# Patient Record
Sex: MALE | Race: WHITE | Employment: OTHER | URBAN - METROPOLITAN AREA
[De-identification: names, ages, dates, MRNs, and addresses within clinical notes are randomized per-mention and may not be internally consistent; named-entity substitution may affect disease eponyms.]

---

## 2017-01-24 RX ORDER — SIMVASTATIN 20 MG/1
TABLET, FILM COATED ORAL
Qty: 90 TAB | Refills: 2 | Status: SHIPPED | OUTPATIENT
Start: 2017-01-24 | End: 2018-05-18 | Stop reason: SDUPTHER

## 2017-06-07 DIAGNOSIS — E78.5 DYSLIPIDEMIA: ICD-10-CM

## 2017-06-07 DIAGNOSIS — Z00.00 ROUTINE GENERAL MEDICAL EXAMINATION AT A HEALTH CARE FACILITY: ICD-10-CM

## 2017-06-07 DIAGNOSIS — Z00.00 NORMAL ROUTINE PHYSICAL EXAMINATION: ICD-10-CM

## 2017-06-07 RX ORDER — TADALAFIL 2.5 MG/1
2.5 TABLET ORAL AS NEEDED
Qty: 12 TAB | Refills: 3 | Status: SHIPPED | OUTPATIENT
Start: 2017-06-07 | End: 2018-04-23 | Stop reason: SDUPTHER

## 2017-06-07 NOTE — TELEPHONE ENCOUNTER
Requested Prescriptions     Pending Prescriptions Disp Refills    tadalafil (CIALIS) 2.5 mg tablet 12 Tab 3     Sig: Take 1 Tab by mouth as needed for Other.

## 2017-10-04 ENCOUNTER — TELEPHONE (OUTPATIENT)
Dept: INTERNAL MEDICINE CLINIC | Age: 72
End: 2017-10-04

## 2017-10-04 NOTE — TELEPHONE ENCOUNTER
#[phone] pt is going to [facility] and needs to know about inoculation. Please give pt a call to discuss.

## 2017-10-05 NOTE — TELEPHONE ENCOUNTER
Unable to reach patient LVM to return call.  Advised that Dr. Bailey Morgan had retired and he needed to establish care with a different PCP

## 2017-10-05 NOTE — TELEPHONE ENCOUNTER
Patient wanted to know if he'd received his Hep A shot because [place]. (i dont see anything on snapshot) If he has not received the shot how can he go about getting it. He stated Dr. Jhony Welch referred him to Dr. Charlotte Marvin.     Call back# [phone], ok to [facility]

## 2017-10-16 ENCOUNTER — TELEPHONE (OUTPATIENT)
Dept: INTERNAL MEDICINE CLINIC | Age: 72
End: 2017-10-16

## 2018-04-23 ENCOUNTER — OFFICE VISIT (OUTPATIENT)
Dept: INTERNAL MEDICINE CLINIC | Age: 73
End: 2018-04-23

## 2018-04-23 ENCOUNTER — HOSPITAL ENCOUNTER (OUTPATIENT)
Dept: LAB | Age: 73
Discharge: HOME | End: 2018-04-23
Payer: MEDICARE

## 2018-04-23 VITALS
HEART RATE: 70 BPM | RESPIRATION RATE: 16 BRPM | WEIGHT: 169 LBS | HEIGHT: 68 IN | SYSTOLIC BLOOD PRESSURE: 128 MMHG | OXYGEN SATURATION: 99 % | TEMPERATURE: 97.5 F | BODY MASS INDEX: 25.61 KG/M2 | DIASTOLIC BLOOD PRESSURE: 72 MMHG

## 2018-04-23 DIAGNOSIS — Z23 ENCOUNTER FOR IMMUNIZATION: ICD-10-CM

## 2018-04-23 DIAGNOSIS — E78.5 DYSLIPIDEMIA: ICD-10-CM

## 2018-04-23 DIAGNOSIS — H40.9 GLAUCOMA, UNSPECIFIED GLAUCOMA TYPE, UNSPECIFIED LATERALITY: ICD-10-CM

## 2018-04-23 DIAGNOSIS — R79.89 LOW VITAMIN D LEVEL: Chronic | ICD-10-CM

## 2018-04-23 DIAGNOSIS — M17.11 PRIMARY OSTEOARTHRITIS OF RIGHT KNEE: ICD-10-CM

## 2018-04-23 DIAGNOSIS — Z00.00 INITIAL MEDICARE ANNUAL WELLNESS VISIT: Primary | ICD-10-CM

## 2018-04-23 DIAGNOSIS — Z00.00 ROUTINE GENERAL MEDICAL EXAMINATION AT A HEALTH CARE FACILITY: ICD-10-CM

## 2018-04-23 DIAGNOSIS — N52.8 OTHER MALE ERECTILE DYSFUNCTION: Chronic | ICD-10-CM

## 2018-04-23 PROCEDURE — 82306 VITAMIN D 25 HYDROXY: CPT

## 2018-04-23 PROCEDURE — 84153 ASSAY OF PSA TOTAL: CPT

## 2018-04-23 PROCEDURE — 80053 COMPREHEN METABOLIC PANEL: CPT

## 2018-04-23 PROCEDURE — 80061 LIPID PANEL: CPT

## 2018-04-23 PROCEDURE — 86803 HEPATITIS C AB TEST: CPT

## 2018-04-23 PROCEDURE — 84443 ASSAY THYROID STIM HORMONE: CPT

## 2018-04-23 PROCEDURE — 85025 COMPLETE CBC W/AUTO DIFF WBC: CPT

## 2018-04-23 PROCEDURE — 36415 COLL VENOUS BLD VENIPUNCTURE: CPT

## 2018-04-23 RX ORDER — TADALAFIL 2.5 MG/1
2.5 TABLET ORAL AS NEEDED
Qty: 12 TAB | Refills: 3 | Status: SHIPPED | OUTPATIENT
Start: 2018-04-23 | End: 2019-09-06 | Stop reason: SDUPTHER

## 2018-04-23 NOTE — MR AVS SNAPSHOT
[address] [facility] 
[phone] Patient: Kael Partida MRN:  [id number] Visit Information Date & Time Provider Department Dept. Phone Encounter #  
 4/23/2018  9:30 AM Jared Fagan, [address] [id number] Follow-up Instructions Return in about 1 year (around 4/23/2019). Upcoming Health Maintenance Date Due Hepatitis C Screening 1945 GLAUCOMA SCREENING Q2Y 5/1/2015 Pneumococcal 65+ Low/Medium Risk (2 of 2 - PPSV23) 12/23/2015 Influenza Age 5 to Adult 8/1/2017 MEDICARE YEARLY EXAM 4/24/2019 DTaP/Tdap/Td series (2 - Td) 12/22/2021 COLONOSCOPY 8/19/2023 Allergies as of 4/23/2018  Review Complete On: 4/23/2018 By: Magalie Melgar III, DO Severity Noted Reaction Type Reactions Pravachol [Pravastatin]  11/23/2009    Rash  
 Sulfa (Sulfonamide Antibiotics)  11/23/2009    Other (comments) \"fever; bleeding\" Current Immunizations  Reviewed on 4/23/2018 Name Date Influenza Vaccine 10/7/2017, 10/4/2016, 9/15/2015, 11/24/2014 Influenza Vaccine Whole 9/1/2012 Pneumococcal Conjugate (PCV-13) 12/23/2014 TD Vaccine 12/7/2009 TDAP Vaccine 12/22/2011 10:15 AM  
 Zoster 12/22/2010 Reviewed by Jared Fagan DO on 4/23/2018 at  9:45 AM  
You Were Diagnosed With   
  
 Codes Comments Initial Medicare annual wellness visit    -  Primary ICD-10-CM: Z00.00 ICD-9-CM: V70.0 Routine general medical examination at a health care facility     ICD-10-CM: Z00.00 ICD-9-CM: V70.0 Dyslipidemia     ICD-10-CM: E78.5 ICD-9-CM: 272.4 Other male erectile dysfunction     ICD-10-CM: N52.8 ICD-9-CM: 607.84 Glaucoma, unspecified glaucoma type, unspecified laterality     ICD-10-CM: H40.9 ICD-9-CM: 365.9  Primary osteoarthritis of right knee     ICD-10-CM: M17.11 
 ICD-9-CM: 715.16 Low vitamin D level     ICD-10-CM: E55.9 ICD-9-CM: 268.9 Vitals BP Pulse Temp Resp Height(growth percentile) Weight(growth percentile) 147/70 (BP 1 Location: Right arm, BP Patient Position: Sitting) 70 97.5 °F (36.4 °C) (Oral) 16 5' 8\" (1.727 m) 169 lb (76.7 kg) SpO2 BMI Smoking Status 99% 25.7 kg/m2 Never Smoker Vitals History BMI and BSA Data Body Mass Index Body Surface Area 25.7 kg/m 2 1.92 m 2 Preferred Pharmacy Pharmacy Name Phone [facility][id number]- [place] [address] [phone] Your Updated Medication List  
  
   
This list is accurate as of 18 10:13 AM.  Always use your most recent med list.  
  
  
  
  
 ALEVE 220 mg tablet Generic drug:  naproxen sodium Take 220 mg by mouth two (2) times daily (with meals). aspirin delayed-release 81 mg tablet Take 81 mg by mouth daily. pneumococcal 23-valent 25 mcg/0.5 mL injection Commonly known as:  PNEUMOVAX 23  
0.5 mL by IntraMUSCular route once for 1 dose. simvastatin 20 mg tablet Commonly known as:  ZOCOR  
TAKE 1 TABLET BY MOUTH NIGHTLY  
  
 tadalafil 2.5 mg tablet Commonly known as:  CIALIS Take 1 Tab by mouth as needed for Other. varicella-zoster recombinant (PF) 50 mcg/0.5 mL Susr injection Commonly known as:  SHINGRIX (PF)  
0.5 mL by IntraMUSCular route once for 1 dose. Repeat 2nd dose in 2-6 months. VITAMIN D3 1,000 unit tablet Generic drug:  cholecalciferol Take  by mouth daily. XALATAN 0.005 % ophthalmic solution Generic drug:  latanoprost  
Administer 1 Drop to both eyes nightly. Dr. Maximilian Finnegan Prescriptions Printed Refills  
 varicella-zoster recombinant, PF, (SHINGRIX, PF,) 50 mcg/0.5 mL susr injection 1 Si.5 mL by IntraMUSCular route once for 1 dose. Repeat 2nd dose in 2-6 months. Class: Print Route: IntraMUSCular Prescriptions Sent to Pharmacy Refills  
 tadalafil (CIALIS) 2.5 mg tablet 3 Sig: Take 1 Tab by mouth as needed for Other. Class: Normal  
 Pharmacy: [address] [place] [address] Ph #: [phone] Route: Oral  
 pneumococcal 23-valent (PNEUMOVAX 23) 25 mcg/0.5 mL injection 0 Si.5 mL by IntraMUSCular route once for 1 dose. Class: Normal  
 Pharmacy: [address] [place] [address] Ph #: [phone] Route: IntraMUSCular We Performed the Following CBC WITH AUTOMATED DIFF [64083 CPT(R)] HEPATITIS C AB [89234 CPT(R)] LIPID PANEL [53567 CPT(R)] METABOLIC PANEL, COMPREHENSIVE [45681 CPT(R)] PSA, DIAGNOSTIC (PROSTATE SPECIFIC AG) D142074 CPT(R)] TSH 3RD GENERATION [08253 CPT(R)] VITAMIN D, 25 HYDROXY [id number] CPT(R)] Follow-up Instructions Return in about 1 year (around 2019). Patient Instructions Medicare Wellness Visit, Male The best way to live healthy is to have a healthy lifestyle by eating a well-balanced diet, exercising regularly, limiting alcohol and stopping smoking. Regular physical exams and screening tests are another way to keep healthy. Preventive exams provided by your health care provider can find health problems before they become diseases or illnesses. Preventive services including immunizations, screening tests, monitoring and exams can help you take care of your own health. All people over age 72 should have a pneumovax  and and a prevnar shot to prevent pneumonia. These are once in a lifetime unless you and your provider decide differently. All people over 65 should have a yearly flu shot and a tetanus vaccine every 10 years. Screening for diabetes mellitus with a blood sugar test should be done every year.  
 
Glaucoma is a disease of the eye due to increased ocular pressure that can lead to blindness and it should be done every year by an eye professional. 
 
Cardiovascular screening tests that check for elevated lipids (fatty part of blood) which can lead to heart disease and strokes should be done every 5 years. Colorectal screening that evaluates for blood or polyps in your colon should be done yearly as a stool test or every five years as a flexible sigmoidoscope or every 10 years as a colonoscopy up to age 76. Men up to age 76 may need a screening blood test for prostate cancer at certain intervals, depending on their personal and family history. This decision is between the patient and his provider. If you have been a smoker or had family history of abdominal aortic aneurysms, you and your provider may decide to schedule an ultrasound test of your aorta. Hepatitis C screening is also recommended for anyone born between [birth year] through [address]. A shingles vaccine is also recommended once in a lifetime after age 61. Your Medicare Wellness Exam is recommended annually. Here is a list of your current Health Maintenance items with a due date: 
Health Maintenance Due Topic Date Due  
 Hepatitis C Test  1945  Glaucoma Screening   05/01/2015  Pneumococcal Vaccine (2 of 2 - PPSV23) 12/23/2015  Flu Vaccine  08/01/2017 [facility] Annual Well Visit  03/14/2018 Introducing [facility]! [employer] introduces [employer] patient portal. Now you can access parts of your medical record, email your doctor's office, and request medication refills online. 1. In your internet browser, go to [email or web address] 2. Click on the First Time User? Click Here link in the Sign In box. You will see the New Member Sign Up page. 3. Enter your [employer] Access Code exactly as it appears below. You will not need to use this code after youve completed the sign-up process. If you do not sign up before the expiration date, you must request a new code. · "[employer] Access Code: [email or web address] Expires: 7/22/2018 10:13 AM 
 
4. Enter the last four digits of your Social Security Number (xxxx) and Date of Birth (mm/dd/yyyy) as indicated and click Submit. You will be taken to the next sign-up page. 5. Create a "[employer] ID. This will be your "[employer] login ID and cannot be changed, so think of one that is secure and easy to remember. 6. Create a "[employer] password. You can change your password at any time. 7. Enter your Password Reset Question and Answer. This can be used at a later time if you forget your password. 8. Enter your e-mail address. You will receive e-mail notification when new information is available in [address] 9. Click Sign Up. You can now view and download portions of your medical record. 10. Click the Download Summary menu link to download a portable copy of your medical information. If you have questions, please visit the Frequently Asked Questions section of the "[employer] website. Remember, "[employer] is NOT to be used for urgent needs. For medical emergencies, dial 911. Now available from your iPhone and Android! Please provide this summary of care documentation to your next provider. Your primary care clinician is listed as Robert Arellano If you have any questions after today's visit, please call [phone].

## 2018-04-23 NOTE — PATIENT INSTRUCTIONS

## 2018-04-23 NOTE — LETTER
4/24/2018 11:40 AM 
 
Mr. Horace Gorman [address] [id number] Dear Horace Gorman: 
 
Please find your most recent results below. Resulted Orders CBC WITH AUTOMATED DIFF Result Value Ref Range WBC 5.6 3.4 - 10.8 x10E3/uL  
 RBC 4.76 4.14 - 5.80 x10E6/uL HGB 14.0 13.0 - 17.7 g/dL HCT 41.7 37.5 - 51.0 % MCV 88 79 - 97 fL  
 MCH 29.4 26.6 - 33.0 pg  
 MCHC 33.6 31.5 - 35.7 g/dL  
 RDW 14.4 12.3 - 15.4 % PLATELET 917 139 - 171 x10E3/uL NEUTROPHILS 78 Not Estab. % Lymphocytes 16 Not Estab. % MONOCYTES 5 Not Estab. % EOSINOPHILS 1 Not Estab. % BASOPHILS 0 Not Estab. %  
 ABS. NEUTROPHILS 4.5 1.4 - 7.0 x10E3/uL Abs Lymphocytes 0.9 0.7 - 3.1 x10E3/uL  
 ABS. MONOCYTES 0.3 0.1 - 0.9 x10E3/uL  
 ABS. EOSINOPHILS 0.0 0.0 - 0.4 x10E3/uL  
 ABS. BASOPHILS 0.0 0.0 - 0.2 x10E3/uL IMMATURE GRANULOCYTES 0 Not Estab. %  
 ABS. IMM. GRANS. 0.0 0.0 - 0.1 x10E3/uL Narrative Performed at:  [address]  [id number] : Artie Arzate MD, Phone:  [phone] HEPATITIS C AB Result Value Ref Range Hep C Virus Ab <0.1 0.0 - 0.9 s/co ratio Comment:  
                                     Negative:     < 0.8 Indeterminate: 0.8 - 0.9 Positive:     > 0.9 The CDC recommends that a positive HCV antibody result 
 be followed up with a HCV Nucleic Acid Amplification 
 test ([id number]). Narrative Performed at:  [address]  [id number] : Artie Arzate MD, Phone:  [phone] METABOLIC PANEL, COMPREHENSIVE Result Value Ref Range Glucose 83 65 - 99 mg/dL BUN 19 8 - 27 mg/dL Creatinine 1.12 0.76 - 1.27 mg/dL GFR est non-AA 66 >59 mL/min/1.73 GFR est AA 77 >59 mL/min/1.73  
 BUN/Creatinine ratio 17 10 - 24  Sodium 145 (H) 134 - 144 mmol/L  
 Potassium 4.6 3.5 - 5.2 mmol/L Chloride 104 96 - 106 mmol/L  
 CO2 25 18 - 29 mmol/L Calcium 9.7 8.6 - 10.2 mg/dL Protein, total 6.9 6.0 - 8.5 g/dL Albumin 4.7 3.5 - 4.8 g/dL GLOBULIN, TOTAL 2.2 1.5 - 4.5 g/dL A-G Ratio 2.1 1.2 - 2.2 Bilirubin, total 0.5 0.0 - 1.2 mg/dL Alk. phosphatase 69 39 - 117 IU/L  
 AST (SGOT) 24 0 - 40 IU/L  
 ALT (SGPT) 14 0 - 44 IU/L Narrative Performed at:  [address]  [id number] : Milka Mathis MD, Phone:  [phone] LIPID PANEL Result Value Ref Range Cholesterol, total 252 (H) 100 - 199 mg/dL Triglyceride 73 0 - 149 mg/dL HDL Cholesterol 84 >39 mg/dL VLDL, calculated 15 5 - 40 mg/dL LDL, calculated 153 (H) 0 - 99 mg/dL Narrative Performed at:  [address]  [id number] : Milka Mathis MD, Phone:  [phone] PSA, DIAGNOSTIC (PROSTATE SPECIFIC AG) Result Value Ref Range Prostate Specific Ag 1.1 0.0 - 4.0 ng/mL Comment:  
   Roche ECLIA methodology. According to the American Urological Association, Serum PSA should 
decrease and remain at undetectable levels after radical 
prostatectomy. The AUA defines biochemical recurrence as an initial 
PSA value 0.2 ng/mL or greater followed by a subsequent confirmatory PSA value 0.2 ng/mL or greater. Values obtained with different assay methods or kits cannot be used 
interchangeably. Results cannot be interpreted as absolute evidence 
of the presence or absence of malignant disease. Narrative Performed at:  [address]  [id number] : Milka Mathis MD, Phone:  [phone] VITAMIN D, 25 HYDROXY Result Value Ref Range VITAMIN D, 25-HYDROXY 33.7 30.0 - 100.0 ng/mL    Comment:  
   Vitamin D deficiency has been defined by the [place] of 
 Medicine and an Endocrine Society practice guideline as a 
level of serum 25-OH vitamin D less than 20 ng/mL (1,2). The Endocrine Society went on to further define vitamin D 
insufficiency as a level between 21 and 29 ng/mL (2). 1. IOM ([place] of Medicine). 2010. Dietary reference 
   intakes for calcium and D. [address] [facility]: The 
   [employer]. 2. Fabiola MF, [place] NC, Sadiq LEA, et al. 
   Evaluation, treatment, and prevention of vitamin D 
   deficiency: an Endocrine Society clinical practice 
   guideline. JCEM. 2011 Jul; 96(7):1911-30. Narrative Performed at:  [address]  [id number] : Herman Cortez MD, Phone:  [phone] TSH 3RD GENERATION Result Value Ref Range TSH 1.200 0.450 - 4.500 uIU/mL Narrative Performed at:  [address]  [id number] : Herman Cortez MD, Phone:  [phone] RECOMMENDATIONS: 
Labs look good except cholesterol levels have shot up.  LDL now 153, goal is less than 100. Make sure you are taking his zocor every day.  If you have been, then we should double the dose to 40 mg daily.  Let me know if you need a new prescription for increased dose. Please call me if you have any questions: [phone] Sincerely, Teresita Ponce III, DO

## 2018-04-23 NOTE — PROGRESS NOTES
Luis Leonard is a 81 y.o. male who presents for evaluation of transfer of care. Used to see dr Ambrosio Reed as his pcp, last saw him dec 2016. Overall doing very well. Still works part time at law office. Stays active playing raHashdoc ball as well. ROS:  Constitutional: negative for fevers, chills, anorexia and weight loss  Eyes:   negative for visual disturbance and irritation  ENT:   negative for tinnitus,sore throat,nasal congestion,ear pain,hoarseness  Respiratory:  negative for cough, hemoptysis, dyspnea,wheezing  CV:   negative for chest pain, palpitations, lower extremity edema  GI:   negative for nausea, vomiting, diarrhea, abdominal pain,melena  Genitourinary: negative for frequency, dysuria and hematuria  Musculoskel: negative for myalgias, arthralgias, back pain, muscle weakness, joint pain  Neurological:  negative for headaches, dizziness, focal weakness, numbness  Psychiatric:     Negative for depression or anxiety      Past Medical History:   Diagnosis Date    Degenerative arthritis of knee 12/7/2009    Dermatitis,nonspecific 12/7/2009    Dyslipidemia 12/7/2009    Glaucoma 12/7/2009       Past Surgical History:   Procedure Laterality Date    HX SKIN BIOPSY  04/15    on forehead, pre-melanoma       Family History   Problem Relation Age of Onset    Cancer Mother     Heart Disease Father        Social History     Social History    Marital status:      Spouse name: N/A    Number of children: N/A    Years of education: N/A     Occupational History    Not on file.      Social History Main Topics    Smoking status: Never Smoker    Smokeless tobacco: Never Used    Alcohol use Yes      Comment: not to excess    Drug use: No    Sexual activity: Yes     Partners: Female     Other Topics Concern    Not on file     Social History Narrative            Visit Vitals    /70 (BP 1 Location: Right arm, BP Patient Position: Sitting)    Pulse 70    Temp 97.5 °F (36.4 °C) (Oral)  Resp 16    Ht 5' 8\" (1.727 m)    Wt 169 lb (76.7 kg)    SpO2 99%    BMI 25.7 kg/m2       Physical Examination:   General - Well appearing male  HEENT - PERRL, TM no erythema/opacification, normal nasal turbinates, no oropharyngeal erythema or exudate, MMM  Neck - supple, no bruits, no thyroidomegaly, no lymphadenopathy  Pulm - clear to auscultation bilaterally  Cardio - RRR, normal S1 S2, no murmur  Abd - soft, nontender, no masses, no HSM  Extrem - no edema, +2 distal pulses  Neuro-  No focal deficits, CN intact     Assessment/Plan:    1. Routine adult health maintenance---pneumovax 23 given today. rx given for shingrix. Check cbc, cmp, flp, tsh, hcv, psa  2. Low vit d--check levels, on replacement  3. ED--prn cialis  4.  hyperlipids--on zocor, check flp, cmp  5. Glaucoma--follows with dr Jonny Navas, on xalatan  6. DJD--right knee, right shoulder. Prn aleve  7. Possible psoriatic arthritis  8. Right forehead precancerous growth--follows with derm  9. Bilateral excessive ear wax--nurses to flush both today. rtc one year, sooner if labs abn. Ceasar Velasquez III, DO                This is an Initial Medicare Annual Wellness Exam (AWV) (Performed 12 months after IPPE or effective date of Medicare Part B enrollment, Once in a lifetime)    I have reviewed the patient's medical history in detail and updated the computerized patient record. History     Past Medical History:   Diagnosis Date    Degenerative arthritis of knee 12/7/2009    Dermatitis,nonspecific 12/7/2009    Dyslipidemia 12/7/2009    Glaucoma 12/7/2009      Past Surgical History:   Procedure Laterality Date    HX SKIN BIOPSY  04/15    on forehead, pre-melanoma     Current Outpatient Prescriptions   Medication Sig Dispense Refill    tadalafil (CIALIS) 2.5 mg tablet Take 1 Tab by mouth as needed for Other.  12 Tab 3    simvastatin (ZOCOR) 20 mg tablet TAKE 1 TABLET BY MOUTH NIGHTLY 90 Tab 2    cholecalciferol (VITAMIN D3) 1,000 unit tablet Take  by mouth daily.  aspirin delayed-release 81 mg tablet Take 81 mg by mouth daily.  latanoprost (XALATAN) 0.005 % ophthalmic solution Administer 1 Drop to both eyes nightly. Dr. Emigdio Linn       naproxen sodium (ALEVE) 220 mg tablet Take 220 mg by mouth two (2) times daily (with meals). Allergies   Allergen Reactions    Pravachol [Pravastatin] Rash    Sulfa (Sulfonamide Antibiotics) Other (comments)     \"fever; bleeding\"     Family History   Problem Relation Age of Onset    Cancer Mother     Heart Disease Father      Social History   Substance Use Topics    Smoking status: Never Smoker    Smokeless tobacco: Never Used    Alcohol use Yes      Comment: not to excess     Patient Active Problem List   Diagnosis Code    Dyslipidemia E78.5    Degenerative arthritis of knee M17.10    Glaucoma H40.9    Dermatitis,nonspecific L30.9    Conjunctivitis of left eye H10.9       Depression Risk Factor Screening:     PHQ over the last two weeks 4/23/2018   Little interest or pleasure in doing things Not at all   Feeling down, depressed or hopeless Not at all   Total Score PHQ 2 0     Alcohol Risk Factor Screening: You do not drink alcohol or very rarely. Few drinks couple times per week. Functional Ability and Level of Safety:     Hearing Loss  Hearing is good. Activities of Daily Living  The home contains: no safety equipment. Patient does total self care    Fall Risk  Fall Risk Assessment, last 12 mths 4/23/2018   Able to walk? Yes   Fall in past 12 months? No   Fall with injury? -   Number of falls in past 12 months -   Fall Risk Score -       Abuse Screen  Patient is not abused. Lives at home with wife of 39 years.     Cognitive Screening   Evaluation of Cognitive Function:  Has your family/caregiver stated any concerns about your memory: no  Normal    Patient Care Team   Patient Care Team:  Magalie Melgar III, DO as PCP - General (Internal Medicine)    Assessment/Plan   Education and counseling provided:  Are appropriate based on today's review and evaluation  End-of-Life planning (with patient's consent)  Pneumococcal Vaccine  Prostate cancer screening tests (PSA, covered annually)  Screening for glaucoma    Diagnoses and all orders for this visit:    1. Initial Medicare annual wellness visit    2. Routine general medical examination at a health care facility  -     tadalafil (CIALIS) 2.5 mg tablet; Take 1 Tab by mouth as needed for Other. 3. Dyslipidemia  -     tadalafil (CIALIS) 2.5 mg tablet; Take 1 Tab by mouth as needed for Other. 4. Normal routine physical examination  -     tadalafil (CIALIS) 2.5 mg tablet; Take 1 Tab by mouth as needed for Other.          Health Maintenance Due   Topic Date Due    Hepatitis C Screening  1945    GLAUCOMA SCREENING Q2Y  05/01/2015    Pneumococcal 65+ Low/Medium Risk (2 of 2 - PPSV23) 12/23/2015    Influenza Age 9 to Adult  08/01/2017    MEDICARE YEARLY EXAM  03/14/2018

## 2018-04-23 NOTE — PROGRESS NOTES
Reviewed record in preparation for visit and have obtained necessary documentation. Identified pt with two pt identifiers(name and ). Chief Complaint   Patient presents with   [facility] Maintenance Due   Topic Date Due    Hepatitis C Screening  1945    GLAUCOMA SCREENING Q2Y  2015    Pneumococcal 65+ Low/Medium Risk (2 of 2 - PPSV23) 2015    Influenza Age 5 to Adult  2017    MEDICARE YEARLY EXAM  2018       Mr. Johan Campos has a reminder for a \"due or due soon\" health maintenance. I have asked that he discuss health maintenance topic(s) due with His  primary care provider. Coordination of Care Questionnaire:  :     1) Have you been to an emergency room, urgent care clinic since your last visit? no   Hospitalized since your last visit? no             2) Have you seen or consulted any other health care providers outside of [address] [place] [address] since your last visit? no  (Include any pap smears or colon screenings in this section.)    3) Do you have an Advance Directive on file? no    4) Are you interested in receiving information on Advance Directives? NO    Patient is accompanied by self I have received verbal consent from Boubacar Bai to discuss any/all medical information while they are present in the room.

## 2018-04-24 LAB
25(OH)D3+25(OH)D2 SERPL-MCNC: 33.7 NG/ML (ref 30–100)
ALBUMIN SERPL-MCNC: 4.7 G/DL (ref 3.5–4.8)
ALBUMIN/GLOB SERPL: 2.1 {RATIO} (ref 1.2–2.2)
ALP SERPL-CCNC: 69 IU/L (ref 39–117)
ALT SERPL-CCNC: 14 IU/L (ref 0–44)
AST SERPL-CCNC: 24 IU/L (ref 0–40)
BASOPHILS # BLD AUTO: 0 X10E3/UL (ref 0–0.2)
BASOPHILS NFR BLD AUTO: 0 %
BILIRUB SERPL-MCNC: 0.5 MG/DL (ref 0–1.2)
BUN SERPL-MCNC: 19 MG/DL (ref 8–27)
BUN/CREAT SERPL: 17 (ref 10–24)
CALCIUM SERPL-MCNC: 9.7 MG/DL (ref 8.6–10.2)
CHLORIDE SERPL-SCNC: 104 MMOL/L (ref 96–106)
CHOLEST SERPL-MCNC: 252 MG/DL (ref 100–199)
CO2 SERPL-SCNC: 25 MMOL/L (ref 18–29)
CREAT SERPL-MCNC: 1.12 MG/DL (ref 0.76–1.27)
EOSINOPHIL # BLD AUTO: 0 X10E3/UL (ref 0–0.4)
EOSINOPHIL NFR BLD AUTO: 1 %
ERYTHROCYTE [DISTWIDTH] IN BLOOD BY AUTOMATED COUNT: 14.4 % (ref 12.3–15.4)
GFR SERPLBLD CREATININE-BSD FMLA CKD-EPI: 66 ML/MIN/1.73
GFR SERPLBLD CREATININE-BSD FMLA CKD-EPI: 77 ML/MIN/1.73
GLOBULIN SER CALC-MCNC: 2.2 G/DL (ref 1.5–4.5)
GLUCOSE SERPL-MCNC: 83 MG/DL (ref 65–99)
HCT VFR BLD AUTO: 41.7 % (ref 37.5–51)
HCV AB S/CO SERPL IA: <0.1 S/CO RATIO (ref 0–0.9)
HDLC SERPL-MCNC: 84 MG/DL
HGB BLD-MCNC: 14 G/DL (ref 13–17.7)
IMM GRANULOCYTES # BLD: 0 X10E3/UL (ref 0–0.1)
IMM GRANULOCYTES NFR BLD: 0 %
LDLC SERPL CALC-MCNC: 153 MG/DL (ref 0–99)
LYMPHOCYTES # BLD AUTO: 0.9 X10E3/UL (ref 0.7–3.1)
LYMPHOCYTES NFR BLD AUTO: 16 %
MCH RBC QN AUTO: 29.4 PG (ref 26.6–33)
MCHC RBC AUTO-ENTMCNC: 33.6 G/DL (ref 31.5–35.7)
MCV RBC AUTO: 88 FL (ref 79–97)
MONOCYTES # BLD AUTO: 0.3 X10E3/UL (ref 0.1–0.9)
MONOCYTES NFR BLD AUTO: 5 %
NEUTROPHILS # BLD AUTO: 4.5 X10E3/UL (ref 1.4–7)
NEUTROPHILS NFR BLD AUTO: 78 %
PLATELET # BLD AUTO: 225 X10E3/UL (ref 150–379)
POTASSIUM SERPL-SCNC: 4.6 MMOL/L (ref 3.5–5.2)
PROT SERPL-MCNC: 6.9 G/DL (ref 6–8.5)
PSA SERPL-MCNC: 1.1 NG/ML (ref 0–4)
RBC # BLD AUTO: 4.76 X10E6/UL (ref 4.14–5.8)
SODIUM SERPL-SCNC: 145 MMOL/L (ref 134–144)
TRIGL SERPL-MCNC: 73 MG/DL (ref 0–149)
TSH SERPL DL<=0.005 MIU/L-ACNC: 1.2 UIU/ML (ref 0.45–4.5)
VLDLC SERPL CALC-MCNC: 15 MG/DL (ref 5–40)
WBC # BLD AUTO: 5.6 X10E3/UL (ref 3.4–10.8)

## 2018-04-24 NOTE — PROGRESS NOTES
I have attempted to contact this patient by phone with the following results:   Labs look good except cholesterol levels have shot up. LDL now 153, goal is less than 100. Make sure he is taking his zocor every day. If he has been, then we should double the dose to 40 mg daily. Let me know if he needs new rx for increased dose. Detailed message left on voicemail regarding above. Results mailed to patient's home.

## 2018-04-24 NOTE — PROGRESS NOTES
Labs look good except cholesterol levels have shot up. LDL now 153, goal is less than 100. Make sure he is taking his zocor every day. If he has been, then we should double the dose to 40 mg daily. Let me know if he needs new rx for increased dose.

## 2018-07-30 ENCOUNTER — APPOINTMENT (OUTPATIENT)
Dept: CT IMAGING | Age: 73
DRG: 066 | End: 2018-07-30
Attending: EMERGENCY MEDICINE
Payer: MEDICARE

## 2018-07-30 ENCOUNTER — HOSPITAL ENCOUNTER (INPATIENT)
Age: 73
LOS: 2 days | Discharge: HOME | DRG: 066 | End: 2018-08-01
Attending: EMERGENCY MEDICINE | Admitting: FAMILY MEDICINE
Payer: MEDICARE

## 2018-07-30 DIAGNOSIS — R27.0 ATAXIA: ICD-10-CM

## 2018-07-30 DIAGNOSIS — I63.9 CEREBROVASCULAR ACCIDENT (CVA), UNSPECIFIED MECHANISM (HCC): ICD-10-CM

## 2018-07-30 DIAGNOSIS — R47.1 DYSARTHRIA: ICD-10-CM
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
## 2018-07-30 PROBLEM — R41.82 ALTERED MENTAL STATUS: Status: ACTIVE | Noted: 2018-07-30

## 2018-07-30 LAB
ALBUMIN SERPL-MCNC: 3.7 G/DL (ref 3.5–5)
ALBUMIN/GLOB SERPL: 1 {RATIO} (ref 1.1–2.2)
ALP SERPL-CCNC: 63 U/L (ref 45–117)
ALT SERPL-CCNC: 28 U/L (ref 12–78)
ANION GAP SERPL CALC-SCNC: 6 MMOL/L (ref 5–15)
AST SERPL-CCNC: 21 U/L (ref 15–37)
BASOPHILS # BLD: 0 K/UL (ref 0–0.1)
BASOPHILS NFR BLD: 0 % (ref 0–1)
BILIRUB SERPL-MCNC: 0.5 MG/DL (ref 0.2–1)
BUN SERPL-MCNC: 21 MG/DL (ref 6–20)
BUN/CREAT SERPL: 19 (ref 12–20)
CALCIUM SERPL-MCNC: 9.2 MG/DL (ref 8.5–10.1)
CHLORIDE SERPL-SCNC: 106 MMOL/L (ref 97–108)
CO2 SERPL-SCNC: 30 MMOL/L (ref 21–32)
CREAT SERPL-MCNC: 1.09 MG/DL (ref 0.7–1.3)
DIFFERENTIAL METHOD BLD: NORMAL
EOSINOPHIL # BLD: 0.1 K/UL (ref 0–0.4)
EOSINOPHIL NFR BLD: 1 % (ref 0–7)
ERYTHROCYTE [DISTWIDTH] IN BLOOD BY AUTOMATED COUNT: 13.8 % (ref 11.5–14.5)
GLOBULIN SER CALC-MCNC: 3.8 G/DL (ref 2–4)
GLUCOSE SERPL-MCNC: 91 MG/DL (ref 65–100)
HCT VFR BLD AUTO: 43.1 % (ref 36.6–50.3)
HGB BLD-MCNC: 13.9 G/DL (ref 12.1–17)
IMM GRANULOCYTES # BLD: 0 K/UL (ref 0–0.04)
IMM GRANULOCYTES NFR BLD AUTO: 0 % (ref 0–0.5)
LYMPHOCYTES # BLD: 1.3 K/UL (ref 0.8–3.5)
LYMPHOCYTES NFR BLD: 24 % (ref 12–49)
MCH RBC QN AUTO: 30.2 PG (ref 26–34)
MCHC RBC AUTO-ENTMCNC: 32.3 G/DL (ref 30–36.5)
MCV RBC AUTO: 93.7 FL (ref 80–99)
MONOCYTES # BLD: 0.4 K/UL (ref 0–1)
MONOCYTES NFR BLD: 7 % (ref 5–13)
NEUTS SEG # BLD: 3.9 K/UL (ref 1.8–8)
NEUTS SEG NFR BLD: 68 % (ref 32–75)
NRBC # BLD: 0 K/UL (ref 0–0.01)
NRBC BLD-RTO: 0 PER 100 WBC
PLATELET # BLD AUTO: 209 K/UL (ref 150–400)
PMV BLD AUTO: 10.5 FL (ref 8.9–12.9)
POTASSIUM SERPL-SCNC: 3.6 MMOL/L (ref 3.5–5.1)
PROT SERPL-MCNC: 7.5 G/DL (ref 6.4–8.2)
RBC # BLD AUTO: 4.6 M/UL (ref 4.1–5.7)
SODIUM SERPL-SCNC: 142 MMOL/L (ref 136–145)
WBC # BLD AUTO: 5.7 K/UL (ref 4.1–11.1)

## 2018-07-30 PROCEDURE — 70450 CT HEAD/BRAIN W/O DYE: CPT

## 2018-07-30 PROCEDURE — 93005 ELECTROCARDIOGRAM TRACING: CPT

## 2018-07-30 PROCEDURE — 85025 COMPLETE CBC W/AUTO DIFF WBC: CPT | Performed by: EMERGENCY MEDICINE

## 2018-07-30 PROCEDURE — 80053 COMPREHEN METABOLIC PANEL: CPT | Performed by: EMERGENCY MEDICINE

## 2018-07-30 PROCEDURE — 65660000000 HC RM CCU STEPDOWN

## 2018-07-30 PROCEDURE — 99285 EMERGENCY DEPT VISIT HI MDM: CPT

## 2018-07-30 PROCEDURE — 36415 COLL VENOUS BLD VENIPUNCTURE: CPT | Performed by: EMERGENCY MEDICINE

## 2018-07-30 NOTE — IP AVS SNAPSHOT
1111 [facility] [address] [place] [address] 
[phone] Patient: Gino Lazcano MRN: [id number] NWR:46/28/2049 About your hospitalization You were admitted on:  July 30, 2018 You last received care in the:  [facility] 6S NEURO-SCI TELE You were discharged on:  August 1, 2018 Why you were hospitalized Your primary diagnosis was:  Ataxia Your diagnoses also included:  Dysarthria, Fall, Stroke (Hcc) Follow-up Information Follow up With Details Comments Contact Info Juan David Rogers III, DO Schedule an appointment as soon as possible for a visit  [address] [facility] [address] [facility] 83. 
[phone] Jackquelyn Meckel, DO Schedule an appointment as soon as possible for a visit in 4 weeks  200 [facility] 56 [facility] Neurology [facility] at [address] 
[phone] Discharge Orders None A check esperanza indicates which time of day the medication should be taken. My Medications START taking these medications Instructions Each Dose to Equal  
 Morning Noon Evening Bedtime  
 clopidogrel 75 mg Tab Commonly known as:  PLAVIX Start taking on:  8/2/2018 Your last dose was: Your next dose is: Take 1 Tab by mouth daily. 75 mg CONTINUE taking these medications Instructions Each Dose to Equal  
 Morning Noon Evening Bedtime  
 aspirin delayed-release 81 mg tablet Your last dose was: Your next dose is: Take 81 mg by mouth daily. 81 mg  
    
   
   
   
  
 simvastatin 40 mg tablet Commonly known as:  ZOCOR Your last dose was: Your next dose is: TAKE 1 TABLET BY MOUTH NIGHTLY  
     
   
   
   
  
 tadalafil 2.5 mg tablet Commonly known as:  CIALIS Your last dose was: Your next dose is: Take 1 Tab by mouth as needed for Other. 2.5 mg  
    
   
   
   
  
 VITAMIN D3 1,000 unit tablet Generic drug:  cholecalciferol Your last dose was: Your next dose is: Take  by mouth daily. XALATAN 0.005 % ophthalmic solution Generic drug:  latanoprost  
   
Your last dose was: Your next dose is:    
   
   
 Administer 1 Drop to both eyes nightly. Dr. Greyson Gonzalez 1 Drop STOP taking these medications ALEVE 220 mg tablet Generic drug:  naproxen sodium Where to Get Your Medications Information on where to get these meds will be given to you by the nurse or doctor. ! Ask your nurse or doctor about these medications  
  clopidogrel 75 mg Tab Discharge Instructions Discharge Instructions PATIENT ID: Donta Olson MRN: [id number] YOB: 1945 DATE OF ADMISSION: 7/30/2018  9:24 PM   
DATE OF DISCHARGE: 8/1/2018 PRIMARY CARE PROVIDER: Divine Reno DO  
 
ATTENDING PHYSICIAN: Surinder Ayers MD 
DISCHARGING PROVIDER: Surinder Ayers MD   
To contact this individual call [phone] and ask the  to page. If unavailable ask to be transferred the Adult Hospitalist Department. DISCHARGE DIAGNOSES stroke CONSULTATIONS: IP CONSULT TO HOSPITALIST 
IP CONSULT TO TELE-NEUROLOGY 
IP CONSULT TO NEUROLOGY PROCEDURES/SURGERIES: * No surgery found * PENDING TEST RESULTS:  
At the time of discharge the following test results are still pending: None FOLLOW UP APPOINTMENTS:  
Follow-up Information Follow up With Details Comments Contact Info Citlaly Islas III, DO Schedule an appointment as soon as possible for a visit  Vicky Wilcox 150 [facility] [address] [facility] 83. 
[phone] Maria E Snyder DO Schedule an appointment as soon as possible for a visit in 4 weeks  200 [facility] [address] 56 [facility] Neurology Clinic at [facility] [place] 
[phone] ADDITIONAL CARE RECOMMENDATIONS:  
 
You came in with some slurred speech and were found to have a stroke. You have improved and are almost back to your baseline. We have started you on Plavix and you should continue to take this medication. Please follow-up with Neurology in 4 weeks. DIET: Cardiac Diet ACTIVITY: Activity as tolerated DISCHARGE MEDICATIONS: 
 See Medication Reconciliation Form · It is important that you take the medication exactly as they are prescribed. · Keep your medication in the bottles provided by the pharmacist and keep a list of the medication names, dosages, and times to be taken in your wallet. · Do not take other medications without consulting your doctor. NOTIFY YOUR PHYSICIAN FOR ANY OF THE FOLLOWING:  
Fever over 101 degrees for 24 hours. Chest pain, shortness of breath, fever, chills, nausea, vomiting, diarrhea, change in mentation, falling, weakness, bleeding. Severe pain or pain not relieved by medications. Or, any other signs or symptoms that you may have questions about. DISPOSITION: 
  Home With: 
 OT  PT  [place]  RN  
  
 SNF/Inpatient Rehab/LTAC Independent/assisted living Hospice Other:  
 
Signed:  
Odessa Cárdenas MD 
8/1/2018 2:45 PM 
Stroke: Care Instructions Your Care Instructions You have had a stroke. This means that the blood flow to a part of your brain was blocked for some time, which damages the nerve cells in that part of the brain. The part of your body controlled by that part of your brain may not function properly now. The brain is an amazing organ that can heal itself to some degree. The stroke you had damaged part of your brain. But other parts of your brain may take over in some way for the damaged areas. You have already started this process.  
Your doctor will talk with you about what you can do to prevent another stroke. High blood pressure, high cholesterol, and diabetes are all risk factors for stroke. If you have any of these conditions, work with your doctor to make sure they are under control. Other risk factors for stroke include being overweight, smoking, and not getting regular exercise. Going home may be hard for you and your family. The more you can try to do for yourself, the better. Remember to take each day one at a time. Follow-up care is a key part of your treatment and safety. Be sure to make and go to all appointments, and call your doctor if you are having problems. It's also a good idea to know your test results and keep a list of the medicines you take. How can you care for yourself at home? 
  · Enter a stroke rehabilitation (rehab) program, if your doctor recommends it. Physical, speech, and occupational therapies can help you manage bathing, dressing, eating, and other basics of daily living.  
  · Do not drive until your doctor says it is okay.  
  · It is normal to feel sad or depressed after a stroke. If these feelings last, talk to your doctor.  
  · If you are having problems with urine leakage, go to the bathroom at regular times, including when you first wake up and at bedtime. Also, limit fluids after dinner.  
  · If you are constipated, drink plenty of fluids, enough so that your urine is light yellow or clear like water. If you have kidney, heart, or liver disease and have to limit fluids, talk with your doctor before you increase the amount of fluids you drink. Set up a regular time for using the toilet. If you continue to have constipation, your doctor may suggest using a bulking agent, such as Metamucil, or a stool softener, laxative, or enema. Medicines 
  · Take your medicines exactly as prescribed. Call your doctor if you think you are having a problem with your medicine. You may be taking several medicines.  ACE (angiotensin-converting enzyme) inhibitors, angiotensin II receptor blockers (ARBs), beta-blockers, diuretics (water pills), and calcium channel blockers control your blood pressure. Statins help lower cholesterol. Your doctor may also prescribe medicines for depression, pain, sleep problems, anxiety, or agitation.  
  · If your doctor has given you a blood thinner to prevent another stroke, be sure you get instructions about how to take your medicine safely. Blood thinners can cause serious bleeding problems.  
  · Do not take any over-the-counter medicines or herbal products without talking to your doctor first.  
  · If you take birth control pills or hormone therapy, talk to your doctor about whether they are right for you.  
 For family members and caregivers 
  · Make the home safe. Set up a room so that your loved one does not have to climb stairs. Be sure the bathroom is on the same floor. Move throw rugs and furniture that could cause falls. Make sure that the lighting is good. Put grab bars and seats in tubs and showers.  
  · Find out what your loved one can do and what he or she needs help with. Try not to do things for your loved one that your loved one can do on his or her own. Help him or her learn and practice new skills.  
  · Visit and talk with your loved one often. Try doing activities together that you both enjoy, such as playing cards or board games. Keep in touch with your loved one's friends as much as you can. Encourage them to visit.  
  · Take care of yourself. Do not try to do everything yourself. Ask other family members to help. Eat well, get enough rest, and take time to do things that you enjoy. Keep up with your own doctor visits, and make sure to take your medicines regularly. Get out of the house as much as you can. Join a local support group. Find out if you qualify for home health care visits to help with rehab or for adult day care. When should you call for help? Call 911 anytime you think you may need emergency care. For example, call if: 
  · You have signs of another stroke. These may include: 
¨ Sudden numbness, tingling, weakness, or loss of movement in your face, arm, or leg, especially on only one side of your body. ¨ Sudden vision changes. ¨ Sudden trouble speaking. ¨ Sudden confusion or trouble understanding simple statements. ¨ Sudden problems with walking or balance. ¨ A sudden, severe headache that is different from past headaches. Call 911 even if these symptoms go away in a few minutes.  
 Call your doctor now or seek immediate medical care if: 
  · You have new symptoms that may be related to your stroke, such as falls or trouble swallowing.  
 Watch closely for changes in your health, and be sure to contact your doctor if you have any problems. Where can you learn more? Go to http://marie-jose.info/. Enter T433 in the search box to learn more about \"Stroke: Care Instructions. \" Current as of: November 21, 2017 Content Version: 11.7 © 7744-7950 [employer]. Care instructions adapted under license by [employer] (which disclaims liability or warranty for this information). If you have questions about a medical condition or this instruction, always ask your healthcare professional. [place] [address] any warranty or liability for your use of this information. ACO Transitions of Care Introducing [employer] 508 Denita Sierra offers a voluntary care coordination program to provide high quality service and care to [facility] fee-for-service beneficiaries. Jhony Ronald was designed to help you enhance your health and well-being through the following services: ? Transitions of Care  support for individuals who are transitioning from one care setting to another (example: Hospital to home). ? Chronic and Complex Care Coordination  support for individuals and caregivers of those with serious or chronic illnesses or with more than one chronic (ongoing) condition and those who take a number of different medications. If you meet specific medical criteria, a [facility] may call you directly to coordinate your care with your primary care physician and your other care providers. For questions about the [facility] programs, please, contact your physicians office. For general questions or additional information about Accountable Care Organizations: 
Please visit www.medicare.gov/acos. html or call 1-800-MEDICARE (6-629.374.7693) TTY users should call 3-425.218.2308. [employer] Announcement We are excited to announce that we are making your provider's discharge notes available to you in [employer]. You will see these notes when they are completed and signed by the physician that discharged you from your recent hospital stay. If you have any questions or concerns about any information you see in [employer], please call the Health Information Department where you were seen or reach out to your Primary Care Provider for more information about your plan of care. Introducing \[facility]\"" & HEALTH SERVICES! Mary Beth Alexis introduces [employer] patient portal. Now you can access parts of your medical record, email your doctor's office, and request medication refills online. 1. In your internet browser, go to [email or web address] 2. Click on the First Time User? Click Here link in the Sign In box. You will see the New Member Sign Up page. 3. Enter your [employer] Access Code exactly as it appears below. You will not need to use this code after youve completed the sign-up process. If you do not sign up before the expiration date, you must request a new code. · [employer] Access Code: [email or web address] Expires: 10/28/2018  8:27 PM 
 
 4. Enter the last four digits of your Social Security Number (xxxx) and Date of Birth (mm/dd/yyyy) as indicated and click Submit. You will be taken to the next sign-up page. 5. Create a i-Nalysis ID. This will be your i-Nalysis login ID and cannot be changed, so think of one that is secure and easy to remember. 6. Create a i-Nalysis password. You can change your password at any time. 7. Enter your Password Reset Question and Answer. This can be used at a later time if you forget your password. 8. Enter your e-mail address. You will receive e-mail notification when new information is available in [address] 9. Click Sign Up. You can now view and download portions of your medical record. 10. Click the Download Summary menu link to download a portable copy of your medical information. If you have questions, please visit the Frequently Asked Questions section of the i-Nalysis website. Remember, [employer]-Nalysis is NOT to be used for urgent needs. For medical emergencies, dial 911. Now available from your iPhone and Android! Introducing Theron Dempsey As a [facility] patient, I wanted to make you aware of our electronic visit tool called Theron [employer]. We[employer] 24/7 allows you to connect within minutes with a medical provider 24 hours a day, seven days a week via a mobile device or tablet or logging into a secure website from your computer. You can access Theron "[employer] from anywhere in the [place]. A virtual visit might be right for you when you have a simple condition and feel like you just dont want to get out of bed, or cant get away from work for an appointment, when your regular [facility] provider is not available (evenings, weekends or holidays), or when youre out of town and need minor care.   Electronic visits cost only $49 and if the Theron Cabellosusan provider determines a prescription is needed to treat your condition, one can be electronically transmitted to a nearby pharmacy*. Please take a moment to enroll today if you have not already done so. The enrollment process is free and takes just a few minutes. To enroll, please download the [employer] 24/7 eric to your tablet or phone, or visit [email or web address] to enroll on your computer. And, as an [address] [place] [address] patient with a [employer] account, the results of your visits will be scanned into your electronic medical record and your primary care provider will be able to view the scanned results. We urge you to continue to see your regular [employer] provider for your ongoing medical care. And while your primary care provider may not be the one available when you seek a [employer] virtual visit, the peace of mind you get from getting a real diagnosis real time can be priceless. For more information on [employer], view our Frequently Asked Questions (FAQs) at [email or web address]. Sincerely, 
 
Ziyad Uriostegui MD 
Chief Medical Officer [address] [place] [address] *:  certain medications cannot be prescribed via [employer] Providers Seen During Your Hospitalization Provider Specialty Primary office phone Maico Lee MD Emergency Medicine [phone] Kannan Braga MD Family Practice [phone] Shelley Stone MD Hospitalist [phone] Your Primary Care Physician (PCP) Primary Care Physician Office Phone Office Fax Gita GERONIMO [phone] You are allergic to the following Allergen Reactions Pravachol (Pravastatin) Rash  
    
 Sulfa (Sulfonamide Antibiotics) Other (comments) \"fever; bleeding\" Recent Documentation Height Weight BMI Smoking Status 1.753 m 77.1 kg 25.1 kg/m2 Never Smoker Emergency Contacts Name Discharge Info Relation Home Work Mobile [address] [facility] CAREGIVER [3] Spouse [3] 21 [phone] Patient Belongings The following personal items are in your possession at time of discharge: 
  Dental Appliances: None  Visual Aid: Glasses, With patient      Home Medications: None   Jewelry: Watch, With patient  Clothing: Belt, Pants, Socks, Footwear    Other Valuables: Cell Phone  Personal Items Sent to Safe: none Please provide this summary of care documentation to your next provider. Signatures-by signing, you are acknowledging that this After Visit Summary has been reviewed with you and you have received a copy. Patient Signature:  ____________________________________________________________ Date:  ____________________________________________________________  
  
[facility] Provider Signature:  ____________________________________________________________ Date:  ____________________________________________________________

## 2018-07-30 NOTE — IP AVS SNAPSHOT
[place] [address] [facility] 13 
[phone] Patient: Simon Radford MRN: [id number] QGR:16/27/1324 A check esperanza indicates which time of day the medication should be taken. My Medications START taking these medications Instructions Each Dose to Equal  
 Morning Noon Evening Bedtime  
 clopidogrel 75 mg Tab Commonly known as:  PLAVIX Start taking on:  8/2/2018 Your last dose was: Your next dose is: Take 1 Tab by mouth daily. 75 mg CONTINUE taking these medications Instructions Each Dose to Equal  
 Morning Noon Evening Bedtime  
 aspirin delayed-release 81 mg tablet Your last dose was: Your next dose is: Take 81 mg by mouth daily. 81 mg  
    
   
   
   
  
 simvastatin 40 mg tablet Commonly known as:  ZOCOR Your last dose was: Your next dose is: TAKE 1 TABLET BY MOUTH NIGHTLY  
     
   
   
   
  
 tadalafil 2.5 mg tablet Commonly known as:  CIALIS Your last dose was: Your next dose is: Take 1 Tab by mouth as needed for Other. 2.5 mg  
    
   
   
   
  
 VITAMIN D3 1,000 unit tablet Generic drug:  cholecalciferol Your last dose was: Your next dose is: Take  by mouth daily. XALATAN 0.005 % ophthalmic solution Generic drug:  latanoprost  
   
Your last dose was: Your next dose is:    
   
   
 Administer 1 Drop to both eyes nightly. Dr. Mikie Floyd 1 Drop STOP taking these medications ALEVE 220 mg tablet Generic drug:  naproxen sodium Where to Get Your Medications Information on where to get these meds will be given to you by the nurse or doctor. ! Ask your nurse or doctor about these medications  
  clopidogrel 75 mg Tab

## 2018-07-31 ENCOUNTER — APPOINTMENT (OUTPATIENT)
Dept: MRI IMAGING | Age: 73
DRG: 066 | End: 2018-07-31
Attending: PSYCHIATRY & NEUROLOGY
Payer: MEDICARE

## 2018-07-31 ENCOUNTER — APPOINTMENT (OUTPATIENT)
Dept: MRI IMAGING | Age: 73
DRG: 066 | End: 2018-07-31
Attending: FAMILY MEDICINE
Payer: MEDICARE

## 2018-07-31 PROBLEM — R47.1 DYSARTHRIA: Status: ACTIVE | Noted: 2018-07-30

## 2018-07-31 LAB
ATRIAL RATE: 58 BPM
CALCULATED R AXIS, ECG10: -57 DEGREES
CALCULATED T AXIS, ECG11: -5 DEGREES
CHOLEST SERPL-MCNC: 160 MG/DL
DIAGNOSIS, 93000: NORMAL
EST. AVERAGE GLUCOSE BLD GHB EST-MCNC: 114 MG/DL
HBA1C MFR BLD: 5.6 % (ref 4.2–6.3)
HDLC SERPL-MCNC: 74 MG/DL
HDLC SERPL: 2.2 {RATIO} (ref 0–5)
LDLC SERPL CALC-MCNC: 72.2 MG/DL (ref 0–100)
LIPID PROFILE,FLP: NORMAL
P-R INTERVAL, ECG05: 152 MS
Q-T INTERVAL, ECG07: 426 MS
QRS DURATION, ECG06: 112 MS
QTC CALCULATION (BEZET), ECG08: 418 MS
TRIGL SERPL-MCNC: 69 MG/DL (ref ?–150)
VENTRICULAR RATE, ECG03: 58 BPM
VLDLC SERPL CALC-MCNC: 13.8 MG/DL

## 2018-07-31 PROCEDURE — 74011250636 HC RX REV CODE- 250/636: Performed by: FAMILY MEDICINE

## 2018-07-31 PROCEDURE — 74011250636 HC RX REV CODE- 250/636: Performed by: INTERNAL MEDICINE

## 2018-07-31 PROCEDURE — 70544 MR ANGIOGRAPHY HEAD W/O DYE: CPT

## 2018-07-31 PROCEDURE — G8979 MOBILITY GOAL STATUS: HCPCS

## 2018-07-31 PROCEDURE — 97165 OT EVAL LOW COMPLEX 30 MIN: CPT

## 2018-07-31 PROCEDURE — G8998 SWALLOW D/C STATUS: HCPCS

## 2018-07-31 PROCEDURE — 94761 N-INVAS EAR/PLS OXIMETRY MLT: CPT

## 2018-07-31 PROCEDURE — 36415 COLL VENOUS BLD VENIPUNCTURE: CPT | Performed by: FAMILY MEDICINE

## 2018-07-31 PROCEDURE — 74011000250 HC RX REV CODE- 250: Performed by: FAMILY MEDICINE

## 2018-07-31 PROCEDURE — G8997 SWALLOW GOAL STATUS: HCPCS

## 2018-07-31 PROCEDURE — 74011000258 HC RX REV CODE- 258: Performed by: INTERNAL MEDICINE

## 2018-07-31 PROCEDURE — 93880 EXTRACRANIAL BILAT STUDY: CPT

## 2018-07-31 PROCEDURE — 65660000000 HC RM CCU STEPDOWN

## 2018-07-31 PROCEDURE — 92610 EVALUATE SWALLOWING FUNCTION: CPT

## 2018-07-31 PROCEDURE — 80061 LIPID PANEL: CPT | Performed by: FAMILY MEDICINE

## 2018-07-31 PROCEDURE — 97530 THERAPEUTIC ACTIVITIES: CPT

## 2018-07-31 PROCEDURE — 96374 THER/PROPH/DIAG INJ IV PUSH: CPT

## 2018-07-31 PROCEDURE — G8988 SELF CARE GOAL STATUS: HCPCS

## 2018-07-31 PROCEDURE — 97116 GAIT TRAINING THERAPY: CPT

## 2018-07-31 PROCEDURE — G8978 MOBILITY CURRENT STATUS: HCPCS

## 2018-07-31 PROCEDURE — G8996 SWALLOW CURRENT STATUS: HCPCS

## 2018-07-31 PROCEDURE — G8987 SELF CARE CURRENT STATUS: HCPCS

## 2018-07-31 PROCEDURE — A9585 GADOBUTROL INJECTION: HCPCS | Performed by: INTERNAL MEDICINE

## 2018-07-31 PROCEDURE — 94760 N-INVAS EAR/PLS OXIMETRY 1: CPT

## 2018-07-31 PROCEDURE — 74011250637 HC RX REV CODE- 250/637: Performed by: FAMILY MEDICINE

## 2018-07-31 PROCEDURE — 70553 MRI BRAIN STEM W/O & W/DYE: CPT

## 2018-07-31 PROCEDURE — 70548 MR ANGIOGRAPHY NECK W/DYE: CPT

## 2018-07-31 PROCEDURE — 83036 HEMOGLOBIN GLYCOSYLATED A1C: CPT | Performed by: FAMILY MEDICINE

## 2018-07-31 PROCEDURE — 97161 PT EVAL LOW COMPLEX 20 MIN: CPT

## 2018-07-31 RX ORDER — MELATONIN
1000 DAILY
Status: DISCONTINUED | OUTPATIENT
Start: 2018-07-31 | End: 2018-08-01 | Stop reason: HOSPADM

## 2018-07-31 RX ORDER — SODIUM CHLORIDE 0.9 % (FLUSH) 0.9 %
5-10 SYRINGE (ML) INJECTION AS NEEDED
Status: DISCONTINUED | OUTPATIENT
Start: 2018-07-31 | End: 2018-08-01 | Stop reason: HOSPADM

## 2018-07-31 RX ORDER — SODIUM CHLORIDE 0.9 % (FLUSH) 0.9 %
20 SYRINGE (ML) INJECTION
Status: COMPLETED | OUTPATIENT
Start: 2018-07-31 | End: 2018-07-31

## 2018-07-31 RX ORDER — LATANOPROST 50 UG/ML
1 SOLUTION/ DROPS OPHTHALMIC
Status: DISCONTINUED | OUTPATIENT
Start: 2018-07-31 | End: 2018-08-01 | Stop reason: HOSPADM

## 2018-07-31 RX ORDER — SODIUM CHLORIDE 0.9 % (FLUSH) 0.9 %
5-10 SYRINGE (ML) INJECTION EVERY 8 HOURS
Status: DISCONTINUED | OUTPATIENT
Start: 2018-07-31 | End: 2018-08-01 | Stop reason: HOSPADM

## 2018-07-31 RX ORDER — ASPIRIN 81 MG/1
81 TABLET ORAL DAILY
Status: DISCONTINUED | OUTPATIENT
Start: 2018-07-31 | End: 2018-08-01 | Stop reason: HOSPADM

## 2018-07-31 RX ORDER — SODIUM CHLORIDE 9 MG/ML
125 INJECTION, SOLUTION INTRAVENOUS CONTINUOUS
Status: DISPENSED | OUTPATIENT
Start: 2018-07-31 | End: 2018-08-01

## 2018-07-31 RX ADMIN — SODIUM CHLORIDE 125 ML/HR: 900 INJECTION, SOLUTION INTRAVENOUS at 03:14

## 2018-07-31 RX ADMIN — SODIUM CHLORIDE 100 ML: 900 INJECTION, SOLUTION INTRAVENOUS at 15:08

## 2018-07-31 RX ADMIN — ASPIRIN 81 MG: 81 TABLET, DELAYED RELEASE ORAL at 10:03

## 2018-07-31 RX ADMIN — Medication 20 ML: at 11:15

## 2018-07-31 RX ADMIN — GADOBUTROL 8 ML: 604.72 INJECTION INTRAVENOUS at 15:08

## 2018-07-31 RX ADMIN — VITAMIN D, TAB 1000IU (100/BT) 1000 UNITS: 25 TAB at 10:03

## 2018-07-31 RX ADMIN — LATANOPROST 1 DROP: 50 SOLUTION OPHTHALMIC at 03:19

## 2018-07-31 RX ADMIN — SODIUM CHLORIDE 125 ML/HR: 900 INJECTION, SOLUTION INTRAVENOUS at 16:09

## 2018-07-31 NOTE — PROGRESS NOTES
Reason for Admission: The patient presented with altered mental status, rule-out CVA RRAT Score:  12 Plan for utilizing home health:  TBD- CM will await further PT/OT evaluation- PT currently stating neuro outpatient vs. Inpatient rehab depending progress- awaiting OT evaluation Likelihood of Readmission:  Low Transition of Care Plan:  TBD- CM will await final recommendations from PT/OT The CM met with the patient at bedside in order to introduce the role of CM and assess for patient needs. The patient lives at home with his spouse, and endorses being independent prior to hospitalization. The patient denied use of DME in the home. The patient verified address and insurance information. The patient endorsed that family will provide transportation upon discharge. The CM will await further PT/OT evaluation for services needed upon discharge- currently outpatient neuro PT vs. Inpatient rehab pending progress- CM will await further evaluation and determination. The patient denied difficulty affording or accessing medications prior to hospitalization. The CM will continue to follow. MANUEL Gallegos Care Management Interventions PCP Verified by CM: Yes (Patient verified PCP as Dr. Charlotte Marvin) Mode of Transport at Discharge: Other (see comment) (Anticipate family will provide transport upon discharge) Transition of Care Consult (CM Consult): Discharge Planning MyChart Signup: No 
Discharge Durable Medical Equipment: No 
Health Maintenance Reviewed: Yes Physical Therapy Consult: Yes Occupational Therapy Consult: Yes Speech Therapy Consult: Yes Current Support Network: Lives with Spouse, Own Home Confirm Follow Up Transport: Family Plan discussed with Pt/Family/Caregiver: Yes [employer] Information Provided?: No 
Discharge Location Discharge Placement: Unable to determine at this time (CM will await further PT/OT evaluation for services needed upon discharge)

## 2018-07-31 NOTE — PROGRESS NOTES
TRANSFER - IN REPORT: 
 
Verbal report received from [address] RN(name) on Tremayne Rolon  being received from ED(unit) for routine progression of care Report consisted of patients Situation, Background, Assessment and  
Recommendations(SBAR). Information from the following report(s) SBAR, Kardex and ED Summary was reviewed with the receiving nurse. Opportunity for questions and clarification was provided. Assessment completed upon patients arrival to unit and care assumed.

## 2018-07-31 NOTE — PROGRESS NOTES
Problem: Self Care Deficits Care Plan (Adult) Goal: *Acute Goals and Plan of Care (Insert Text) Occupational Therapy Goals Initiated 7/31/2018 1. Patient will perform grooming standing with no LOB with modified independence within 7 day(s). 2.  Patient will perform bathing standing with supervision/set-up within 7 day(s). 3.  Patient will perform upper body dressing and lower body dressing with supervision/set-up within 7 day(s). 4.  Patient will perform toilet transfers with modified independence within 7 day(s). 5.  Patient will perform all aspects of toileting with modified independence within 7 day(s). 6.  Patient will participate in upper extremity therapeutic exercise/activities with independence for 10 minutes within 7 day(s). 7.  Patient will participate in FM/handwriting tasks with IND within 7 days. 8.  Patient will improve their Fugl Silverio score of LUE by 5 points within 7 days. Occupational Therapy EVALUATION Patient: Donta Olson (81 y.o. male) Date: 7/31/2018 Primary Diagnosis: Altered mental status Ataxia Stroke [facility]) Fall Precautions:   Fall ASSESSMENT : 
Based on the objective data described below, the patient presents with impaired oculomotor movements with additional eye shifts with visual tracking/saccades, impaired FM/GM coordination LUE, disdiadochokinesia and dysmetria of LUE, impaired standing balance, limited AROM LUE (h/o L TSR), flat affect, decreased insight into deficits, ataxia, following admission for fall, ataxia, dysarthria and AMS. Pt received OOB in chair, alert, oriented x4, wife at bedside. ADLs overall supervision/set-up to modA, primarily related d/t balance, strength, coordination, ataxia. Pt scored 58/66 on Fugl Silverio of LUE (L hand dominant), indicative of mild impairment. Pt MRI results back (pt not informed by MD upon assessment) and suggestive of acute right internal capsule/thalamus infarct.  Pt is typically active in racketball/golf, drives, and IND with ADLs. Pt is high fall risk at this time and below safe and independent level with ADL/IADL routine; strongly recommend inpatient rehab at discharge as pt (+) acute CVA and below baseline. Next session: dynamic standing ADLs, handwriting/FM tasks (provided handwriting booklet at bedside), fall prevention education/training. Patient will benefit from skilled intervention to address the above impairments. Patients rehabilitation potential is considered to be Good Factors which may influence rehabilitation potential include:  
[]                None noted []                Mental ability/status [x]                Medical condition []                Home/family situation and support systems []                Safety awareness []                Pain tolerance/management 
[]                Other: PLAN : 
Recommendations and Planned Interventions: 
[x]                  Self Care Training                  [x]           Therapeutic Activities [x]                  Functional Mobility Training    [x]           Cognitive Retraining 
[x]                  Therapeutic Exercises           [x]           Endurance Activities [x]                  Balance Training                   [x]           Neuromuscular Re-Education [x]                  Visual/Perceptual Training     [x]      Home Safety Training 
[x]                  Patient Education                 [x]           Family Training/Education []                  Other (comment): Frequency/Duration: Patient will be followed by occupational therapy 5 times a week to address goals. Discharge Recommendations: Inpatient Rehab Further Equipment Recommendations for Discharge: TBD at rehab SUBJECTIVE:  
Patient stated I was dizzy and had slurred speech.  OBJECTIVE DATA SUMMARY:  
HISTORY:  
Past Medical History:  
Diagnosis Date  Degenerative arthritis of knee 12/7/2009  Dermatitis,nonspecific 12/7/2009  Dyslipidemia 12/7/2009  Glaucoma 12/7/2009 Past Surgical History:  
Procedure Laterality Date  HX SKIN BIOPSY  04/15  
 on forehead, pre-melanoma Prior Level of Function/Environment/Context: Pt lives with wife, drives, IND at baseline. Plays racketball/golf. Recent R knee cortisol shot d/t chronic pain/swelling. Baseline glaucoma. Occupations in which the patient is/was successful, what are the barriers preventing that success:  
Performance Patterns (routines, roles, habits, and rituals):  
Personal Interests and/or values:  
Expanded or extensive additional review of patient history:  
 
Home Situation Home Environment: Private residence # Steps to Enter: 3 Rails to Enter: Yes One/Two Story Residence: Two story # of Interior Steps: 12 Height of Each Step (in): 8 inches Interior Rails: Left Living Alone: No 
Support Systems: Spouse/Significant Other/Partner Patient Expects to be Discharged to[de-identified] Private residence Current DME Used/Available at Home: None Tub or Shower Type: Shower Hand dominance: Left EXAMINATION OF PERFORMANCE DEFICITS: 
Cognitive/Behavioral Status: 
Neurologic State: Alert Orientation Level: Oriented X4 Cognition: Follows commands; Appropriate for age attention/concentration Perception: Appears intact Perseveration: No perseveration noted Safety/Judgement: Awareness of environment;Decreased insight into deficits Skin: please see nursing notes Edema: none noted in BUEs Hearing: Auditory Auditory Impairment: None Vision/Perceptual:   
Tracking: Able to track stimulus in all quadrants w/o difficulty; Requires cues, head turns, or add eye shifts to track Saccades: Additional eye shifts occurred during testing Diplopia: No   
Acuity: Able to read clock/calendar on wall without difficulty; Able to read employee name badge without difficulty Corrective Lenses: Glasses Range of Motion: 
 
AROM: Generally decreased, functional 
PROM: Within functional limits Strength: 
 
Strength: Generally decreased, functional 
  
  
  
  
 
Coordination: 
Coordination: Generally decreased, functional 
Fine Motor Skills-Upper: Left Impaired;Right Intact Gross Motor Skills-Upper: Left Impaired;Right Intact Tone & Sensation: 
 
Tone: Normal 
Sensation: Intact Balance: 
Sitting: Intact Standing: Impaired; Without support Standing - Static: Fair Standing - Dynamic : Fair Functional Mobility and Transfers for ADLs: 
Bed Mobility: 
Supine to Sit:  (NT recevied OOB) Transfers: 
Sit to Stand: Contact guard assistance Functional Transfers Toilet Transfer : Contact guard assistance (inferred) ADL Assessment: 
Feeding: Supervision;Setup Oral Facial Hygiene/Grooming: Contact guard assistance (inferred if standing ) Bathing: Moderate assistance (inferred if standing d/t balance) Upper Body Dressing: Minimum assistance (inferred if standing) Lower Body Dressing: Minimum assistance; Moderate assistance (inferred if standing) Toileting: Contact guard assistance;Minimum assistance (inferred) ADL Intervention and task modifications: 
  
 
  
 
  
 
  
 
  
 
  
 
  
 
Cognitive Retraining Safety/Judgement: Awareness of environment;Decreased insight into deficits Functional Measure:  
Fugl-Silverio Assessment of Motor Recovery after Stroke:  
 
Reflex Activity Flexors/Biceps/Fingers: Can be elicited Extensors/Triceps: Can be elicited Reflex Subtotal: 4 Volitional Movement Within Synergies Shoulder Retraction: Full Shoulder Elevation: Full Shoulder Abduction (90 degrees): Full Shoulder External Rotation: Full Elbow Flexion: Full Forearm Supination: Full Shoulder Adduction/Internal Rotation: Full Elbow Extension: Full Forearm Pronation: Full Subtotal: 18 
 
Volitional Movement Mixing Synergies Hand to Lumbar Spine: Full Shoulder Flexion (0-90 degrees): Partial 
Pronation-Supination: Partial 
Subtotal: 4 Volitional Movement With Little or No Synergy Shoulder Abduction (0-90 degrees): Partial 
Shoulder Flexion ( degrees): Partial 
Pronation/Supination: Partial 
Subtotal : 3 Normal Reflex Activity Biceps, Triceps, Finger Flexors: Full Subtotal : 2 Upper Extremity Total  
Upper Extremity Total: 31 Wrist 
Stability at 15 Degree Dorsiflexion: Full Repeated Dorsiflexion/ Volar Flexion: Full Stability at 15 Degree Dorsiflexion: Full Repeated Dorsiflexion/ Volar Flexion: Full Circumduction: Partial 
Wrist Total: 9 Hand Mass Flexion: Full Mass Extension: Full Grasp A: Full Grasp B: Full Grasp C: Full Grasp D: Full Grasp E: Full Hand Total: 14 
 
Coordination/Speed Tremor: None Dysmetria: Slight Time: 2-5s Coordination/Speed Total : 4 Total A-D Total A-D (Motor Function): 58/66 Percentage of impairment CH 
0% CI 
1-19% CJ 
20-39% CK 
40-59% CL 
60-79% CM 
80-99% CN 
100% Fugl-Silverio score: 0-66 66 53-65 39-52 26-38 13-25 1-12 
 0 This is a reliable/valid measure of arm function after a neurological event. It has established value to characterize functional status and for measuring spontaneous and therapy-induced recovery; tests proximal and distal motor functions. Fugl-Silverio Assessment  UE scores recorded between five and 30 days post neurologic event can be used to predict UE recovery at six months post neurologic event. Severe = 0-21 points Moderately Severe = 22-33 points Moderate = 34-47 points Mild = 48-66 points SIMONE Hebert, MEGAN Licea, & OSWALDO Rivas (1992). Measurement of motor recovery after stroke: Outcome assessment and sample size requirements. Stroke, 23, pp. 7980-2662.  
------------------------------------------------------------------------------------------------------------------------------------------------------------------ MCID: 
Stroke: Logan Kurtz al, 2001; n = 171; mean age 79 (6) years; assessed within 17 (12) days of stroke, Acute Stroke) FMA Motor Scores from Admission to Discharge 10 point increase in FMA Upper Extremity = 1.5 change in discharge FIM  
10 point increase in FMA Lower Extremity = 1.9 change in discharge FIM 
MDC:  
Stroke:  
Epimenio Libman et al, 2008, n = 14, mean age = 59.9 (14.6) years, assessed on average 14 (6.5) months post stroke, Chronic Stroke) FMA = 5.2 points for the Upper Extremity portion of the assessment Barthel Index: 
 
Bathin Bladder: 10 Bowels: 10 
Groomin Dressin Feeding: 10 Mobility: 0 Stairs: 0 Toilet Use: 5 Transfer (Bed to Chair and Back): 10 Total: 60 Barthel and G-code impairment scale: 
Percentage of impairment CH 
0% CI 
1-19% CJ 
20-39% CK 
40-59% CL 
60-79% CM 
80-99% CN 
100% Barthel Score 0-100 100 99-80 79-60 59-40 20-39 1-19 
 0 Barthel Score 0-20 20 17-19 13-16 9-12 5-8 1-4 0 The Barthel ADL Index: Guidelines 1. The index should be used as a record of what a patient does, not as a record of what a patient could do. 2. The main aim is to establish degree of independence from any help, physical or verbal, however minor and for whatever reason. 3. The need for supervision renders the patient not independent. 4. A patient's performance should be established using the best available evidence. Asking the patient, friends/relatives and nurses are the usual sources, but direct observation and common sense are also important. However direct testing is not needed. 5. Usually the patient's performance over the preceding 24-48 hours is important, but occasionally longer periods will be relevant. 6. Middle categories imply that the patient supplies over 50 per cent of the effort. 7. Use of aids to be independent is allowed. Sheldon Ventura., Barthel, D.W. ([id number]). Functional evaluation: the Barthel Index. [facility] (14)2. Sandria Cogan [place], J.J.M.F, Danni Kohler, Jelly Rueda, Blanca, [address] [place] [address] ().  Measuring the change indisability after inpatient rehabilitation; comparison of the responsiveness of the Barthel Index and Functional [place] Measure. Journal of Neurology, Neurosurgery, and Psychiatry, 66(4), 786-209. DELFINA Kinney, DAVE Gamez, & Stevenson Maravilla M.A. (2004.) Assessment of post-stroke quality of life in cost-effectiveness studies: The usefulness of the Barthel Index and the EuroQoL-5D. [facility], 99, 115-49 G codes: In compliance with CMSs Claims Based Outcome Reporting, the following G-code set was chosen for this patient based on their primary functional limitation being treated: The outcome measure chosen to determine the severity of the functional limitation was the [facility] with a score of 58/66 which was correlated with the impairment scale. ? Self Care:  
  - CURRENT STATUS: CJ - 20%-39% impaired, limited or restricted  - GOAL STATUS: CI - 1%-19% impaired, limited or restricted  - D/C STATUS:  ---------------To be determined--------------- Occupational Therapy Evaluation Charge Determination History Examination Decision-Making LOW Complexity : Brief history review  MEDIUM Complexity : 3-5 performance deficits relating to physical, cognitive , or psychosocial skils that result in activity limitations and / or participation restrictions MEDIUM Complexity : Patient may present with comorbidities that affect occupational performnce. Miniml to moderate modification of tasks or assistance (eg, physical or verbal ) with assesment(s) is necessary to enable patient to complete evaluation Based on the above components, the patient evaluation is determined to be of the following complexity level: LOW Activity Tolerance: VSS Please refer to the flowsheet for vital signs taken during this treatment.  
After treatment:  
[x]  Patient left in no apparent distress sitting up in chair 
[]  Patient left in no apparent distress in bed 
[x]  Call bell left within reach [x]  Nursing notified 
[x]  Caregiver present 
[]  Bed alarm activated COMMUNICATION/EDUCATION:  
The patients plan of care was discussed with: Physical Therapist and Registered Nurse. Patient and/or family was verbally educated on the BE FAST acronym for signs/symptoms of CVA and TIA. BE FAST was written on patient's communication board  for visual education and reinforcement. All questions answered with patient indicating good understanding. [x]      Home safety education was provided and the patient/caregiver indicated understanding. [x]      Patient/family have participated as able and agree with findings and recommendations. []      Patient is unable to participate in plan of care at this time. This patients plan of care is appropriate for delegation to [facility]. Thank you for this referral. 
Tin Cruz OT Time Calculation: 23 mins

## 2018-07-31 NOTE — ED PROVIDER NOTES
Patient is a 81 y.o. male presenting with context. Dysarthria There was left facial focality noted. Associated symptoms include confusion. Pertinent negatives include no shortness of breath, no chest pain, no vomiting, no headaches and no nausea. 81year old male with high cholesterol, arthritis, presents with feeling off balance \"like I'm intoxicated\" and slurred speech. Unclear onset. States he fell Tuesday morning playing  Racketball. Nothing unusual about the fall, didn't pass out. Injured right knee. Saw his doctor on Friday and had a steroid injection. On Saturday his wife said he started not acting right. Face was flushed, slept all day. Did go out with friends that evening and did ok. Came right back and went to bed. Sunday seemed a little better. Still not himself. Drove himself to work today. Did ok. Feels like his thinking is off. Mina and son in law talked to him today and stated his speech is slurred. He denies head injury/headache. Denies change in vision. Denies focal weakness or numbness. No chest pain palpitaitons or SOB. Never had before. Eating/dirnhking ok, no increased thirst or urination. Not a diabetic. No fevers, redness or unusual swelling ot knee. Past Medical History:  
Diagnosis Date  Degenerative arthritis of knee 12/7/2009  Dermatitis,nonspecific 12/7/2009  Dyslipidemia 12/7/2009  Glaucoma 12/7/2009 Past Surgical History:  
Procedure Laterality Date  HX SKIN BIOPSY  04/15  
 on forehead, pre-melanoma Family History:  
Problem Relation Age of Onset  Cancer Mother  Heart Disease Father Social History Social History  Marital status:  Spouse name: N/A  
 Number of children: N/A  
 Years of education: N/A Occupational History  Not on file. Social History Main Topics  Smoking status: Never Smoker  Smokeless tobacco: Never Used  Alcohol use Yes Comment: not to excess  Drug use:  No  Sexual activity: Yes  
  Partners: Female Other Topics Concern  Not on file Social History Narrative ALLERGIES: Pravachol [pravastatin] and Sulfa (sulfonamide antibiotics) Review of Systems Constitutional: Negative for fever. HENT: Negative for congestion. Eyes: Negative for visual disturbance. Respiratory: Negative for cough and shortness of breath. Cardiovascular: Negative for chest pain. Gastrointestinal: Negative for abdominal pain, nausea and vomiting. Endocrine: Negative for polyuria. Genitourinary: Negative for dysuria. Musculoskeletal: Positive for gait problem. Skin: Negative for rash. Neurological: Positive for facial asymmetry. Negative for dizziness and headaches. Psychiatric/Behavioral: Positive for confusion. Vitals:  
 07/30/18 2025 07/30/18 2200 07/30/18 2215 BP: 154/76 132/75 147/76 Pulse: 68 (!) 56 60 Resp: 16 14 12 Temp: 97.8 °F (36.6 °C) SpO2: 97% 95% 98% Weight: 78.3 kg (172 lb 9 oz) Height: 5' 9\" (1.753 m) Physical Exam  
Constitutional: He is oriented to person, place, and time. He appears well-developed and well-nourished. No distress. HENT:  
Head: Normocephalic and atraumatic. Mouth/Throat: Oropharynx is clear and moist. No oropharyngeal exudate. Eyes: Conjunctivae and EOM are normal. Pupils are equal, round, and reactive to light. Right eye exhibits no discharge. Left eye exhibits no discharge. No scleral icterus. Neck: Normal range of motion. Neck supple. No JVD present. Cardiovascular: Normal rate, regular rhythm, normal heart sounds and intact distal pulses. Exam reveals no gallop and no friction rub. No murmur heard. Pulmonary/Chest: Effort normal and breath sounds normal. No stridor. No respiratory distress. He has no wheezes. He has no rales. He exhibits no tenderness. Abdominal: Soft. Bowel sounds are normal. He exhibits no distension and no mass. There is no tenderness.  There is no rebound and no guarding. Musculoskeletal: Normal range of motion. He exhibits no edema or tenderness. Neurological: He is alert and oriented to person, place, and time. He has normal reflexes. No cranial nerve deficit. He exhibits normal muscle tone. Coordination normal.  
Slight facial droop on left Ataxic gait Skin: Skin is warm and dry. No rash noted. No erythema. Face slightly flusehd Psychiatric: He has a normal mood and affect. His behavior is normal. Judgment and thought content normal.  
  
 
MDM 
 
 
ED Course Procedures Wife states she feels the facial droop may be more pronounced today- can not be sure. Spokee with Teleneurology- states likely a subacute thalamic stroke. Does not recommend any further work up in the ED, admit for stroke eval. Dr. Alexander Grade agreeable to admit. ED EKG interpretation: 
Rhythm: sinus bradycardia; and regular . Rate (approx.): 58; Axis: left axis deviation; P wave: normal; QRS interval: prolonged; ST/T wave: non-specific changes;. This EKG was interpreted by Wanda Wilkinson MD,ED Provider.

## 2018-07-31 NOTE — CONSULTS
NEUROLOGY CONSULT NOTE    Name Racheal Bright Age 81 y.o. MRN [id number]  1945     Consulting Physician: Charisma Lozoya MD      Chief Complaint:  Dysarthria, imbalance     Assessment:     Principal Problem:    Ataxia (2018)    Active Problems:    Dysarthria (2018)      Fall (2018)      Stroke ([address] (2018)      81year old LHM h/o HPL, glaucoma presenting with dizziness, dysarthria, gait instability since 18. Evidence of gait instability and LUE ataxia on examination today concerning for posterior circulation infarct. Evidence of R IC hypodensity on head CT from admission. Recommendations:   MRI Brain/MRA head and neck- if evidence of acute/subacute ischemia, recommend transitioning to Plavix monotherapy for stroke prevention. Cont. Statin therapy, goal LDL<70  Goal SBP<140  Telemetry/TTE  Stroke education  PT/OT/ST assessments  Will F/U studies once completed-please call if further questions    Thank you very much for this referral. I appreciate the opportunity to participate in this patient's care. History of Present Illness: This is a 81 y.o.  left handed  male, we were asked to see for dysarthria, imbalance. PMH notable for HPL, glaucoma. Pt and his wife report onset of symptoms including dysarthria, dizziness/lightheadedenss since  18 (approximately 3 days ago). Patient was noted with excessive fatigue this past Saturday and slept most of the day. He denied focal weakness, numbness, aphasia but did recall some imbalance/gait instability as well as headache with nausea which is unusual for him the evening prior to symptom onset. He feels his constellation of symptoms have improved overall. CT head this admission revealed a right internal capsule hypodensity suggestive of possible chronic infarct but no acute process.   He was taking ASA 81mg and Zocor daily prior to arrival.      Allergies   Allergen Reactions    Pravachol [Pravastatin] Rash    Sulfa (Sulfonamide Antibiotics) Other (comments)     \"fever; bleeding\"        Prior to Admission medications    Medication Sig Start Date End Date Taking? Authorizing Provider   tadalafil (CIALIS) 2.5 mg tablet Take 1 Tab by mouth as needed for Other. 4/23/18  Yes Rd Crow III, DO   simvastatin (ZOCOR) 40 mg tablet TAKE 1 TABLET BY MOUTH NIGHTLY 5/18/18   Rd Crow III, DO   naproxen sodium (ALEVE) 220 mg tablet Take 220 mg by mouth two (2) times daily (with meals). Historical Provider   cholecalciferol (VITAMIN D3) 1,000 unit tablet Take  by mouth daily. Historical Provider   aspirin delayed-release 81 mg tablet Take 81 mg by mouth daily. Historical Provider   latanoprost (XALATAN) 0.005 % ophthalmic solution Administer 1 Drop to both eyes nightly. Dr. Marceil Aase     Historical Provider       Past Medical History:   Diagnosis Date    Degenerative arthritis of knee 12/7/2009    Dermatitis,nonspecific 12/7/2009    Dyslipidemia 12/7/2009    Glaucoma 12/7/2009        Past Surgical History:   Procedure Laterality Date    HX SKIN BIOPSY  04/15    on forehead, pre-melanoma        Social History   Substance Use Topics    Smoking status: Never Smoker    Smokeless tobacco: Never Used    Alcohol use Yes      Comment: not to excess        Family History   Problem Relation Age of Onset    Cancer Mother     Heart Disease Father         Review of Systems:   Comprehensive review of systems performed and negative except for as listed above. Exam:     Visit Vitals    /63 (BP 1 Location: Right arm, BP Patient Position: At rest;Sitting)    Pulse 65    Temp 97.8 °F (36.6 °C)    Resp 13    Ht 5' 9\" (1.753 m)    Wt 76.6 kg (168 lb 14 oz)    SpO2 98%    BMI 24.94 kg/m2        General: Well developed, well nourished.  Patient in no apparent distress   Head: Normocephalic, atraumatic, anicteric sclera   Lungs:  Clear to auscultation bilaterally, No wheezes or rubs   Cardiac: Regular rate and rhythm with no murmurs. Abd: Bowel sounds were audible. No tenderness on palpation   Ext: No pedal edema   Skin: No overt signs of rash     Neurological Exam:  Mental Status: Alert and oriented to person place and time   Speech: Fluent no aphasia, mild dysarthria   Cranial Nerves:   Intact visual fields. Facial sensation is normal. Facial movement is symmetric. Palate is midline. Normal sternocleidomastoid strength. Tongue is midline. Hearing is intact bilaterally. Eyes: PERRL, EOM's full, no nystagmus, no ptosis. Motor:  Full and symmetric strength of upper and lower proximal and distal muscles. Normal bulk and tone. Reflexes:   Deep tendon reflexes 2+/4 and symmetrical.  Plantar response is downgoing b/l. Sensory:   Symmetrically intact  with no perceived deficits modalities involving small or large fibers. Gait:  Mildly ataxic   Tremor:   No tremor noted. Cerebellar:  Slight possible ataxia LUE, otherwise intact FTN/HTS   Neurovascular: No carotid bruits. Imaging  CT Results (maximum last 3): Results from [place] encounter on 07/30/18   CT HEAD WO CONT   Narrative EXAM:  CT HEAD WO CONT    INDICATION:   slurred speech, blurred vision, fall off balanced feeling    COMPARISON: None. CONTRAST:  None. TECHNIQUE: Unenhanced CT of the head was performed using 5 mm images. Brain and  bone windows were generated. CT dose reduction was achieved through use of a  standardized protocol tailored for this examination and automatic exposure  control for dose modulation. FINDINGS:  The ventricles and sulci are normal in size, shape and configuration and  midline. There is a low-density well-defined area at the genu of the internal  capsule on the right extending into the posterior limb which has a chronic  appearance and measures 1.7 x 1 cm. . There is no intracranial hemorrhage,  extra-axial collection, mass, mass effect or midline shift.   The basilar  cisterns are open. No acute infarct is identified. The bone windows demonstrate  no abnormalities. There is slight mucosal thickening right maxillary sinus. .         Impression IMPRESSION: There is a low-density area in the genu of the internal capsule on  the right extending to the posterior limb on the right which has a chronic  appearance. Comparison with any prior studies would be helpful. If none are  available, follow-up imaging would be helpful            MRI Results (maximum last 3): No results found for this or any previous visit. Lab Review  Lab Results   Component Value Date/Time    WBC 5.7 07/30/2018 08:43 PM    HCT 43.1 07/30/2018 08:43 PM    HGB 13.9 07/30/2018 08:43 PM    PLATELET 838 83/92/7662 08:43 PM     Lab Results   Component Value Date/Time    Sodium 142 07/30/2018 08:43 PM    Potassium 3.6 07/30/2018 08:43 PM    Chloride 106 07/30/2018 08:43 PM    CO2 30 07/30/2018 08:43 PM    Glucose 91 07/30/2018 08:43 PM    BUN 21 (H) 07/30/2018 08:43 PM    Creatinine 1.09 07/30/2018 08:43 PM    Calcium 9.2 07/30/2018 08:43 PM     No components found for: TROPQUANT  No results found for: JANELLE    Signed:  Jose Elizabeth.  Gege Mansfield DO  7/31/2018  2:04 PM

## 2018-07-31 NOTE — PROGRESS NOTES
Bedside shift change report given to Nain Torres RN (oncoming nurse) by Dulce Kelly (offgoing nurse). Report included the following information SBAR, Kardex, ED Summary and Cardiac Rhythm Sinus bradycardia.

## 2018-07-31 NOTE — PROGRESS NOTES
Hospitalist Progress Note Yanna Garner MD 
Answering service: [phone] OR [phone] from in house phone Cell: [phone] Date of Service:  2018 NAME:  Ishan Mendoza :  1945 MRN:  [id number] Admitted this AM. Seen and examined at the bedside with wife present. 53-year-old white male with a past medical history of degenerative arthritis, chronic knee pain, hyperlipidemia, glaucoma, presented to the emergency department from home accompanied by his wife with reported initial chief complaint of slurred speech. Neurology has seen. Work-up pending. Rest of plan as per H&P.  
______________________________________________________________________ EXPECTED LENGTH OF STAY: 2d 4h 
ACTUAL LENGTH OF STAY:          1 Yanna Garner MD

## 2018-07-31 NOTE — PROCEDURES
[address]  *** FINAL REPORT ***    Name: Joann Umanzor  MRN: [id number]  : 15 Oct 1945  HIS Order #: [id number]  [address] [facility] Visit #: [id number]  Date: 2018    TYPE OF TEST: Cerebrovascular Duplex    REASON FOR TEST  CVA. Ataxia. Right Carotid:-             Proximal               Mid                 Distal  cm/s  Systolic  Diastolic  Systolic  Diastolic  Systolic  Diastolic  CCA:     53.0                                      37.0  Bulb:  ECA:     50.0  ICA:     38.0      11.0       45.0      12.0       44.0      13.0  ICA/CCA:  1.0    ICA Stenosis:    Right Vertebral:-  Finding: Antegrade  Sys:       54.0  Andria:       15.0    Right Subclavian:    Left Carotid:-            Proximal                Mid                 Distal  cm/s  Systolic  Diastolic  Systolic  Diastolic  Systolic  Diastolic  CCA:     42.8                                      74.0  Bulb:  ECA:     59.0  ICA:     29.0       8.0       48.0      14.0       60.0      20.0  ICA/CCA:  0.4    ICA Stenosis:    Left Vertebral:-  Finding: Antegrade  Sys:       40.0  Andria:        9.0    Left Subclavian:    INTERPRETATION/FINDINGS  PROCEDURE:  Color duplex ultrasound imaging of extracranial  cerebrovascular arteries. FINDINGS:       Right:  Internal carotid velocity is within normal limits, there  is no observed narrowing of the flow channel on color Doppler imaging,   and no plaque is visualized on B-mode imaging. The common and  external carotid arteries are patent and without evidence of  hemodynamically significant stenosis. There is minimal plaque at the  carotid bifurcation. Left:   Internal carotid velocity is within normal limits, there  is no observed narrowing of the flow channel on color Doppler imaging,   and no plaque is visualized on B-mode imaging. The common and  external carotid arteries are patent and without evidence of  hemodynamically significant stenosis.   There is minimal plaque at the  carotid bifurcation. IMPRESSION:  Consistent with no stenosis of the right internal carotid   and no stenosis of the left internal carotid. Vertebrals are patent  with antegrade flow. ADDITIONAL COMMENTS    I have personally reviewed the data relevant to the interpretation of  this  study.     TECHNOLOGIST: Stephane Russell RVT  Signed: 07/31/2018 12:17 PM    PHYSICIAN: Lexy Nettles MD  Signed: 08/01/2018 06:49 AM

## 2018-07-31 NOTE — PROGRESS NOTES
Problem: Falls - Risk of 
Goal: *Absence of Falls Document Nevaeh Latin Fall Risk and appropriate interventions in the flowsheet. Outcome: Progressing Towards Goal 
Fall Risk Interventions:

## 2018-07-31 NOTE — ED TRIAGE NOTES
A week ago I fell and messed up my R knee. Did not hit my head. Fri. Am I had a little blurred vision. [facility] in my knee because I was having trouble walking on my R leg. Since the injection I have felt off balance , had slurred speech and some blurred vision. No H/A.

## 2018-07-31 NOTE — H&P
[address]  HISTORY AND PHYSICAL Kevin Dietz MR#: [id number] : 1945 ACCOUNT #: [de-identified] ADMIT DATE: 2018 CHIEF COMPLAINT:  Patient does not provide. HISTORY OF PRESENT ILLNESS:  A 82-year-old white male with a past medical history of degenerative arthritis, chronic knee pain, hyperlipidemia, glaucoma, presented to the emergency department from home accompanied by his wife with reported initial chief complaint of slurred speech. The patient notes the reason why he came to the ED was that his wife made him come. His wife notes the patient has not been acting right, noting that he was feeling \"intoxicated,\" feeling off balance, with ataxia, and she noticed slurred speech. The patient reportedly has been confused, according to the ER records. Patient reportedly had a fall approximately 1 week ago. He subsequently had a pain in his right knee, which had been evaluated for injury. He reportedly had an injection in his right knee approximately 4 days ago by his doctor. Subsequently, he continued to have symptoms of slurred speech. Symptoms remain constant without specific known aggravating or alleviating factors. There are no reports of head and neck trauma during this recent fall. He has not complained of syncope, loss of consciousness, new-onset numbness, facial droop, focal weakness, diplopia, current chest pain, shortness of breath, cough, congestion, abdominal pain, vomiting, diarrhea, melena, dysuria, hematuria, calf pain, swelling, edema, fever, chills, or rash. He had complaints of nausea. On arrival to the emergency department, his recorded  vital signs were blood pressure 154/76, heart rate 68, respiratory rate of 16, O2 saturation 97% on room air.   His workup, including a CT of the head, without contrast showed low density area in the genu of the internal capsule on the right extending to the posterior limb on the right, which may be a chronic appearance, per the radiologist report. Per the ED, tele neurologist felt the patient may have a subacute thalamic stroke and recommended the patient be admitted for further stroke workup. PAST MEDICAL HISTORY: 
1. Degenerative arthritis. 2.  Dermatitis, unspecified. 3.  Dyslipidemia. 4.  Glaucoma. 5.  Right knee pain, status post fall. PAST SURGICAL HISTORY:  Skin biopsy of forehead in 2015. MEDICATIONS:  Complete medication list reviewed and noted in the chart records. Taking Last Dose Start Date End Date Provider    
   aspirin delayed-release 81 mg tablet  7/30/2018  --  --  Historical Provider  
   Take 81 mg by mouth daily.  
   cholecalciferol (VITAMIN D3) 1,000 unit tablet  7/29/2018  --  --  Historical Provider  
   Take  by mouth daily.    
   latanoprost (XALATAN) 0.005 % ophthalmic solution  7/29/2018  --  --  Historical Provider  
   Administer 1 Drop to both eyes nightly. Dr. Fletcher Stark   
   naproxen sodium (ALEVE) 220 mg tablet  7/29/2018  --  --  Historical Provider  
   Take 220 mg by mouth two (2) times daily (with meals).  
   Notes:   Per patient, takes 4 per day  
   simvastatin (ZOCOR) 40 mg tablet  7/29/2018 05/18/18  -- Dori GERONIMO Czajkowski III, DO  
   TAKE 1 TABLET BY MOUTH NIGHTLY  
   tadalafil (CIALIS) 2.5 mg tablet  6/30/2018 04/23/18  -- Dori GERONIMO Czajkowski III, DO  
   Take 1 Tab by mouth as needed for Other.  
   
 
 
ALLERGIES:  PRAVACHOL, SULFA DRUGS. SOCIAL HISTORY:  Patient does not report any smoking,  occasional alcohol, and no reports of illicit drugs. . FAMILY HISTORY:  Cancer, unspecified, in mother. Heart disease in father. REVIEW OF SYSTEMS:  Pertinent positives as noted in the HPI. All other systems were reviewed and negative. PHYSICAL EXAMINATION: 
VITAL SIGNS:  Temperature is 97.8 degrees Fahrenheit, blood pressure 143/73, heart rate of 66, respiratory rate of 14 and O2 saturation 99% on room air.   Recorded weight 172 pounds (59 kg). Height is 5 feet 9 inches tall. GENERAL:  Patient in no acute respiratory distress. PSYCHIATRIC:  Patient is awake, alert and oriented x3. Flat affect. Reluctantly provides answers to questions. NEUROLOGIC:  GCS 15. Moves extremities x4. Sensation grossly intact. No slurred speech, no facial droop, no pronator drift. HEENT:  Normocephalic, atraumatic. PERRLA is intact. Sclerae are anicteric. Conjunctivae are clear. Nares are patent. Oropharynx clear. Tongue is midline, not edematous. NECK:  Supple, without lymphadenopathy, JVD, crepitus or thyromegaly. LYMPH:  Negative for cervical or supraclavicular adenopathy. RESPIRATORY:  Lungs clear to auscultation bilaterally. HEART:  Regular rate and rhythm. S1, S2, without murmurs, rubs or gallops. GASTROINTESTINAL:  Abdomen is soft, nontender, nondistended, normoactive bowel sounds. No rebound, guarding, rigidity. No auscultated abdominal bruits or pulsatile mass. BACK:  No CVA tenderness. No step-off deformity. MUSCULOSKELETAL:  No acute palpable bony deformity. Negative for calf tenderness. VASCULAR:  2+ radial, 1+ dorsalis pedis pulses, without cyanosis, clubbing or edema. SKIN:  Warm and dry. LABORATORY DATA:  Reviewed as follows:  Sodium 142, potassium 3.6, chloride 106, CO2 of 30, BUN of 21, creatinine 1.09, glucose of 91, anion gap is 6, calcium is 9.2. GFR greater than 60, total bilirubin 0.5, total protein 7.5, albumin 3.7, ALT 28, AST 21, alkaline phosphatase 63. WBC 5.7, hemoglobin 13.9, hematocrit of 43.1, platelets of 536. CT of the head without contrast:  Results are reviewed and noted in HPI. A 12-lead EKG:  Sinus bradycardia, left anterior fascicular block, left ventricular hypertrophy, 58 beats per minute. ASSESSMENT AND PLAN: 
1. Altered mental status. Admit patient to neuro/stroke floor. Place on neurovascular check and fall precautions.   Order MRI of the brain, carotid Dopplers and cardiogram as part of stroke evaluation. 2.  Stroke -- possible given recent nonspecific findings and CT reports and concern from tele neurologist requesting evaluation for the same. We will continue workup as noted above. Consult with neurologist. 
3.  Slurred speech. Order dysphasia screen. 4.  Ataxia. Placed on fall precautions. 5.  Status post fall. Plan as noted above. Consider for physical therapy evaluation. 6.  Nausea, currently resolved. 7.  Elevated blood pressure without history of hypertension. Continue to monitor blood pressure closely and provide antihypertensive meds as needed. 8.  Deep venous thrombosis prophylaxis. SCDs to lower extremities. MD NELLA Fan/ODIN 
D: 07/31/2018 02:24    
T: 07/31/2018 03:38 JOB #: [id number]

## 2018-07-31 NOTE — PROGRESS NOTES
Speech Pathology bedside swallow evaluation/ 
discharge (of swallow) Patient: Carmenza White (76 y.o. male) Date: 7/31/2018 Primary Diagnosis: Altered mental status Ataxia Stroke [facility]) Fall Precautions:     
 
ASSESSMENT : 
Based on the objective data described below, the patient presents with functional oropharyngeal swallow. Timely and complete mastication of solids. Pharyngeal swallow initiation is suspected to be timely and hyolaryngeal elevation/excursion functional via palpation. No overt s/s aspiration with any consistency and patient tolerating regular diet. Patient came in with confusion and slurred speech. Wife reports that for a few days his slurred speech waxed and waned but both patient and wife report it is at baseline this am. Patient spoke clearly and fluently. Will await MRI results to determine if further evaluation of integrated language is indicated. Skilled therapy provided by a speech-language pathologist is not indicated at this time. PLAN : 
Recommendations: 
Regular diet/ thin liquids. meds as tolerated Will f/u pending MRI results to determine if further evaluation is indicated such as integrated language Discharge Recommendations: To Be Determined SUBJECTIVE:  
Patient stated I think my memory is fine. OBJECTIVE:  
 
Past Medical History:  
Diagnosis Date  Degenerative arthritis of knee 12/7/2009  Dermatitis,nonspecific 12/7/2009  Dyslipidemia 12/7/2009  Glaucoma 12/7/2009 Past Surgical History:  
Procedure Laterality Date  HX SKIN BIOPSY  04/15  
 on forehead, pre-melanoma Prior Level of Function/Home Situation: 
Home Situation Home Environment: Private residence # Steps to Enter: 3 One/Two Story Residence: Two story # of Interior Steps: 12 Height of Each Step (in): 8 inches Interior Rails: Left Living Alone: No 
Support Systems: Family member(s), [employer] Patient Expects to be Discharged to[de-identified] Private residence Current DME Used/Available at Home: None Diet prior to admission: regular diet/ thin Current Diet:  Regular/thin  
Cognitive and Communication Status: 
Neurologic State: Alert Orientation Level: Oriented X4 Cognition: Appropriate decision making Perception: Appears intact Perseveration: No perseveration noted Safety/Judgement: Awareness of environment Oral Assessment: 
Oral Assessment Labial: No impairment Dentition: Natural 
Oral Hygiene:  (clean, moist) Lingual: No impairment Velum: Unable to visualize Mandible: No impairment P.O. Trials: 
Patient Position:  (upright in bed) Vocal quality prior to P.O.: No impairment Consistency Presented: Thin liquid; Solid How Presented: Successive swallows;Cup/sip; Self-fed/presented;Spoon Bolus Acceptance: No impairment Bolus Formation/Control: No impairment Propulsion: No impairment Oral Residue: None Initiation of Swallow: No impairment Laryngeal Elevation: Functional 
Aspiration Signs/Symptoms: None Pharyngeal Phase Characteristics: No impairment, issues, or problems Effective Modifications: None Cues for Modifications: None Oral Phase Severity: No impairment Pharyngeal Phase Severity : No impairment NOMS:  
The NOMS functional outcome measure was used to quantify this patient's level of swallowing impairment. Based on the NOMS, the patient was determined to be at level 7 for swallow function G Codes: In compliance with CMSs Claims Based Outcome Reporting, the following G-code set was chosen for this patient based the use of the NOMS functional outcome to quantify this patient's level of swallowing impairment. Using the NOMS, the patient was determined to be at level 7 for swallow function which correlates with the CH= 0% level of severity.  
 
Based on the objective assessment provided within this note, the current, goal, and discharge g-codes are as follows: 
 
Swallow  Swallowing: 
 Swallow Current Status CH= 0% 
 Swallow Goal Status CH= 0% 
 Swallow D/C Status CH= 0% NOMS Swallowing Levels: 
Level 1 (CN): NPO Level 2 (CM): NPO but takes consistency in therapy Level 3 (CL): Takes less than 50% of nutrition p.o. and continues with nonoral feedings; and/or safe with mod cues; and/or max diet restriction Level 4 (CK): Safe swallow but needs mod cues; and/or mod diet restriction; and/or still requires some nonoral feeding/supplements Level 5 (CJ): Safe swallow with min diet restriction; and/or needs min cues Level 6 (CI): Independent with p.o.; rare cues; usually self cues; may need to avoid some foods or needs extra time Level 7 (CH): Independent for all p.o. DONNIE. (2003). National Outcomes Measurement System (NOMS): Adult Speech-Language Pathology User's Guide. Pain: 
Pain Scale 1: Numeric (0 - 10) Pain Intensity 1: 0 After treatment:  
[] Patient left in no apparent distress sitting up in chair 
[x] Patient left in no apparent distress in bed 
[x] Call bell left within reach [x] Nursing notified 
[] Caregiver present 
[] Bed alarm activated COMMUNICATION/EDUCATION:  
The patients plan of care including findings, recommendations, and recommended diet changes were discussed with: Physical Therapist and Registered Nurse. Patient was educated regarding His deficit(s) of possible dysphagia or dysarthria as this relates to His diagnosis of cva workup. He demonstrated Fair understanding as evidenced by verbalization. [] Posted safety precautions in patient's room. [x] Patient/family have participated as able and agree with findings and recommendations. [] Patient is unable to participate in plan of care at this time. Thank you for this referral. 
Pham Lo, SLP Time Calculation: 15 mins

## 2018-07-31 NOTE — ED NOTES
TRANSFER - OUT REPORT: 
 
Verbal report given to Naveen Guerline (name) on Ryan Nj  being transferred to NSTU(unit) for routine progression of care Report consisted of patients Situation, Background, Assessment and  
Recommendations(SBAR). Information from the following report(s) SBAR, ED Summary and Cardiac Rhythm NSR was reviewed with the receiving nurse. Lines:  
Peripheral IV 07/30/18 Right Antecubital (Active) Opportunity for questions and clarification was provided. Patient transported with: 
 Monitor Registered Nurse

## 2018-07-31 NOTE — PROGRESS NOTES
Problem: Discharge Planning Goal: *Discharge to safe environment Outcome: Progressing Towards Goal 
See CM notes.

## 2018-07-31 NOTE — PROGRESS NOTES
Occupational Therapy Note 7/31/2018 
 
1202 Orders acknowledged, chart reviewed. Pt cleared by RN. Attempted to see pt, however, he is currently off floor for testing. Will hold and follow-up later as able and appropriate. 1455 Second attempt, pt again off the floor for imaging, wife present. RN notified. Will follow-up later as able/appropriate.  
 
Linette Quevedo, OTR/L

## 2018-07-31 NOTE — PROGRESS NOTES
Problem: Mobility Impaired (Adult and Pediatric) Goal: *Acute Goals and Plan of Care (Insert Text) Physical Therapy Goals Initiated 7/31/2018 1. Patient will move from supine to sit and sit to supine  and scoot up and down in bed with independence within 7 day(s). 2.  Patient will transfer from bed to chair and chair to bed with independence using the least restrictive device within 7 day(s). 3.  Patient will perform sit to stand with independence within 7 day(s). 4.  Patient will ambulate with modified independence for 350 feet with the least restrictive device within 7 day(s). 5.  Patient will ascend/descend 12 stairs with R handrail(s) with modified independence within 7 day(s). 6.  Patient will improve Angulo Balance score by 7 points within 7 days. physical Therapy EVALUATION- neuro population Patient: Leonie Salcido (54 y.o. male) Date: 7/31/2018 Primary Diagnosis: Altered mental status Ataxia Stroke [facility]) Fall Precautions:     
 
ASSESSMENT : 
Based on the objective data described below, the patient presents with impaired functional mobility as compared to baseline level 2* impaired L sided gross motor coordination (+ L UE FTN ataxia and overshoot), additional eye shifts during occular motor testing, mildly ataxic gait, impaired balance, flat affect, ?intermittent recall deficits, and slight intermittent dysarthria following admission for AMS and possible CVA. CT shows ? chronic R internal capsule infarct. MRI pending at time of eval. Prior to admission, pt/spouse report that he lived at home and was indep with ADLs and mobility w/o use of AD. Pt with recent hx of fall (~1 week ago) while playing racquetball leading to exacerbation of chronic R knee pain, imaging at that time was negative and pt received steroid injection. Pt was cleared for mobility by RN, received upright in bed, agreeable to participation. He was able to mobilize to EOB with SUP without overt difficulty.  No Problem: Patient Care Overview (Adult)  Goal: Plan of Care Review  Outcome: Ongoing (interventions implemented as appropriate)    Goal: Adult Individualization and Mutuality  Outcome: Ongoing (interventions implemented as appropriate)    Goal: Discharge Needs Assessment  Outcome: Ongoing (interventions implemented as appropriate)      Problem: Pain, Acute (Adult)  Goal: Identify Related Risk Factors and Signs and Symptoms  Outcome: Ongoing (interventions implemented as appropriate)    Goal: Acceptable Pain Control/Comfort Level  Outcome: Ongoing (interventions implemented as appropriate)      Problem: Skin Integrity Impairment, Risk/Actual (Adult)  Goal: Identify Related Risk Factors and Signs and Symptoms  Outcome: Ongoing (interventions implemented as appropriate)    Goal: Skin Integrity/Wound Healing  Outcome: Ongoing (interventions implemented as appropriate)         complaint of dizziness with positional changes, BPs stable. Completed sit<>stands with CGA for safety, no major increased postural sway on initial stance. Gait training initiated w/o AD - note +weaving path drifts and mild L sided ataxia initially that improved with duration. Incorporated lateral head movements, directional changes, and gait speed variations with some instability noted but no major LOB. Participated in stair negotiation training x8 stairs using single rail with CGA for safety - demos correct step to pattern without cues. Pt remained up in chair at end of session, NAD. Provided education on current impairments, falls risk, and BE FAST. Answered numerous questions to apparent satisfaction at this time. Currently recommending discharge home with family assist and OP neuro based PT vs IP rehab pending further progress, consistency with presentation, and further work up results. Will follow. Recommend with nursing patient to complete as able in order to maintain strength, endurance and independence: OOB to chair 3x/day with assist x1 and ambulating with assist x1 and gait belt for safety. Thank you for your assistance. Patient will benefit from skilled intervention to address the above impairments. Patients rehabilitation potential is considered to be Good Factors which may influence rehabilitation potential include:  
[x]           None noted 
[]           Mental ability/status []           Medical condition 
[]           Home/family situation and support systems 
[]           Safety awareness 
[]           Pain tolerance/management 
[]           Other: PLAN : 
Recommendations and Planned Interventions: 
[x]             Bed Mobility Training             [x]      Neuromuscular Re-Education [x]             Transfer Training                   []      Orthotic/Prosthetic Training 
[x]             Gait Training                         []      Modalities [x]             Therapeutic Exercises []      Edema Management/Control [x]             Therapeutic Activities            [x]      Patient and Family Training/Education []             Other (comment): Frequency/Duration: Patient will be followed by physical therapy 5 times a week to address goals. Discharge Recommendations: Inpatient Rehab vs Outpatient neuro based PT pending progress and further work up Further Equipment Recommendations for Discharge: TBD, none anticipated SUBJECTIVE:  
Patient stated I have a high pain tolerance.  OBJECTIVE DATA SUMMARY:  
HISTORY:   
Past Medical History:  
Diagnosis Date  Degenerative arthritis of knee 12/7/2009  Dermatitis,nonspecific 12/7/2009  Dyslipidemia 12/7/2009  Glaucoma 12/7/2009 Past Surgical History:  
Procedure Laterality Date  HX SKIN BIOPSY  04/15  
 on forehead, pre-melanoma Prior Level of Function/Home Situation: lives at home with spouse; indep without AD at baseline Personal factors and/or comorbidities impacting plan of care:  
 
Home Situation Home Environment: Private residence # Steps to Enter: 3 Rails to Enter: Yes One/Two Story Residence: Two story # of Interior Steps: 12 Height of Each Step (in): 8 inches Interior Rails: Left Living Alone: No 
Support Systems: Spouse/Significant Other/Partner Patient Expects to be Discharged to[de-identified] Private residence Current DME Used/Available at Home: None EXAMINATION/PRESENTATION/DECISION MAKING:  
Critical Behavior: 
Neurologic State: Alert Orientation Level: Oriented X4 Cognition: Appropriate decision making, Follows commands Safety/Judgement: Awareness of environment, Insight into deficits Hearing: Auditory Auditory Impairment: None Skin:  Intact where exposed Edema: none noted Range Of Motion: 
AROM: Within functional limits Strength:   
Strength: Generally decreased, functional (slightly decr L /UE strength) Tone & Sensation:  
Tone: Normal 
Sensation: Intact Coordination: 
Coordination: Generally decreased, functional (impaired L FTN) Vision:  
Tracking:  (extra eye shifts with L and R end gaze) Saccades: Additional eye shifts occurred during testing Diplopia: No 
Functional Mobility: 
Bed Mobility: 
  
Supine to Sit: Supervision Transfers: 
Sit to Stand: Contact guard assistance Stand to Sit: Contact guard assistance Balance:  
Sitting: Intact Standing: Impaired; Without support Standing - Static: Good Standing - Dynamic : Fair Ambulation/Gait Training: 
Assistive Device: Gait belt Ambulation - Level of Assistance: Contact guard assistance;Minimal assistance Gait Description (WDL): Exceptions to [facility] Gait Abnormalities: Ataxic;Early heel rise;Trunk sway increased Base of Support: Narrowed Speed/Cynthia: Fluctuations Stair Training: 
Number of Stairs Trained: 8 Stairs - Level of Assistance: Contact guard assistance Rail Use: Right Functional Measure Angulo Balance Test: 
 
Sitting to Standin Standing Unsupported: 3 Sitting with Back Unsupported: 4 Standing to Sitting: 3 Transfers: 1 Standing Unsupported with Eyes Closed: 3 Standing Unsupported with Feet Together: 0 Reach Forward with Outstretched Arm: 3  Object: 3 Turn to Look Over Shoulders: 3 Turn 360 Degrees: 2 Alternate Foot on Step/Stool: 0 Standing Unsupported One Foot in Front: 2 Stand on One Leg: 3 Total: 31 
  
 
 
56=Maximum possible score;  
0-20=High fall risk 21-40=Moderate fall risk 41-56=Low fall risk Angulo Balance Test and G-code impairment scale: 
Percentage of Impairment CH 
 
0% 
 CI 
 
1-19% CJ 
 
20-39% CK 
 
40-59% CL 
 
60-79% CM 
 
80-99% CN  
 
100% Merari Zambrano Score 0-56 56 45-55 34-44 23-33 12-22 1-11 0  
 
 
G codes: In compliance with CMSs Claims Based Outcome Reporting, the following G-code set was chosen for this patient based on their primary functional limitation being treated:  
 
The outcome measure chosen to determine the severity of the functional limitation was the Alzo with a score of 31/56 which was correlated with the impairment scale. ? Mobility - Walking and Moving Around:  
  - CURRENT STATUS: CK - 40%-59% impaired, limited or restricted  - GOAL STATUS: CJ - 20%-39% impaired, limited or restricted  - D/C STATUS:  ---------------To be determined--------------- Physical Therapy Evaluation Charge Determination History Examination Presentation Decision-Making MEDIUM  Complexity : 1-2 comorbidities / personal factors will impact the outcome/ POC  MEDIUM Complexity : 3 Standardized tests and measures addressing body structure, function, activity limitation and / or participation in recreation  LOW Complexity : Stable, uncomplicated  Other outcome measures alvarez  MEDIUM Based on the above components, the patient evaluation is determined to be of the following complexity level: LOW Therapeutic Exercises:  
 
Pain: 
Pain Scale 1: Numeric (0 - 10) Pain Intensity 1: 0 Activity Tolerance:  
NAD Please refer to the flowsheet for vital signs taken during this treatment. After treatment:  
[x]     Patient left in no apparent distress sitting up in chair 
[]     Patient left in no apparent distress in bed 
[x]     Call bell left within reach [x]     Nursing notified 
[x]     Caregiver present 
[]     Bed alarm activated COMMUNICATION/EDUCATION:  
The patients plan of care was discussed with: Occupational Therapist and Registered Nurse, SLP Patient was educated regarding His deficit(s) of L sided ataxia as this relates to his diagnosis of possible CVA. He demonstrated Good understanding as evidenced by nodding. Patient and/or family was verbally educated on the BE FAST acronym for signs/symptoms of CVA and TIA. BE FAST was written on patient's communication board  for visual education and reinforcement. All questions answered with patient indicating good understanding.   
 
[x] Fall prevention education was provided and the patient/caregiver indicated understanding. [x]  Patient/family have participated as able in goal setting and plan of care. [x]  Patient/family agree to work toward stated goals and plan of care. []  Patient understands intent and goals of therapy, but is neutral about his/her participation. []  Patient is unable to participate in goal setting and plan of care. Thank you for this referral. 
Melvin Bacon, PT, DPT Time Calculation: 27 mins

## 2018-08-01 VITALS
SYSTOLIC BLOOD PRESSURE: 137 MMHG | HEIGHT: 69 IN | BODY MASS INDEX: 25.18 KG/M2 | WEIGHT: 169.97 LBS | RESPIRATION RATE: 15 BRPM | OXYGEN SATURATION: 98 % | DIASTOLIC BLOOD PRESSURE: 63 MMHG | TEMPERATURE: 97.6 F | HEART RATE: 54 BPM

## 2018-08-01 PROBLEM — R47.1 DYSARTHRIA: Status: RESOLVED | Noted: 2018-07-30 | Resolved: 2018-08-01
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
## 2018-08-01 PROBLEM — R27.0 ATAXIA: Status: RESOLVED | Noted: 2018-07-30 | Resolved: 2018-08-01

## 2018-08-01 LAB
ANION GAP SERPL CALC-SCNC: 6 MMOL/L (ref 5–15)
BASOPHILS # BLD: 0 K/UL (ref 0–0.1)
BASOPHILS NFR BLD: 0 % (ref 0–1)
BUN SERPL-MCNC: 19 MG/DL (ref 6–20)
BUN/CREAT SERPL: 20 (ref 12–20)
CALCIUM SERPL-MCNC: 8 MG/DL (ref 8.5–10.1)
CHLORIDE SERPL-SCNC: 110 MMOL/L (ref 97–108)
CO2 SERPL-SCNC: 26 MMOL/L (ref 21–32)
CREAT SERPL-MCNC: 0.93 MG/DL (ref 0.7–1.3)
DIFFERENTIAL METHOD BLD: ABNORMAL
EOSINOPHIL # BLD: 0.1 K/UL (ref 0–0.4)
EOSINOPHIL NFR BLD: 2 % (ref 0–7)
ERYTHROCYTE [DISTWIDTH] IN BLOOD BY AUTOMATED COUNT: 13.5 % (ref 11.5–14.5)
GLUCOSE SERPL-MCNC: 101 MG/DL (ref 65–100)
HCT VFR BLD AUTO: 37.7 % (ref 36.6–50.3)
HGB BLD-MCNC: 12.2 G/DL (ref 12.1–17)
IMM GRANULOCYTES # BLD: 0 K/UL (ref 0–0.04)
IMM GRANULOCYTES NFR BLD AUTO: 0 % (ref 0–0.5)
LYMPHOCYTES # BLD: 1.4 K/UL (ref 0.8–3.5)
LYMPHOCYTES NFR BLD: 27 % (ref 12–49)
MCH RBC QN AUTO: 29.9 PG (ref 26–34)
MCHC RBC AUTO-ENTMCNC: 32.4 G/DL (ref 30–36.5)
MCV RBC AUTO: 92.4 FL (ref 80–99)
MONOCYTES # BLD: 0.3 K/UL (ref 0–1)
MONOCYTES NFR BLD: 7 % (ref 5–13)
NEUTS SEG # BLD: 3.3 K/UL (ref 1.8–8)
NEUTS SEG NFR BLD: 64 % (ref 32–75)
NRBC # BLD: 0 K/UL (ref 0–0.01)
NRBC BLD-RTO: 0 PER 100 WBC
PLATELET # BLD AUTO: 215 K/UL (ref 150–400)
PMV BLD AUTO: 10.6 FL (ref 8.9–12.9)
POTASSIUM SERPL-SCNC: 3.9 MMOL/L (ref 3.5–5.1)
RBC # BLD AUTO: 4.08 M/UL (ref 4.1–5.7)
SODIUM SERPL-SCNC: 142 MMOL/L (ref 136–145)
WBC # BLD AUTO: 5.1 K/UL (ref 4.1–11.1)

## 2018-08-01 PROCEDURE — 74011250637 HC RX REV CODE- 250/637: Performed by: FAMILY MEDICINE

## 2018-08-01 PROCEDURE — 85025 COMPLETE CBC W/AUTO DIFF WBC: CPT | Performed by: INTERNAL MEDICINE

## 2018-08-01 PROCEDURE — 97116 GAIT TRAINING THERAPY: CPT

## 2018-08-01 PROCEDURE — 80048 BASIC METABOLIC PNL TOTAL CA: CPT | Performed by: INTERNAL MEDICINE

## 2018-08-01 PROCEDURE — 36415 COLL VENOUS BLD VENIPUNCTURE: CPT | Performed by: INTERNAL MEDICINE

## 2018-08-01 PROCEDURE — 74011250637 HC RX REV CODE- 250/637: Performed by: PSYCHIATRY & NEUROLOGY

## 2018-08-01 PROCEDURE — 94760 N-INVAS EAR/PLS OXIMETRY 1: CPT

## 2018-08-01 RX ORDER — CLOPIDOGREL BISULFATE 75 MG/1
75 TABLET ORAL DAILY
Qty: 30 TAB | Refills: 0 | Status: SHIPPED | OUTPATIENT
Start: 2018-08-02 | End: 2018-08-30 | Stop reason: SDUPTHER

## 2018-08-01 RX ORDER — CLOPIDOGREL BISULFATE 75 MG/1
75 TABLET ORAL DAILY
Status: DISCONTINUED | OUTPATIENT
Start: 2018-08-01 | End: 2018-08-01 | Stop reason: HOSPADM

## 2018-08-01 RX ORDER — SIMVASTATIN 40 MG/1
40 TABLET, FILM COATED ORAL DAILY
Status: DISCONTINUED | OUTPATIENT
Start: 2018-08-01 | End: 2018-08-01 | Stop reason: HOSPADM

## 2018-08-01 RX ADMIN — ASPIRIN 81 MG: 81 TABLET, DELAYED RELEASE ORAL at 08:45

## 2018-08-01 RX ADMIN — VITAMIN D, TAB 1000IU (100/BT) 1000 UNITS: 25 TAB at 08:45

## 2018-08-01 RX ADMIN — CLOPIDOGREL BISULFATE 75 MG: 75 TABLET ORAL at 12:11

## 2018-08-01 RX ADMIN — Medication 10 ML: at 06:30

## 2018-08-01 RX ADMIN — SIMVASTATIN 40 MG: 40 TABLET, FILM COATED ORAL at 12:11

## 2018-08-01 RX ADMIN — LATANOPROST 1 DROP: 50 SOLUTION OPHTHALMIC at 00:36

## 2018-08-01 NOTE — DISCHARGE SUMMARY
Discharge Summary       PATIENT ID: Rigo Renteria  MRN: [id number]   YOB: 1945    DATE OF ADMISSION: 7/30/2018  9:24 PM    DATE OF DISCHARGE: 8/1/2018  PRIMARY CARE PROVIDER: Corby Boone DO     DISCHARGING PROVIDER: Taj Hammonds MD    To contact this individual call [phone] and ask the  to page. If unavailable ask to be transferred the Adult Hospitalist Department. CONSULTATIONS: IP CONSULT TO HOSPITALIST  IP CONSULT TO TELE-NEUROLOGY  IP CONSULT TO NEUROLOGY    PROCEDURES/SURGERIES: * No surgery found *    ADMITTING DIAGNOSES & HOSPITAL COURSE:     74-year-old white male with a past medical history of degenerative arthritis, chronic knee pain, hyperlipidemia, glaucoma, presented to the emergency department from home accompanied by his wife with reported initial chief complaint of slurred speech. The patient notes the reason why he came to the ED was that his wife made him come. His wife notes the patient has not been acting right, noting that he was feeling \"intoxicated,\" feeling off balance, with ataxia, and she noticed slurred speech. MRI with \"Small to moderate-sized area of acute infarction genu right internal capsule/ventral thalamus. \" Planned to discharge on Plavix (in addition to his home aspirin and statin) with Neurology follow-up. DISCHARGE DIAGNOSES / PLAN:      Stroke - see above.  -started on Plavix  -Neuro follow-up    Ataxia, improved.  Home PT    HLD: Continue home aspirin and statin       PENDING TEST RESULTS:   At the time of discharge the following test results are still pending: None  FOLLOW UP APPOINTMENTS:    Follow-up Information     Follow up With Details Comments [address], [place] Schedule an appointment as soon as possible for a visit  [address] [facility] [address]  [facility]. [address], DO Schedule an appointment as soon as possible for a visit in  [facility] at [address] [facility] [facility] [address] [facility].  [phone]             ADDITIONAL CARE RECOMMENDATIONS:     DIET: Cardiac Diet    ACTIVITY: Activity as tolerated    DISCHARGE MEDICATIONS:  Current Discharge Medication List      START taking these medications    Details   clopidogrel (PLAVIX) 75 mg tab Take 1 Tab by mouth daily. Qty: 30 Tab, Refills: 0         CONTINUE these medications which have NOT CHANGED    Details   tadalafil (CIALIS) 2.5 mg tablet Take 1 Tab by mouth as needed for Other. Qty: 12 Tab, Refills: 3      simvastatin (ZOCOR) 40 mg tablet TAKE 1 TABLET BY MOUTH NIGHTLY  Qty: 90 Tab, Refills: 3      cholecalciferol (VITAMIN D3) 1,000 unit tablet Take  by mouth daily. aspirin delayed-release 81 mg tablet Take 81 mg by mouth daily. latanoprost (XALATAN) 0.005 % ophthalmic solution Administer 1 Drop to both eyes nightly. Dr. Sherita Hatfield taking these medications       naproxen sodium (ALEVE) 220 mg tablet Comments:   Reason for Stopping:               NOTIFY YOUR PHYSICIAN FOR ANY OF THE FOLLOWING:   Fever over 101 degrees for 24 hours. Chest pain, shortness of breath, fever, chills, nausea, vomiting, diarrhea, change in mentation, falling, weakness, bleeding. Severe pain or pain not relieved by medications. Or, any other signs or symptoms that you may have questions about.     DISPOSITION:    Home With:   OT  PT  HH  RN       Long term SNF/Inpatient Rehab    Independent/assisted living    Hospice    Other:       PATIENT CONDITION AT DISCHARGE:     Functional status    Poor     Deconditioned     Independent      Cognition     Lucid     Forgetful     Dementia      Catheters/lines (plus indication)    Kerr     PICC     PEG     None      Code status     Full code     DNR      PHYSICAL EXAMINATION AT DISCHARGE:    General: NAD, male  Cardiac: RRR, no murmur  Lungs: CTAB  Abdomen: soft, non-tender  Psych: normal affect  Neuro: alert and oriented x 4. Some mild intention tremor on L. Skin: No rash      CHRONIC MEDICAL DIAGNOSES:  Problem List as of 8/1/2018  Date Reviewed: 7/31/2018          Codes Class Noted - Resolved    Stroke [facility]) ICD-10-CM: I63.9  ICD-9-CM: 434.91  7/30/2018 - Present        Other male erectile dysfunction (Chronic) ICD-10-CM: N52.8  ICD-9-CM: 607.84  4/23/2018 - Present        Low vitamin D level (Chronic) ICD-10-CM: E55.9  ICD-9-CM: 268.9  4/23/2018 - Present        Dyslipidemia ICD-10-CM: E78.5  ICD-9-CM: 272.4  12/7/2009 - Present        Degenerative arthritis of knee ICD-10-CM: M17.10  ICD-9-CM: 715.36  12/7/2009 - Present        Glaucoma ICD-10-CM: H40.9  ICD-9-CM: 365.9  12/7/2009 - Present        Dermatitis,nonspecific ICD-10-CM: L30.9  ICD-9-CM: 692.9  12/7/2009 - Present        Conjunctivitis of left eye ICD-10-CM: H10.9  ICD-9-CM: 372.30  12/7/2009 - Present        * (Principal)RESOLVED: Ataxia ICD-10-CM: R27.0  ICD-9-CM: 781.3  7/30/2018 - 8/1/2018        RESOLVED: Dysarthria ICD-10-CM: R47.1  ICD-9-CM: 784.51  7/30/2018 - 8/1/2018        RESOLVED: Fall ICD-10-CM: [address] Monika Yuridia  ICD-9-CM: X460.9  7/30/2018 - 8/1/2018              Greater than 30 minutes were spent with the patient on counseling and coordination of care    Signed:   Odessa Cárdenas MD  8/1/2018  2:48 PM

## 2018-08-01 NOTE — PROGRESS NOTES
Spiritual Care Assessment/Progress Notes Howie Mcmillan [id number]  [id number]   
1945  81 y.o.  male Patient Telephone Number: [phone] (home) Latter day Affiliation: Laz Wong Language: [place] Extended Emergency Contact Information Primary Emergency Contact: Emiliano Prabhakar Home Phone: [phone] Mobile Phone: [phone] Relation: Spouse Patient Active Problem List  
 Diagnosis Date Noted  Ataxia 07/30/2018  Dysarthria 07/30/2018  Fall 07/30/2018  Stroke ([address] 07/30/2018 [place] Other male erectile dysfunction 04/23/2018  Low vitamin D level 04/23/2018  Dyslipidemia 12/07/2009  Degenerative arthritis of knee 12/07/2009  Glaucoma 12/07/2009  Dermatitis,nonspecific 12/07/2009  Conjunctivitis of left eye 12/07/2009 Date: 8/1/2018 Level of Latter day/Spiritual Activity: 
[x]         Involved in liam tradition/spiritual practice    []         Not involved in liam tradition/spiritual practice 
[]         Spiritually oriented    []         Claims no spiritual orientation    []         seeking spiritual identity []         Feels alienated from Denominational practice/tradition  []         Feels angry about Denominational practice/tradition 
[]         Spirituality/Denominational traditimedical condition Services Provided Today: 
[]         crisis intervention    []         reading Scriptures 
[]         spiritual assessment    []         Prayer for healing / Dorise Sellar [x]         empathic listening/emotional support  []         rites and rituals (cite in comments) [x]         life review     []         Denominational support 
[]         theological development   []         advocacy []         ethical dialog     []         Psalms for healing / Tehillim 
[]         bereavement support    []         support to family 
[]         anticipatory grief support   []         help with AMD 
[]         spiritual guidance    [] meditation Spiritual Care Needs 
[]         Emotional Support 
[]         Spiritual/Protestant Care 
[]         Loss/Adjustment 
[]         Advocacy/Referral Mckinley Callahan [x]         No needs expressed at this time 
[]         Other: (note in comments)  [address] 
[]         Follow up visits with pt/family []         Provide materials 
[]         Schedule sacraments 
[]         Contact [facility] [employer] 
[x]         Follow up as needed 
[]         Other: (note in comments) Comments: Patient was accompanied by wife. Patient is very involved in the [place] community. Patient is progressing and awaiting results. He's anxious to get back out there and return to life as normal.  We talked about the community and family. We know each other through the [place]. Indonesian prayer and words of comfort offered. Advised of continuous availability. Nothing further at this time. Misbah Manriquez, [facility], Protestant Provider

## 2018-08-01 NOTE — ROUTINE PROCESS
Bedside RN performed patient education and medication education. Discharge concerns initiated and discussed with patient and wife, including clarification on \"who\" assists the patient at their home and instructions for when the home going patient should call their provider after discharge. Opportunity for questions and clarification was provided. Patient receptive to education: YES Patient stated: \"\"Is there a better time to take my plavix and what happens if I forget to take it one day? \" 
Barriers to Education: none Diagnosis Education given:  YES Length of stay: 2 Expected Day of Discharge: 2 Ask if they have \"Help at Home\" & add to white board? YES Education Day #: 1,2 Medication Education Given:  YES 
M in the box Medication name: Plavix, aspirin Pt aware of HCAHPS survey: YES Stroke Education documented in Patient Education: YES Core Measures Documented in [facility]: 
Risk Factors: YES Warning signs of stroke: YES When to Activate 911: YES Medication Education for Risk Factors: YES Smoking cessation if applicable: YES Written Education Given:  Yee Croft Discharge NIH Completed: YES Score: 0 BRAINS: YES Follow Up Appointment Made: NO 
Date/Time if applicable: wife was calling provider to make appt.

## 2018-08-01 NOTE — PROGRESS NOTES
Problem: Mobility Impaired (Adult and Pediatric) Goal: *Acute Goals and Plan of Care (Insert Text) Physical Therapy Goals Initiated 7/31/2018 1. Patient will move from supine to sit and sit to supine  and scoot up and down in bed with independence within 7 day(s). 2.  Patient will transfer from bed to chair and chair to bed with independence using the least restrictive device within 7 day(s). 3.  Patient will perform sit to stand with independence within 7 day(s). 4.  Patient will ambulate with modified independence for 350 feet with the least restrictive device within 7 day(s). 5.  Patient will ascend/descend 12 stairs with R handrail(s) with modified independence within 7 day(s). 6.  Patient will improve Angulo Balance score by 7 points within 7 days. physical Therapy TREATMENT Patient: Petra Thomas (67 y.o. male) Date: 8/1/2018 Diagnosis: Altered mental status Ataxia Stroke [facility]) Fall Ataxia Precautions: Fall Chart, physical therapy assessment, plan of care and goals were reviewed. ASSESSMENT: 
Cleared for mobility by RN. Chart reviewed, note MRI positive for acute R internal capsule infarct. Pt and spouse do not appear to know findings, deferred to MD to provide results. Received pt upright in bed, affect appears improved compared to yesterday. Reassessed L UE FTN - remains dysmetric however improved compared to yesterday. Visual occular exam remains + for extra beats with bilateral, lateral end gaze however otherwise intact. Angulo reassessed (see below), improved from 31 to 52 indicating low falls risk. Gait training completed in hallway x450ft with integration of DGI components and dual task integration - no overt LOB, ataxia, or deviations noted with all. Participated in and cleared stair negotiation training without difficulty or safety concern. Pt and spouse declining IP rehab and prefer OP neuro based PT.  At this time, anticipate pt will be appropriate for discharge home with family assist and neuro based PT. Will continue to follow during acute admission. Progression toward goals: 
[x]       Improving appropriately and progressing toward goals 
[]       Improving slowly and progressing toward goals 
[]       Not making progress toward goals and plan of care will be adjusted PLAN: 
Patient continues to benefit from skilled intervention to address the above impairments. Continue treatment per established plan of care. Discharge Recommendations:  Outpatient Neuro based PT Further Equipment Recommendations for Discharge:  None SUBJECTIVE:  
Patient stated I feel better today.  OBJECTIVE DATA SUMMARY:  
Critical Behavior: 
Neurologic State: Alert Orientation Level: Oriented X4 Cognition: Appropriate decision making, Appropriate for age attention/concentration, Appropriate safety awareness, Follows commands Safety/Judgement: Awareness of environment, Decreased insight into deficits Functional Mobility Training: 
Bed Mobility: 
  
Supine to Sit: Modified independent Transfers: 
Sit to Stand: Supervision Stand to Sit: Supervision Balance: 
Sitting: Intact Standing: Impaired; Without support Standing - Static: Good Standing - Dynamic : Good Ambulation/Gait Training: 
Assistive Device: Gait belt Ambulation - Level of Assistance: Contact guard assistance;Supervision Gait Abnormalities: Early heel rise Base of Support: Narrowed Speed/Cynthia: Accelerated Stairs: 
Number of Stairs Trained: 8 Stairs - Level of Assistance: Stand-by assistance Rail Use: Right Pain: 
Pain Scale 1: Numeric (0 - 10) Pain Intensity 1: 0 Activity Tolerance:  
NAD Please refer to the flowsheet for vital signs taken during this treatment. After treatment:  
[x] Patient left in no apparent distress sitting up in chair 
[] Patient left in no apparent distress in bed 
[x] Call bell left within reach [x] Nursing notified 
[x] Caregiver present 
[] Bed alarm activated COMMUNICATION/EDUCATION:  
The patients plan of care was discussed with: Registered Nurse and . Patient was educated regarding His deficit(s) of L UE/LE ataxia as this relates to His diagnosis of possible CVA. He demonstrated Fair understanding as evidenced by nodding. Patient and/or family was verbally educated on the BE FAST acronym for signs/symptoms of CVA and TIA. BE FAST was written on patient's communication board  for visual education and reinforcement. All questions answered with patient indicating good understanding. [x]  Fall prevention education was provided and the patient/caregiver indicated understanding. [x]  Patient/family have participated as able in goal setting and plan of care. [x]  Patient/family agree to work toward stated goals and plan of care. []  Patient understands intent and goals of therapy, but is neutral about his/her participation. []  Patient is unable to participate in goal setting and plan of care. Thank you for this referral. 
Eve Kirk, PT , DPT Time Calculation: 28 mins

## 2018-08-01 NOTE — PROGRESS NOTES
The CM met with the patient and spouse at bedside in order to discuss the recommendation of inpatient rehab- the CM provided education on acute inpatient rehab, expectations and level of therapy, etc. The family stated that they do not know the patient's diagnosis as of yet, and would not like to make any decisions before knowing diagnosis, and before the patient receives further evaluation from PT. The family stated they would be open to the possibility of outpatient neuro- will await further PT evaluation- family is politely declining at this time for any referrals to be sent. MANUEL Fairbanks 
 
12:14 p.m.- Therapy now recommending outpatient neuro therapy- patient doing much better today. Attending MD to sign/write prescription. MANUEL Fairbanks CM met with patient and spouse at bedside- provided family with various locations of [place] neuro outpatient therapy as well as phone numbers. The family is very eager to discharge.

## 2018-08-01 NOTE — PROGRESS NOTES
Pharmacist Discharge Medication Reconciliation Discharging Provider: Dr. Jaki Antoine Significant PMH:  
Past Medical History:  
Diagnosis Date  Degenerative arthritis of knee 12/7/2009  Dermatitis,nonspecific 12/7/2009  Dyslipidemia 12/7/2009  Glaucoma 12/7/2009 Chief Complaint for this Admission: Chief Complaint Patient presents with  Dysarthria Allergies: Pravachol [pravastatin] and Sulfa (sulfonamide antibiotics) Discharge Medications:  
Current Discharge Medication List  
  
 
START taking these medications Details  
clopidogrel (PLAVIX) 75 mg tab Take 1 Tab by mouth daily. Qty: 30 Tab, Refills: 0 CONTINUE these medications which have NOT CHANGED Details  
tadalafil (CIALIS) 2.5 mg tablet Take 1 Tab by mouth as needed for Other. Qty: 12 Tab, Refills: 3  
  
simvastatin (ZOCOR) 40 mg tablet TAKE 1 TABLET BY MOUTH NIGHTLY Qty: 90 Tab, Refills: 3 cholecalciferol (VITAMIN D3) 1,000 unit tablet Take  by mouth daily. aspirin delayed-release 81 mg tablet Take 81 mg by mouth daily. latanoprost (XALATAN) 0.005 % ophthalmic solution Administer 1 Drop to both eyes nightly. Dr. Greyson Gonzalez STOP taking these medications  
  
 naproxen sodium (ALEVE) 220 mg tablet Comments:  
Reason for Stopping:   
   
  
 
 
The patient's chart, MAR and AVS were reviewed by Yanet Mcgee.

## 2018-08-01 NOTE — PROGRESS NOTES
Neurology Progress Note Patient ID: 
Lali Belle [id number] 
81 y.o. 
1945 Subjective:  
  
Patient without new complaints. He states he feels he is close to his baseline and doing much better today in terms of his gait instability/dizziness. MRI Brain reviewed showing acute stroke R thalamus. Current Facility-Administered Medications Medication Dose Route Frequency  clopidogrel (PLAVIX) tablet 75 mg  75 mg Oral DAILY  simvastatin (ZOCOR) tablet 40 mg  40 mg Oral DAILY  aspirin delayed-release tablet 81 mg  81 mg Oral DAILY  cholecalciferol (VITAMIN D3) tablet 1,000 Units  1,000 Units Oral DAILY  latanoprost (XALATAN) 0.005 % ophthalmic solution 1 Drop  1 Drop Both Eyes QHS  sodium chloride (NS) flush 5-10 mL  5-10 mL IntraVENous Q8H  
 sodium chloride (NS) flush 5-10 mL  5-10 mL IntraVENous PRN Objective:  
 
Patient Vitals for the past 8 hrs: 
 BP Temp Pulse Resp SpO2  
08/01/18 1100 149/77 97.6 °F (36.4 °C) 64 16 98 % 08/01/18 0623 113/57 97.4 °F (36.3 °C) (!) 52 18 98 % 08/01 0701 - 08/01 1900 In: 340 [P.O.:340] Out: -  
07/30 1901 - 08/01 0700 In: 440 [P.O.:440] Out: 150 [Urine:150] Lab Review Recent Results (from the past 24 hour(s)) CBC WITH AUTOMATED DIFF Collection Time: 08/01/18  3:13 AM  
Result Value Ref Range WBC 5.1 4.1 - 11.1 K/uL  
 RBC 4.08 (L) 4.10 - 5.70 M/uL  
 HGB 12.2 12.1 - 17.0 g/dL HCT 37.7 36.6 - 50.3 % MCV 92.4 80.0 - 99.0 FL  
 MCH 29.9 26.0 - 34.0 PG  
 MCHC 32.4 30.0 - 36.5 g/dL  
 RDW 13.5 11.5 - 14.5 % PLATELET 604 993 - 603 K/uL MPV 10.6 8.9 - 12.9 FL  
 NRBC 0.0 0  WBC ABSOLUTE NRBC 0.00 0.00 - 0.01 K/uL NEUTROPHILS 64 32 - 75 % LYMPHOCYTES 27 12 - 49 % MONOCYTES 7 5 - 13 % EOSINOPHILS 2 0 - 7 % BASOPHILS 0 0 - 1 % IMMATURE GRANULOCYTES 0 0.0 - 0.5 % ABS. NEUTROPHILS 3.3 1.8 - 8.0 K/UL  
 ABS. LYMPHOCYTES 1.4 0.8 - 3.5 K/UL  
 ABS. MONOCYTES 0.3 0.0 - 1.0 K/UL ABS. EOSINOPHILS 0.1 0.0 - 0.4 K/UL  
 ABS. BASOPHILS 0.0 0.0 - 0.1 K/UL  
 ABS. IMM. GRANS. 0.0 0.00 - 0.04 K/UL  
 DF AUTOMATED METABOLIC PANEL, BASIC Collection Time: 08/01/18  3:13 AM  
Result Value Ref Range Sodium 142 136 - 145 mmol/L Potassium 3.9 3.5 - 5.1 mmol/L Chloride 110 (H) 97 - 108 mmol/L  
 CO2 26 21 - 32 mmol/L Anion gap 6 5 - 15 mmol/L Glucose 101 (H) 65 - 100 mg/dL BUN 19 6 - 20 MG/DL Creatinine 0.93 0.70 - 1.30 MG/DL  
 BUN/Creatinine ratio 20 12 - 20 GFR est AA >60 >60 ml/min/1.73m2 GFR est non-AA >60 >60 ml/min/1.73m2 Calcium 8.0 (L) 8.5 - 10.1 MG/DL Neurological Exam: 
Mental Status: Alert and oriented to person place and time Speech: Fluent no aphasia, mild dysarthria Cranial Nerves:   Intact visual fields. Facial sensation is normal. Facial movement is symmetric. Palate is midline. Normal sternocleidomastoid strength. Tongue is midline. Hearing is intact bilaterally. Eyes: PERRL, EOM's full, no nystagmus, no ptosis. Motor:  Full and symmetric strength of upper and lower proximal and distal muscles. Normal bulk and tone. Reflexes:   Deep tendon reflexes 2+/4 and symmetrical.  Plantar response is downgoing b/l. Sensory:   Symmetrically intact  with no perceived deficits modalities involving small or large fibers. Gait:  Deferred Tremor:   No tremor noted. Cerebellar:  Slight possible ataxia LUE (improved), otherwise intact FTN/HTS Assessment:  
 
Principal Problem: 
  Ataxia (7/30/2018) Active Problems: 
  Dysarthria (7/30/2018) Fall (7/30/2018) Stroke [facility]) (7/30/2018) ·   
81year old [facility] h/o HPL, glaucoma presenting with dizziness, dysarthria, gait instability since 7/28/18. Evidence of gait instability and LUE ataxia on examination which has improved somewhat today.    
MRI brain independently reviewed showing evidence of a moderate acute R thalamic infarct possibly related to large artery atherosclerotic disease. MRA H/N with moderate to severe stenosis of the R PCA/P2 segment. TTE without shunt. Plan: ASA 81mg + Plavix 75mg for stroke prevention Cont. Statin therapy, goal LDL<70 SBP goal <140 Stroke education Dispo planning- outpatient PT. Likely home today Please arrange for Neuro F/U 4 weeks Signed: 
Blaine Russell DO 
8/1/2018 
2:07 PM

## 2018-08-01 NOTE — ROUTINE PROCESS
Bedside shift change report given to Roselyn Mcclellan  (oncoming nurse) by Mallika Perez (offgoing nurse).  Report included the following information SBAR, Kardex, Intake/Output, MAR, Accordion, Recent Results and Cardiac Rhythm NSR/ SB.

## 2018-08-01 NOTE — PROGRESS NOTES
Speech pathology Chart reviewed, spoke with PT, patient and his wife. Patient and wife feeling as though thinking/memory are back to baseline. Plan is for d/c home this afternoon and patient to go to OP PT. Discussed option for OP ST should patient get home, back to work and notice changes with ability to complete daily tasks. Patient and wife verbalized understanding.   
Gasper Beck M.S. CCC-SLP

## 2018-08-01 NOTE — DISCHARGE INSTRUCTIONS
Discharge Instructions       PATIENT ID: Leonie Salcido  MRN: [id number]   YOB: 1945    DATE OF ADMISSION: 7/30/2018  9:24 PM    DATE OF DISCHARGE: 8/1/2018    PRIMARY CARE PROVIDER: Danny Tripp III, DO     ATTENDING PHYSICIAN: Gabby Isabel MD  DISCHARGING PROVIDER: Gabby Isabel MD    To contact this individual call [phone] and ask the  to page. If unavailable ask to be transferred the Adult Hospitalist Department. DISCHARGE DIAGNOSES stroke    CONSULTATIONS: IP CONSULT TO HOSPITALIST  IP CONSULT TO TELE-NEUROLOGY  IP CONSULT TO NEUROLOGY    PROCEDURES/SURGERIES: * No surgery found *    PENDING TEST RESULTS:   At the time of discharge the following test results are still pending: None    FOLLOW UP APPOINTMENTS:   Follow-up Information     Follow up With Details Comments Contact Info    Danny Tripp III, DO Schedule an appointment as soon as possible for a visit  [address] [facility], DO Schedule an appointment as soon as possible for a visit in 4 weeks  [facility] at [address] [place] [address].  [phone]             ADDITIONAL CARE RECOMMENDATIONS:     You came in with some slurred speech and were found to have a stroke. You have improved and are almost back to your baseline. We have started you on Plavix and you should continue to take this medication. Please follow-up with Neurology in 4 weeks. DIET: Cardiac Diet    ACTIVITY: Activity as tolerated    DISCHARGE MEDICATIONS:   See Medication Reconciliation Form    · It is important that you take the medication exactly as they are prescribed. · Keep your medication in the bottles provided by the pharmacist and keep a list of the medication names, dosages, and times to be taken in your wallet. · Do not take other medications without consulting your doctor. NOTIFY YOUR PHYSICIAN FOR ANY OF THE FOLLOWING:   Fever over 101 degrees for 24 hours. Chest pain, shortness of breath, fever, chills, nausea, vomiting, diarrhea, change in mentation, falling, weakness, bleeding. Severe pain or pain not relieved by medications. Or, any other signs or symptoms that you may have questions about. DISPOSITION:    Home With:   OT  PT  NIKKY  RN       SNF/Inpatient Rehab/LTAC    Independent/assisted living    Hospice    Other:     Signed:   Britta Cohen MD  8/1/2018  2:45 PM  Stroke: Care Instructions  Your Care Instructions    You have had a stroke. This means that the blood flow to a part of your brain was blocked for some time, which damages the nerve cells in that part of the brain. The part of your body controlled by that part of your brain may not function properly now. The brain is an amazing organ that can heal itself to some degree. The stroke you had damaged part of your brain. But other parts of your brain may take over in some way for the damaged areas. You have already started this process. Your doctor will talk with you about what you can do to prevent another stroke. High blood pressure, high cholesterol, and diabetes are all risk factors for stroke. If you have any of these conditions, work with your doctor to make sure they are under control. Other risk factors for stroke include being overweight, smoking, and not getting regular exercise. Going home may be hard for you and your family. The more you can try to do for yourself, the better. Remember to take each day one at a time. Follow-up care is a key part of your treatment and safety. Be sure to make and go to all appointments, and call your doctor if you are having problems. It's also a good idea to know your test results and keep a list of the medicines you take. How can you care for yourself at home?    · Enter a stroke rehabilitation (rehab) program, if your doctor recommends it.  Physical, speech, and occupational therapies can help you manage bathing, dressing, eating, and other basics of daily living.     · Do not drive until your doctor says it is okay.     · It is normal to feel sad or depressed after a stroke. If these feelings last, talk to your doctor.     · If you are having problems with urine leakage, go to the bathroom at regular times, including when you first wake up and at bedtime. Also, limit fluids after dinner.     · If you are constipated, drink plenty of fluids, enough so that your urine is light yellow or clear like water. If you have kidney, heart, or liver disease and have to limit fluids, talk with your doctor before you increase the amount of fluids you drink. Set up a regular time for using the toilet. If you continue to have constipation, your doctor may suggest using a bulking agent, such as Metamucil, or a stool softener, laxative, or enema. Medicines    · Take your medicines exactly as prescribed. Call your doctor if you think you are having a problem with your medicine. You may be taking several medicines. ACE (angiotensin-converting enzyme) inhibitors, angiotensin II receptor blockers (ARBs), beta-blockers, diuretics (water pills), and calcium channel blockers control your blood pressure. Statins help lower cholesterol. Your doctor may also prescribe medicines for depression, pain, sleep problems, anxiety, or agitation.     · If your doctor has given you a blood thinner to prevent another stroke, be sure you get instructions about how to take your medicine safely. Blood thinners can cause serious bleeding problems.     · Do not take any over-the-counter medicines or herbal products without talking to your doctor first.     · If you take birth control pills or hormone therapy, talk to your doctor about whether they are right for you.    For family members and caregivers    · Make the home safe. Set up a room so that your loved one does not have to climb stairs.  Be sure the bathroom is on the same floor. Move throw rugs and furniture that could cause falls. Make sure that the lighting is good. Put grab bars and seats in tubs and showers.     · Find out what your loved one can do and what he or she needs help with. Try not to do things for your loved one that your loved one can do on his or her own. Help him or her learn and practice new skills.     · Visit and talk with your loved one often. Try doing activities together that you both enjoy, such as playing cards or board games. Keep in touch with your loved one's friends as much as you can. Encourage them to visit.     · Take care of yourself. Do not try to do everything yourself. Ask other family members to help. Eat well, get enough rest, and take time to do things that you enjoy. Keep up with your own doctor visits, and make sure to take your medicines regularly. Get out of the house as much as you can. Join a local support group. Find out if you qualify for home health care visits to help with rehab or for adult day care. When should you call for help? Call 911 anytime you think you may need emergency care. For example, call if:    · You have signs of another stroke. These may include:  ¨ Sudden numbness, tingling, weakness, or loss of movement in your face, arm, or leg, especially on only one side of your body. ¨ Sudden vision changes. ¨ Sudden trouble speaking. ¨ Sudden confusion or trouble understanding simple statements. ¨ Sudden problems with walking or balance. ¨ A sudden, severe headache that is different from past headaches. Call 911 even if these symptoms go away in a few minutes.    Call your doctor now or seek immediate medical care if:    · You have new symptoms that may be related to your stroke, such as falls or trouble swallowing.    Watch closely for changes in your health, and be sure to contact your doctor if you have any problems. Where can you learn more?   Go to http://marie-jose.info/. Enter Z926 in the search box to learn more about \"Stroke: Care Instructions. \"  Current as of: November 21, 2017  Content Version: 11.7  © 3223-4208 [employer]. Care instructions adapted under license by [employer] (which disclaims liability or warranty for this information). If you have questions about a medical condition or this instruction, always ask your healthcare professional. [place] [address] any warranty or liability for your use of this information.

## 2018-08-02 ENCOUNTER — TELEPHONE (OUTPATIENT)
Dept: INTERNAL MEDICINE CLINIC | Age: 73
End: 2018-08-02

## 2018-08-02 ENCOUNTER — PATIENT OUTREACH (OUTPATIENT)
Dept: INTERNAL MEDICINE CLINIC | Age: 73
End: 2018-08-02

## 2018-08-02 NOTE — TELEPHONE ENCOUNTER
Patient is returning call.  Does not know who called.  His number is [phone].        Message received & copied from [facility]

## 2018-08-02 NOTE — TELEPHONE ENCOUNTER
Patient is returning call.  Does not know who called.  His number is [phone].        Message received & copied from [facility]

## 2018-08-02 NOTE — PROGRESS NOTES
Hospital Discharge Follow-Up Date/Time:  2018 2:47 PM 
 
 
 
Top Challenges reviewed with the provider  
- reports scheduled to begin Outpatient Neuro Therapy on Monday, 18, at [facility] Method of communication with provider :chart routing Nurse Navigator (NN) contacted the patient by telephone to perform post hospital discharge assessment. Verified name and  with patient as identifiers. Provided introduction to self, and explanation of the Nurse Navigator role. Reviewed discharge instructions and red flags with patient who verbalized understanding. Patient given an opportunity to ask questions and does not have any further questions or concerns at this time. The patient agrees to contact the PCP office for questions related to their healthcare. NN provided contact information for future reference. Home Health orders at discharge: [address]: N/A Date of initial visit: N/A Durable Medical Equipment ordered/company: No 
Durable Medical Equipment received: N/A Barriers to care? None reported/identified at this time Advance Care Planning:  
Does patient have an Advance Directive:  reports that he does have a previously completed and up-to-date Advance Medical Directive . Requested that patient to provide a copy of AMD to PCP office. Medication(s):  
 
Medication reconciliation was performed with patient, who verbalizes understanding of administration of home medications. There were no barriers to obtaining medications identified at this time. Referral to Pharm D needed: no  
 
 
Med Rec during this Call Current Outpatient Prescriptions Medication Sig  clopidogrel (PLAVIX) 75 mg tab Take 1 Tab by mouth daily.  simvastatin (ZOCOR) 40 mg tablet TAKE 1 TABLET BY MOUTH NIGHTLY  tadalafil (CIALIS) 2.5 mg tablet Take 1 Tab by mouth as needed for Other.  cholecalciferol (VITAMIN D3) 1,000 unit tablet Take 1,000 Units by mouth daily.   
 aspirin delayed-release 81 mg tablet Take 81 mg by mouth daily.  latanoprost (XALATAN) 0.005 % ophthalmic solution Administer 1 Drop to both eyes nightly. Dr. García Rain No current facility-administered medications for this visit. There are no discontinued medications. [facility] follow up appointment(s): No future appointments. Non-[facility] follow up appointment(s): N/A [facility]:  scheduled Goals Chronic Disease  Impaired Physical Mobility related to Stroke 8/2/2018 
- patient reports Referral to Neuro Outpatient Therapy ordered at hospital discharge; reports initial appointment scheduled for 8/6/18 [facility] location 
- ambulating independently. Denies falls post-discharge. Denies concerns regarding gait instability; denies concerns regarding ambulating steps at home. Denies DME needs at this time. - IP SLP documentation reviewed-no recommendation for SLP at this time 
- reviewed signs/symptoms of stroke with patient - patient will begin Outpatient Therapy on 8/6/18 as recommended by Neurology;. Patient to contact NN/PCP office as needed for questions/concerns. Post Hospitalization  Attends follow-up appointments as directed. 8/2/2018 
- requested LPN  assistance with scheduling of EARLE appointment due to Provider appointment availability - provided patient with information for [address] and patient is agreeable to hospital follow-up visit by Kole Earl;  outreach to [facility] by telephone today in order to request hospital follow-up visit. Visit scheduled for 8/2/18 (post-discharge day 2) between 5p-6p; [facility] contacted patient and patient is agreeable. - patient to schedule appointment with [facility]; reports that he is waiting to receive a return phone call from office to schedule this appointment

## 2018-08-02 NOTE — PROGRESS NOTES
Hospital Discharge Follow-Up Date/Time:  8/2/2018 10:21 AM 
 
Patient was admitted to [facility] on 7/30/18 and discharged on 8/1/18 for stroke, dysarthria, fall, ataxia. The physician discharge summary was available at the time of outreach. Patient was contacted within 1 business days of discharge. Top Challenges reviewed with the provider  
-None at present 
- assess for home health PT versus Outpatient PT; recommended at discharge, however no order is noted Method of communication with provider :chart routing Inpatient RRAT score: 14 Was this a readmission? no  
Patient stated reason for the readmission: N/A Nurse Navigator (NN) attempted to contact the parent by telephone to perform post hospital discharge assessment; lvm requesting a return phone call to this NN Disease Specific:   CVA Summary of patient's top problems: 1. Stroke - started on Plavix, to f/u with Outpatient Neurology 2. Ataxia - to f/u with Home Health PT or Outpatient PT (unclear per discharge instructions, however no home health referral at discharge is noted) IP Consults: Hospitalist, Tele-Neurology, Neurology Procedures: None Home Health orders at discharge: None-Outpatient PT recommended Home Health company: N/A Date of initial visit: N/A Durable Medical Equipment ordered/company: No 
Durable Medical Equipment received: N/A Recommended OP Follow-Up: 
Dr. Jason Swift, DO - 4 weeks Barriers to care? unable to assess due to unable to reach patient Advance Care Planning:  
Does patient have an Advance Directive:  not on file Medication(s):  
New Medications at Discharge: plavix Changed Medications at Discharge: none Discontinued Medications at Discharge: Eileen Gleason [facility] follow up appointment(s): No future appointments. Non-[facility] follow up appointment(s): N/A [facility]:  unable to reach patient today Goals None

## 2018-08-03 ENCOUNTER — PATIENT OUTREACH (OUTPATIENT)
Dept: INTERNAL MEDICINE CLINIC | Age: 73
End: 2018-08-03

## 2018-08-03 ENCOUNTER — TELEPHONE (OUTPATIENT)
Dept: INTERNAL MEDICINE CLINIC | Age: 73
End: 2018-08-03

## 2018-08-03 ENCOUNTER — TELEPHONE (OUTPATIENT)
Dept: NEUROLOGY | Age: 73
End: 2018-08-03

## 2018-08-03 NOTE — TELEPHONE ENCOUNTER
Next available is 9/7. Okay to schedule then? Or with NP?      ----- Message from [facility] sent at 8/3/2018  9:29 AM EDT -----  Regarding: Dr. Haily Johnson  Pt spouse Sunita Yost [phone] or [phone] states the pt needs a EARLE for 4 weeks for d/c date of 8/1/18. Pt was in [facility] for a stroke.

## 2018-08-03 NOTE — PROGRESS NOTES
Goals Addressed Post Hospitalization  Attends follow-up appointments as directed. 8/2/2018 
- requested LPN  assistance with scheduling of EARLE appointment due to Provider appointment availability - provided patient with information for [address] and patient is agreeable to hospital follow-up visit by Kole Earl; NN outreach to Kole Earl by telephone today in order to request hospital follow-up visit. Visit scheduled for 8/2/18 (post-discharge day 2) between 5p-6p; [facility] contacted patient and patient is agreeable. - patient to schedule appointment with [facility]; reports that he is waiting to receive a return phone call from office to schedule this appointment 8/3/2018 
- patient scheduled for [facility] appointment with Dr. Ladarius Moon 8/6/18; patient notified of appointment information Future Appointments: 
Future Appointments Date Time Provider Temitope Moreau 8/6/2018 3:30 PM Douglas Louie Last Appointment My Department: 
4/23/2018

## 2018-08-06 ENCOUNTER — TELEPHONE (OUTPATIENT)
Dept: INTERNAL MEDICINE CLINIC | Age: 73
End: 2018-08-06

## 2018-08-06 NOTE — TELEPHONE ENCOUNTER
Spoke with Prince Pennington, patient's spouse on HIPPA. An appointment to see Dr. Jennifer Nava was offered for tomorrow (08/07/18) @ 9:30am, but due a previous meeting patient has, unable to schedule. Next appointment is today at 3:30pm or 08/16/18. Prince Pennington to call back if wants to schedule for today or will call back daily to check for any cancellations.

## 2018-08-06 NOTE — TELEPHONE ENCOUNTER
----- Message from Chanda Fire sent at 8/6/2018  9:26 AM EDT -----  Regarding: Dr Hillary Peters  Pt needs to reschedule his appt for F/U from [address] [place] [address] 8-1-2018 from a Stroke, pt canceled today at 3:30 pm, had another appt, please call pt's wife Kevin Madrid at [phone] or [phone], pt did make appt  for 8-, but would like something sooner.       Message copied/pasted from [facility]

## 2018-08-07 NOTE — TELEPHONE ENCOUNTER
Spoke with wife, patient is scheduled for follow up with  on 9/7. She would like a call back when speech therapy order has been faxed.

## 2018-08-08 ENCOUNTER — TELEPHONE (OUTPATIENT)
Dept: NEUROLOGY | Age: 73
End: 2018-08-08

## 2018-08-08 NOTE — TELEPHONE ENCOUNTER
Spoke with VishRigo, informed her order for [facility] was signed and faxed. She verbalized understanding.

## 2018-08-16 ENCOUNTER — OFFICE VISIT (OUTPATIENT)
Dept: INTERNAL MEDICINE CLINIC | Age: 73
End: 2018-08-16

## 2018-08-16 VITALS
RESPIRATION RATE: 16 BRPM | WEIGHT: 171 LBS | SYSTOLIC BLOOD PRESSURE: 136 MMHG | BODY MASS INDEX: 25.33 KG/M2 | TEMPERATURE: 97.7 F | OXYGEN SATURATION: 99 % | HEIGHT: 69 IN | DIASTOLIC BLOOD PRESSURE: 70 MMHG | HEART RATE: 57 BPM

## 2018-08-16 DIAGNOSIS — I63.9 CEREBROVASCULAR ACCIDENT (CVA), UNSPECIFIED MECHANISM (HCC): Primary | ICD-10-CM

## 2018-08-16 DIAGNOSIS — E78.5 DYSLIPIDEMIA: ICD-10-CM

## 2018-08-16 DIAGNOSIS — I10 ESSENTIAL HYPERTENSION: Chronic | ICD-10-CM

## 2018-08-16 DIAGNOSIS — G47.39 OTHER SLEEP APNEA: Chronic | ICD-10-CM

## 2018-08-16 DIAGNOSIS — M17.11 PRIMARY OSTEOARTHRITIS OF RIGHT KNEE: ICD-10-CM

## 2018-08-16 RX ORDER — LISINOPRIL 5 MG/1
5 TABLET ORAL DAILY
Qty: 30 TAB | Refills: 11 | Status: SHIPPED | OUTPATIENT
Start: 2018-08-16 | End: 2019-08-18 | Stop reason: SDUPTHER

## 2018-08-16 NOTE — PATIENT INSTRUCTIONS
Home Blood Pressure Test: About This Test  What is it? A home blood pressure test allows you to keep track of your blood pressure at home. Blood pressure is a measure of the force of blood against the walls of your arteries. Blood pressure readings include two numbers, such as 130/80 (say \"130 over 80\"). The first number is the systolic pressure. The second number is the diastolic pressure. Why is this test done? You may do this test at home to:  · Find out if you have high blood pressure. · Track your blood pressure if you have high blood pressure. · Track how well medicine is working to reduce high blood pressure. · Check how lifestyle changes, such as weight loss and exercise, are affecting blood pressure. How can you prepare for the test?  · Do not use caffeine, tobacco, or medicines known to raise blood pressure (such as nasal decongestant sprays) for at least 30 minutes before taking your blood pressure. · Do not exercise for at least 30 minutes before taking your blood pressure. What happens before the test?  Take your blood pressure while you feel comfortable and relaxed. Sit quietly with both feet on the floor for at least 5 minutes before the test.  What happens during the test?  · Sit with your arm slightly bent and resting on a table so that your upper arm is at the same level as your heart. · Roll up your sleeve or take off your shirt to expose your upper arm. · Wrap the blood pressure cuff around your upper arm so that the lower edge of the cuff is about 1 inch above the bend of your elbow. Proceed with the following steps depending on if you are using an automatic or manual pressure monitor. Automatic blood pressure monitors  · Press the on/off button on the automatic monitor and wait until the ready-to-measure \"heart\" symbol appears next to zero in the display window. · Press the start button. The cuff will inflate and deflate by itself.   · Your blood pressure numbers will appear on the screen. · Write your numbers in your log book, along with the date and time. Manual blood pressure monitors  · Place the earpieces of a stethoscope in your ears, and place the bell of the stethoscope over the artery, just below the cuff. · Close the valve on the rubber inflating bulb. · Squeeze the bulb rapidly with your opposite hand to inflate the cuff until the dial or column of mercury reads about 30 mm Hg higher than your usual systolic pressure. If you do not know your usual pressure, inflate the cuff to 210 mm Hg or until the pulse at your wrist disappears. · Open the pressure valve just slightly by twisting or pressing the valve on the bulb. · As you watch the pressure slowly fall, note the level on the dial at which you first start to hear a pulsing or tapping sound through the stethoscope. This is your systolic blood pressure. · Continue letting the air out slowly. The sounds will become muffled and will finally disappear. Note the pressure when the sounds completely disappear. This is your diastolic blood pressure. Let out all the remaining air. · Write your numbers in your log book, along with the date and time. What else should you know about the test?  Here are the categories of blood pressure for adults:  Ideal blood pressure. Systolic is less than 488, and diastolic is less than 80. Elevated blood pressure. Systolic is 227 to 658, and diastolic is less than 80. High blood pressure (hypertension). Systolic is 936 or above. Diastolic is 80 or above. One or both numbers may be high. It is more accurate to take the average of several readings made throughout the day than to rely on a single reading. Follow-up care is a key part of your treatment and safety. Be sure to make and go to all appointments, and call your doctor if you are having problems. It's also a good idea to keep a list of the medicines you take. Where can you learn more?   Go to http://marie-jose.info/. Enter C427 in the search box to learn more about \"Home Blood Pressure Test: About This Test.\"  Current as of: December 6, 2017  Content Version: 11.7  © 4057-6322 [employer]. Care instructions adapted under license by [employer] (which disclaims liability or warranty for this information). If you have questions about a medical condition or this instruction, always ask your healthcare professional. [address] any warranty or liability for your use of this information. DASH Diet: Care Instructions  Your Care Instructions    The DASH diet is an eating plan that can help lower your blood pressure. DASH stands for Dietary Approaches to Stop Hypertension. Hypertension is high blood pressure. The DASH diet focuses on eating foods that are high in calcium, potassium, and magnesium. These nutrients can lower blood pressure. The foods that are highest in these nutrients are fruits, vegetables, low-fat dairy products, nuts, seeds, and legumes. But taking calcium, potassium, and magnesium supplements instead of eating foods that are high in those nutrients does not have the same effect. The DASH diet also includes whole grains, fish, and poultry. The DASH diet is one of several lifestyle changes your doctor may recommend to lower your high blood pressure. Your doctor may also want you to decrease the amount of sodium in your diet. Lowering sodium while following the DASH diet can lower blood pressure even further than just the DASH diet alone. Follow-up care is a key part of your treatment and safety. Be sure to make and go to all appointments, and call your doctor if you are having problems. It's also a good idea to know your test results and keep a list of the medicines you take. How can you care for yourself at home? Following the DASH diet  · Eat 4 to 5 servings of fruit each day.  A serving is 1 medium-sized piece of fruit, ½ cup chopped or canned fruit, 1/4 cup dried fruit, or 4 ounces (½ cup) of fruit juice. Choose fruit more often than fruit juice. · Eat 4 to 5 servings of vegetables each day. A serving is 1 cup of lettuce or raw leafy vegetables, ½ cup of chopped or cooked vegetables, or 4 ounces (½ cup) of vegetable juice. Choose vegetables more often than vegetable juice. · Get 2 to 3 servings of low-fat and fat-free dairy each day. A serving is 8 ounces of milk, 1 cup of yogurt, or 1 ½ ounces of cheese. · Eat 6 to 8 servings of grains each day. A serving is 1 slice of bread, 1 ounce of dry cereal, or ½ cup of cooked rice, pasta, or cooked cereal. Try to choose whole-grain products as much as possible. · Limit lean meat, poultry, and fish to 2 servings each day. A serving is 3 ounces, about the size of a deck of cards. · Eat 4 to 5 servings of nuts, seeds, and legumes (cooked dried beans, lentils, and split peas) each week. A serving is 1/3 cup of nuts, 2 tablespoons of seeds, or ½ cup of cooked beans or peas. · Limit fats and oils to 2 to 3 servings each day. A serving is 1 teaspoon of vegetable oil or 2 tablespoons of salad dressing. · Limit sweets and added sugars to 5 servings or less a week. A serving is 1 tablespoon jelly or jam, ½ cup sorbet, or 1 cup of lemonade. · Eat less than 2,300 milligrams (mg) of sodium a day. If you limit your sodium to 1,500 mg a day, you can lower your blood pressure even more. Tips for success  · Start small. Do not try to make dramatic changes to your diet all at once. You might feel that you are missing out on your favorite foods and then be more likely to not follow the plan. Make small changes, and stick with them. Once those changes become habit, add a few more changes. · Try some of the following:  ¨ Make it a goal to eat a fruit or vegetable at every meal and at snacks. This will make it easy to get the recommended amount of fruits and vegetables each day.   ¨ Try yogurt topped with fruit and nuts for a snack or healthy dessert. ¨ Add lettuce, tomato, cucumber, and onion to sandwiches. ¨ Combine a ready-made pizza crust with low-fat mozzarella cheese and lots of vegetable toppings. Try using tomatoes, squash, spinach, broccoli, carrots, cauliflower, and onions. ¨ Have a variety of cut-up vegetables with a low-fat dip as an appetizer instead of chips and dip. ¨ Sprinkle sunflower seeds or chopped almonds over salads. Or try adding chopped walnuts or almonds to cooked vegetables. ¨ Try some vegetarian meals using beans and peas. Add garbanzo or kidney beans to salads. Make burritos and tacos with mashed rutledge beans or black beans. Where can you learn more? Go to http://marie-jose.info/. Enter I574 in the search box to learn more about \"DASH Diet: Care Instructions. \"  Current as of: December 6, 2017  Content Version: 11.7  © 1006-3593 [employer]. Care instructions adapted under license by [employer] (which disclaims liability or warranty for this information). If you have questions about a medical condition or this instruction, always ask your healthcare professional. [address] any warranty or liability for your use of this information.

## 2018-08-16 NOTE — PROGRESS NOTES
Reviewed record in preparation for visit and have obtained necessary documentation. Identified pt with two pt identifiers(name and ). Chief Complaint   Patient presents with   [facility] Follow Up       Health Maintenance Due   Topic Date Due    Influenza Age 5 to Adult  2018       Mr. Reggie Lu has a reminder for a \"due or due soon\" health maintenance. I have asked that he discuss health maintenance topic(s) due with His  primary care provider. Coordination of Care Questionnaire:  :     1) Have you been to an emergency room, urgent care clinic since your last visit? no   Hospitalized since your last visit? yes             2) Have you seen or consulted any other health care providers outside of [address] [place] [address] since your last visit? no  (Include any pap smears or colon screenings in this section.)    3) Do you have an Advance Directive on file? no    4) Are you interested in receiving information on Advance Directives? NO    Patient is accompanied by self I have received verbal consent from Faustina Michele to discuss any/all medical information while they are present in the room.

## 2018-08-16 NOTE — MR AVS SNAPSHOT
[address] [facility]. 
[phone] Patient: Bhaskar Grossman MRN:  [id number] Visit Information Date & Time Provider Department Dept. Phone Encounter #  
 8/16/2018 10:30 AM Samantha Hernandez, [address] [id number] Follow-up Instructions Return in about 3 months (around 11/16/2018). Your Appointments 9/7/2018 10:00 AM  
[facility] Appt Note: f/u appt from hosp - stroke - 8/6/2018 [facility] [address]  
[phone]  
  
   
 [address] [place] [address] Upcoming Health Maintenance Date Due Influenza Age 5 to Adult 8/1/2018 MEDICARE YEARLY EXAM 4/24/2019 GLAUCOMA SCREENING Q2Y 12/6/2019 DTaP/Tdap/Td series (2 - Td) 12/22/2021 COLONOSCOPY 8/19/2023 Allergies as of 8/16/2018  Review Complete On: 8/16/2018 By: Ana Vega III, DO Severity Noted Reaction Type Reactions Pravachol [Pravastatin]  11/23/2009    Rash  
 Sulfa (Sulfonamide Antibiotics)  11/23/2009    Other (comments) \"fever; bleeding\" Current Immunizations  Reviewed on 4/23/2018 Name Date Influenza Vaccine 10/7/2017, 10/4/2016, 9/15/2015, 11/24/2014 Influenza Vaccine Whole 9/1/2012 Pneumococcal Conjugate (PCV-13) 12/23/2014 Pneumococcal Polysaccharide (PPSV-23) 4/23/2018, 4/23/2018 TD Vaccine 12/7/2009 TDAP Vaccine 12/22/2011 10:15 AM  
 Zoster 12/22/2010 Not reviewed this visit You Were Diagnosed With   
  
 Codes Comments Cerebrovascular accident (CVA), unspecified mechanism ([facility] 75.)    -  Primary ICD-10-CM: I63.9 ICD-9-CM: 434.91 Essential hypertension     ICD-10-CM: I10 
ICD-9-CM: 401.9 Dyslipidemia     ICD-10-CM: E78.5 ICD-9-CM: 272.4 Other sleep apnea     ICD-10-CM: G47.39 
ICD-9-CM: 327.29 Primary osteoarthritis of right knee     ICD-10-CM: M17.11 ICD-9-CM: 715.16 Vitals BP Pulse Temp Resp Height(growth percentile) Weight(growth percentile) 136/70 (BP 1 Location: Right arm, BP Patient Position: Sitting) (!) 57 97.7 °F (36.5 °C) (Oral) 16 5' 9\" (1.753 m) 171 lb (77.6 kg) SpO2 BMI Smoking Status 99% 25.25 kg/m2 Never Smoker Vitals History BMI and BSA Data Body Mass Index Body Surface Area  
 25.25 kg/m 2 1.94 m 2 Preferred Pharmacy Pharmacy Name Phone [facility][id number]- [address] [phone] Your Updated Medication List  
  
   
This list is accurate as of 8/16/18 11:44 AM.  Always use your most recent med list.  
  
  
  
  
 aspirin delayed-release 81 mg tablet Take 81 mg by mouth daily. clopidogrel 75 mg Tab Commonly known as:  PLAVIX Take 1 Tab by mouth daily. lisinopril 5 mg tablet Commonly known as:  Luna Renee Take 1 Tab by mouth daily. simvastatin 40 mg tablet Commonly known as:  ZOCOR  
TAKE 1 TABLET BY MOUTH NIGHTLY  
  
 tadalafil 2.5 mg tablet Commonly known as:  CIALIS Take 1 Tab by mouth as needed for Other. VITAMIN D3 1,000 unit tablet Generic drug:  cholecalciferol Take 1,000 Units by mouth daily. XALATAN 0.005 % ophthalmic solution Generic drug:  latanoprost  
Administer 1 Drop to both eyes nightly. Dr. Mariano Sims Prescriptions Sent to Pharmacy Refills  
 lisinopril (PRINIVIL, ZESTRIL) 5 mg tablet 11 Sig: Take 1 Tab by mouth daily. Class: Normal  
 Pharmacy: [address] [place] [address] Ph #: [phone] Route: Oral  
  
We Performed the Following REFERRAL TO SLEEP STUDIES [REF99 Custom] Follow-up Instructions Return in about 3 months (around 11/16/2018). Referral Information Referral ID Referred By Referred To  
  
 Lara Oviedo MD   
   [address] [facility] [address] [place], [address] [facility] [address] Phone: [phone] Fax: [phone] Visits Status Start Date End Date 1 New Request 8/16/18 8/16/19 If your referral has a status of pending review or denied, additional information will be sent to support the outcome of this decision. Patient Instructions Home Blood Pressure Test: About This Test 
What is it? A home blood pressure test allows you to keep track of your blood pressure at home. Blood pressure is a measure of the force of blood against the walls of your arteries. Blood pressure readings include two numbers, such as 130/80 (say \"130 over 80\"). The first number is the systolic pressure. The second number is the diastolic pressure. Why is this test done? You may do this test at home to: · Find out if you have high blood pressure. · Track your blood pressure if you have high blood pressure. · Track how well medicine is working to reduce high blood pressure. · Check how lifestyle changes, such as weight loss and exercise, are affecting blood pressure. How can you prepare for the test? 
· Do not use caffeine, tobacco, or medicines known to raise blood pressure (such as nasal decongestant sprays) for at least 30 minutes before taking your blood pressure. · Do not exercise for at least 30 minutes before taking your blood pressure. What happens before the test? 
Take your blood pressure while you feel comfortable and relaxed. Sit quietly with both feet on the floor for at least 5 minutes before the test. 
What happens during the test? 
· Sit with your arm slightly bent and resting on a table so that your upper arm is at the same level as your heart. · Roll up your sleeve or take off your shirt to expose your upper arm. · Wrap the blood pressure cuff around your upper arm so that the lower edge of the cuff is about 1 inch above the bend of your elbow. Proceed with the following steps depending on if you are using an automatic or manual pressure monitor. Automatic blood pressure monitors · Press the on/off button on the automatic monitor and wait until the ready-to-measure \"heart\" symbol appears next to zero in the display window. · Press the start button. The cuff will inflate and deflate by itself. · Your blood pressure numbers will appear on the screen. · Write your numbers in your log book, along with the date and time. Manual blood pressure monitors · Place the earpieces of a stethoscope in your ears, and place the bell of the stethoscope over the artery, just below the cuff. · Close the valve on the rubber inflating bulb. · Squeeze the bulb rapidly with your opposite hand to inflate the cuff until the dial or column of mercury reads about 30 mm Hg higher than your usual systolic pressure. If you do not know your usual pressure, inflate the cuff to 210 mm Hg or until the pulse at your wrist disappears. · Open the pressure valve just slightly by twisting or pressing the valve on the bulb. · As you watch the pressure slowly fall, note the level on the dial at which you first start to hear a pulsing or tapping sound through the stethoscope. This is your systolic blood pressure. · Continue letting the air out slowly. The sounds will become muffled and will finally disappear. Note the pressure when the sounds completely disappear. This is your diastolic blood pressure. Let out all the remaining air. · Write your numbers in your log book, along with the date and time. What else should you know about the test? 
Here are the categories of blood pressure for adults: 
Ideal blood pressure. Systolic is less than 268, and diastolic is less than 80. Elevated blood pressure. Systolic is 735 to 098, and diastolic is less than 80. High blood pressure (hypertension). Systolic is 094 or above. Diastolic is 80 or above. One or both numbers may be high. It is more accurate to take the average of several readings made throughout the day than to rely on a single reading. Follow-up care is a key part of your treatment and safety. Be sure to make and go to all appointments, and call your doctor if you are having problems. It's also a good idea to keep a list of the medicines you take. Where can you learn more? Go to http://marie-jose.info/. Enter C427 in the search box to learn more about \"Home Blood Pressure Test: About This Test.\" Current as of: December 6, 2017 Content Version: 11.7 © 8804-2517 E96. Care instructions adapted under license by [employer] (which disclaims liability or warranty for this information). If you have questions about a medical condition or this instruction, always ask your healthcare professional. [place] [address] any warranty or liability for your use of this information. DASH Diet: Care Instructions Your Care Instructions The DASH diet is an eating plan that can help lower your blood pressure. DASH stands for Dietary Approaches to Stop Hypertension. Hypertension is high blood pressure. The DASH diet focuses on eating foods that are high in calcium, potassium, and magnesium. These nutrients can lower blood pressure. The foods that are highest in these nutrients are fruits, vegetables, low-fat dairy products, nuts, seeds, and legumes. But taking calcium, potassium, and magnesium supplements instead of eating foods that are high in those nutrients does not have the same effect. The DASH diet also includes whole grains, fish, and poultry. The DASH diet is one of several lifestyle changes your doctor may recommend to lower your high blood pressure.  Your doctor may also want you to decrease the amount of sodium in your diet. Lowering sodium while following the DASH diet can lower blood pressure even further than just the DASH diet alone. Follow-up care is a key part of your treatment and safety. Be sure to make and go to all appointments, and call your doctor if you are having problems. It's also a good idea to know your test results and keep a list of the medicines you take. How can you care for yourself at home? Following the DASH diet · Eat 4 to 5 servings of fruit each day. A serving is 1 medium-sized piece of fruit, ½ cup chopped or canned fruit, 1/4 cup dried fruit, or 4 ounces (½ cup) of fruit juice. Choose fruit more often than fruit juice. · Eat 4 to 5 servings of vegetables each day. A serving is 1 cup of lettuce or raw leafy vegetables, ½ cup of chopped or cooked vegetables, or 4 ounces (½ cup) of vegetable juice. Choose vegetables more often than vegetable juice. · Get 2 to 3 servings of low-fat and fat-free dairy each day. A serving is 8 ounces of milk, 1 cup of yogurt, or 1 ½ ounces of cheese. · Eat 6 to 8 servings of grains each day. A serving is 1 slice of bread, 1 ounce of dry cereal, or ½ cup of cooked rice, pasta, or cooked cereal. Try to choose whole-grain products as much as possible. · Limit lean meat, poultry, and fish to 2 servings each day. A serving is 3 ounces, about the size of a deck of cards. · Eat 4 to 5 servings of nuts, seeds, and legumes (cooked dried beans, lentils, and split peas) each week. A serving is 1/3 cup of nuts, 2 tablespoons of seeds, or ½ cup of cooked beans or peas. · Limit fats and oils to 2 to 3 servings each day. A serving is 1 teaspoon of vegetable oil or 2 tablespoons of salad dressing. · Limit sweets and added sugars to 5 servings or less a week. A serving is 1 tablespoon jelly or jam, ½ cup sorbet, or 1 cup of lemonade. · Eat less than 2,300 milligrams (mg) of sodium a day.  If you limit your sodium to 1,500 mg a day, you can lower your blood pressure even more. Tips for success · Start small. Do not try to make dramatic changes to your diet all at once. You might feel that you are missing out on your favorite foods and then be more likely to not follow the plan. Make small changes, and stick with them. Once those changes become habit, add a few more changes. · Try some of the following: ¨ Make it a goal to eat a fruit or vegetable at every meal and at snacks. This will make it easy to get the recommended amount of fruits and vegetables each day. ¨ Try yogurt topped with fruit and nuts for a snack or healthy dessert. ¨ Add lettuce, tomato, cucumber, and onion to sandwiches. ¨ Combine a ready-made pizza crust with low-fat mozzarella cheese and lots of vegetable toppings. Try using tomatoes, squash, spinach, broccoli, carrots, cauliflower, and onions. ¨ Have a variety of cut-up vegetables with a low-fat dip as an appetizer instead of chips and dip. ¨ Sprinkle sunflower seeds or chopped almonds over salads. Or try adding chopped walnuts or almonds to cooked vegetables. ¨ Try some vegetarian meals using beans and peas. Add garbanzo or kidney beans to salads. Make burritos and tacos with mashed rutledge beans or black beans. Where can you learn more? Go to http://marie-jose.info/. Enter R697 in the search box to learn more about \"DASH Diet: Care Instructions. \" Current as of: December 6, 2017 Content Version: 11.7 © 8969-6743 [employer]. Care instructions adapted under license by [employer] (which disclaims liability or warranty for this information). If you have questions about a medical condition or this instruction, always ask your healthcare professional. [address]  any warranty or liability for your use of this information. Introducing [facility]! Paulie Colon introduces Rezdy patient portal. Now you can access parts of your medical record, email your doctor's office, and request medication refills online. 1. In your internet browser, go to [email or web address] 2. Click on the First Time User? Click Here link in the Sign In box. You will see the New Member Sign Up page. 3. Enter your Rezdy Access Code exactly as it appears below. You will not need to use this code after youve completed the sign-up process. If you do not sign up before the expiration date, you must request a new code. · Rezdy Access Code: [email or web address] Expires: 10/28/2018  8:27 PM 
 
4. Enter the last four digits of your Social Security Number (xxxx) and Date of Birth (mm/dd/yyyy) as indicated and click Submit. You will be taken to the next sign-up page. 5. Create a Rezdy ID. This will be your Rezdy login ID and cannot be changed, so think of one that is secure and easy to remember. 6. Create a Rezdy password. You can change your password at any time. 7. Enter your Password Reset Question and Answer. This can be used at a later time if you forget your password. 8. Enter your e-mail address. You will receive e-mail notification when new information is available in [address] 9. Click Sign Up. You can now view and download portions of your medical record. 10. Click the Download Summary menu link to download a portable copy of your medical information. If you have questions, please visit the Frequently Asked Questions section of the Rezdy website. Remember, Rezdy is NOT to be used for urgent needs. For medical emergencies, dial 911. Now available from your iPhone and Android! Please provide this summary of care documentation to your next provider. Your primary care clinician is listed as Jovon Garcia If you have any questions after today's visit, please call [phone].

## 2018-08-16 NOTE — PROGRESS NOTES
Hugo Gallardo is a 81 y.o. male who presents for evaluation of hospital follow up. Last seen by me April 23, 2018 in new pt visit. Was inpt stm from July 30-aug 1 with acute cva. Still working with pt,ot, and st.  Making good progress. Wife with him today. ROS:  Constitutional: negative for fevers, chills, anorexia and weight loss  Eyes:   negative for visual disturbance and irritation  ENT:   negative for tinnitus,sore throat,nasal congestion,ear pain,hoarseness  Respiratory:  negative for cough, hemoptysis, dyspnea,wheezing  CV:   negative for chest pain, palpitations, lower extremity edema  GI:   negative for nausea, vomiting, diarrhea, abdominal pain,melena  Genitourinary: negative for frequency, dysuria and hematuria  Musculoskel: negative for myalgias, arthralgias, back pain, muscle weakness, joint pain  Neurological:  negative for headaches, dizziness, focal weakness, numbness  Psychiatric:     Negative for depression or anxiety      Past Medical History:   Diagnosis Date    Degenerative arthritis of knee 12/7/2009    Dermatitis,nonspecific 12/7/2009    Dyslipidemia 12/7/2009    Glaucoma 12/7/2009    Stroke ([facility] [address]        Past Surgical History:   Procedure Laterality Date    HX SKIN BIOPSY  04/15    on forehead, pre-melanoma       Family History   Problem Relation Age of Onset    Cancer Mother     Heart Disease Father        Social History     Social History    Marital status:      Spouse name: N/A    Number of children: N/A    Years of education: N/A     Occupational History    Not on file.      Social History Main Topics    Smoking status: Never Smoker    Smokeless tobacco: Never Used    Alcohol use Yes      Comment: not to excess    Drug use: No    Sexual activity: Yes     Partners: Female     Other Topics Concern    Not on file     Social History Narrative            Visit Vitals    /70 (BP 1 Location: Right arm, BP Patient Position: Sitting)    Pulse (!) 57  Temp 97.7 °F (36.5 °C) (Oral)    Resp 16    Ht 5' 9\" (1.753 m)    Wt 171 lb (77.6 kg)    SpO2 99%    BMI 25.25 kg/m2       Physical Examination:   General - Well appearing male  HEENT - PERRL, TM no erythema/opacification, normal nasal turbinates, no oropharyngeal erythema or exudate, MMM  Neck - supple, no bruits, no thyroidomegaly, no lymphadenopathy  Pulm - clear to auscultation bilaterally  Cardio - RRR, normal S1 S2, no murmur  Abd - soft, nontender, no masses, no HSM  Extrem - no edema, +2 distal pulses  Neuro-  No focal deficits, CN intact     Assessment/Plan:    1. Acute cva with mild left sided weakness--on asa and plavix now. Handicap form filled out for 6 months  2.  htn--start lisinopril  3.  hyperlipids--on zocor, LDL 72  4. Right knee oa--follows with ortho, hopes to have knee replacement in future  5.  rodney--referral to sleep, dr Anatoliy Encarnacion    rtc 3 months, follow bp at home as well.         Mikayla Jeffers III, DO

## 2018-08-29 ENCOUNTER — TELEPHONE (OUTPATIENT)
Dept: NEUROLOGY | Age: 73
End: 2018-08-29

## 2018-08-30 NOTE — TELEPHONE ENCOUNTER
Requested Prescriptions     Pending Prescriptions Disp Refills    clopidogrel (PLAVIX) 75 mg tab 30 Tab 0     Sig: Take 1 Tab by mouth daily.      Pt only has 2 pills left     Please call pt when called into pharmacy

## 2018-08-31 RX ORDER — CLOPIDOGREL BISULFATE 75 MG/1
75 TABLET ORAL DAILY
Qty: 30 TAB | Refills: 0 | OUTPATIENT
Start: 2018-08-31

## 2018-09-06 ENCOUNTER — OFFICE VISIT (OUTPATIENT)
Dept: NEUROLOGY | Age: 73
End: 2018-09-06

## 2018-09-06 VITALS
HEIGHT: 69 IN | BODY MASS INDEX: 25.77 KG/M2 | OXYGEN SATURATION: 98 % | RESPIRATION RATE: 18 BRPM | HEART RATE: 72 BPM | SYSTOLIC BLOOD PRESSURE: 112 MMHG | WEIGHT: 174 LBS | DIASTOLIC BLOOD PRESSURE: 68 MMHG

## 2018-09-06 DIAGNOSIS — Z86.73 H/O: STROKE: Primary | ICD-10-CM

## 2018-09-06 DIAGNOSIS — I67.9 INTRACRANIAL VASCULAR STENOSIS: ICD-10-CM

## 2018-09-06 DIAGNOSIS — I10 ESSENTIAL HYPERTENSION: Chronic | ICD-10-CM

## 2018-09-06 DIAGNOSIS — E78.5 DYSLIPIDEMIA: ICD-10-CM

## 2018-09-06 NOTE — LETTER
9/6/2018 10:21 AM 
 
Mr. Kel Hogan [address] [facility] [address] [phone] To Whom It May Concern: 
 
Kel Hogan is currently under the care of [address] [facility]. We have reviewed records from occupational therapy, agree with occupational therapist that it is okay for him to resume driving. If there are questions or concerns please have the patient contact our office. Sincerely, Mychal Bhardwaj NP

## 2018-09-06 NOTE — PROGRESS NOTES
Date:            18     Name:  Harvis Meckel  :    MRN:  [id number]     PCP:  Dot Trejo DO    Chief Complaint   Patient presents with   [facility] Follow Up     fu stroke         HISTORY OF PRESENT ILLNESS:  Gabo Mckenna is a 81 y.o., male who presents today for follow up for stroke. He  presented to the hospital with dizziness, dysarthria, gait instability that started 2 days prior to his ER visit. MRI showed \"small to moderate-sized area of acute infarction genu right internal capsule/ventral thalamus, no flow limiting stenosis or arterial occlusion. Moderate to severe stenosis right posterior cerebral artery\". He was on aspirin, Plavix was added at discharge. He reports that he was feeling dizzy, speech was slurred. Not sure exactly when symptoms started, he though that it might be a reaction to a cortisone shot in his knee because his face also turned bright red shortly after the shot, was very tired and he slept all day. This started Saturday, he felt a little better  and Monday he went to work and came home before daughter noticed speech was slurred, this was when he presented to the ER. He feels that he has recovered very well. He just finished OT, PT and speech. He was released from speech early on. Gait is fine, no balance issues, no falling. He has a little trouble with handwriting, has exercises for that. No other residual that he or his wife have noticed. He has been cleared to drive by OT, wife has been riding with him and is comfortable. He has to have knee replacement at some point. A1c 5.6  LDL 72.2  BP at admission 132/75  Carotid dopplers   TTE: Left ventricle: Systolic function was normal. Ejection fraction was  estimated in the range of 60 % to 65 %. There were no regional wall motion  abnormalities. Atrial septum: There was no left-to-right shunt and no right-to-left  shunt.   Aortic valve: Leaflets exhibited moderately increased thickness and  moderately reduced cuspal separation. 7.30.2018 neuro consult  81year old LHM h/o HPL, glaucoma presenting with dizziness, dysarthria, gait instability since 7/28/18. Evidence of gait instability and LUE ataxia on examination today concerning for posterior circulation infarct. Evidence of R IC hypodensity on head CT from admission. Current Outpatient Prescriptions   Medication Sig    clopidogrel (PLAVIX) 75 mg tab TAKE 1 TABLET BY MOUTH EVERY DAY    lisinopril (PRINIVIL, ZESTRIL) 5 mg tablet Take 1 Tab by mouth daily.  simvastatin (ZOCOR) 40 mg tablet TAKE 1 TABLET BY MOUTH NIGHTLY    cholecalciferol (VITAMIN D3) 1,000 unit tablet Take 1,000 Units by mouth daily.  aspirin delayed-release 81 mg tablet Take 81 mg by mouth daily.  latanoprost (XALATAN) 0.005 % ophthalmic solution Administer 1 Drop to both eyes nightly. Dr. Berlin Lesch     tadalafil (CIALIS) 2.5 mg tablet Take 1 Tab by mouth as needed for Other. No current facility-administered medications for this visit. Allergies   Allergen Reactions    Pravachol [Pravastatin] Rash    Sulfa (Sulfonamide Antibiotics) Other (comments)     \"fever; bleeding\"     Past Medical History:   Diagnosis Date    Degenerative arthritis of knee 12/7/2009    Dermatitis,nonspecific 12/7/2009    Dyslipidemia 12/7/2009    Glaucoma 12/7/2009    Stroke ([facility] [address]      Past Surgical History:   Procedure Laterality Date    HX SKIN BIOPSY  04/15    on forehead, pre-melanoma     Social History     Social History    Marital status:      Spouse name: N/A    Number of children: N/A    Years of education: N/A     Occupational History    Not on file.      Social History Main Topics    Smoking status: Never Smoker    Smokeless tobacco: Never Used    Alcohol use Yes      Comment: not to excess    Drug use: No    Sexual activity: Yes     Partners: Female     Other Topics Concern    Not on file     Social History Narrative     Family History Problem Relation Age of Onset    Cancer Mother     Heart Disease Father          PHYSICAL EXAMINATION:    Visit Vitals    /68    Pulse 72    Resp 18    Ht 5' 9\" (1.753 m)    Wt 78.9 kg (174 lb)    SpO2 98%    BMI 25.7 kg/m2     General:  Well defined, nourished, and groomed individual in no acute distress. Neck: Supple, nontender, no bruits, no pain with resistance to active range of motion. Heart: Regular rate and rhythm, no murmurs, rub, or gallop. Normal S1S2. Lungs:  Clear to auscultation bilaterally with equal chest expansion, no cough, no wheeze  Musculoskeletal:  Extremities revealed no edema and had full range of motion of joints. Psych:  Good mood and bright affect    NEUROLOGICAL EXAMINATION:     Mental Status:   Alert and oriented to person, place, and time with recent and remote memory intact. Attention span and concentration are normal. Speech is fluent with a full fund of knowledge. Cranial Nerves:    II, III, IV, VI:  Visual acuity grossly intact. Pupils are equal, round, and reactive to light. Extra-ocular movements are full and fluid. No ptosis or nystagmus. V-XII: Hearing is grossly intact. Facial features are symmetric, with normal sensation and strength. The palate rises symmetrically and the tongue protrudes midline. Sternocleidomastoids 5/5. Motor Examination: Normal tone, bulk, and strength, 5/5 muscle strength throughout. Coordination:  Finger to nose testing was normal.   No resting or intention tremor  Gait and Station:  Steady while walking. Normal arm swing. No pronator drift. No muscle wasting or fasciculations noted. ASSESSMENT AND PLAN    ICD-10-CM ICD-9-CM    1. H/O: stroke Z86.73 V12.54    2. Dyslipidemia E78.5 272.4    3. Intracranial vascular stenosis I67.9 437.9    4. Essential hypertension I10 36.9      27-year-old male seen in follow-up for stroke.   He presented to the hospital with dizziness, dysarthria, gait instability ×2 days. MRI showed small to moderate acute right internal capsule/thalamic infarct. MRA showed moderate to severe right PCA stenosis. Has returned to baseline aside from some difficulty with handwriting. He was cleared by OT to resume driving, his wife has ridden with him in a car and does not have any concerns. He is considering having knee replacement sometime soon, not right away. 1. LDL 72.2 at admission, near goal of <70 for secondary stroke prevention. On 40 mg simvastatin, continue to monitor with PCP  2. Hgb A1C 5.6, continue to monitor with PCP as the development of diabetes would put him at high risk for secondary stroke  3. Continue close monitoring of HTN with PCP  4. Continue aspirin 81 mg and Plavix 75 mg for stroke prevention in light of moderate severe right PCA stenosis  5. Discussed BE-FAST s/s of stroke and the need to call 911 immediately at the onset of symptoms for rapid ER evaluation  6. Repeat carotid doppler in one year  7. Would advise him to wait at least 6 months before considering knee replacement, also discussed that he would need to stop his antiplatelet medications for surgery which would put him at high risk of stroke  8. Cleared to resume driving, patient or wife will notify the clinic if they have any concerns about his driving we will order driving similar evaluation sheltering arms      Follow-up in 6 months, call sooner with concerns    Joaquin Huang NP    This note was created using voice recognition software. Despite editing, there may be syntax errors.

## 2018-09-06 NOTE — PATIENT INSTRUCTIONS

## 2018-09-06 NOTE — MR AVS SNAPSHOT
[facility] [address] [place] [address] 
[phone] Patient: Megan Diaz MRN:  [id number] Visit Information Date & Time Provider Department Dept. Phone Encounter #  
 9/6/2018  9:30 AM Macario Tripp NP [facility] at [address] [place] [address] Road [id number] Your Appointments 11/8/2018  9:30 AM  
PHYSICAL PRE OP with Louise Ng III, DO [facility]) Appt Note: 3 month f/u  
 [address]  
  
   
 [address]  [address]  
  
    
 3/7/2019 10:40 AM  
Follow Up with Isadora Posada DO [facility] at [facility] Appt Note: 6 month f/u NN 9/6/18  
 [address] [place] [id number]  
[phone]  
  
   
 [address] [place] [id number] Upcoming Health Maintenance Date Due Influenza Age 5 to Adult 8/1/2018 MEDICARE YEARLY EXAM 4/24/2019 GLAUCOMA SCREENING Q2Y 12/6/2019 DTaP/Tdap/Td series (2 - Td) 12/22/2021 COLONOSCOPY 8/19/2023 Allergies as of 9/6/2018  Review Complete On: 9/6/2018 By: Sarah Ovalles LPN Severity Noted Reaction Type Reactions Pravachol [Pravastatin]  11/23/2009    Rash  
 Sulfa (Sulfonamide Antibiotics)  11/23/2009    Other (comments) \"fever; bleeding\" Current Immunizations  Reviewed on 4/23/2018 Name Date Influenza Vaccine 10/7/2017, 10/4/2016, 9/15/2015, 11/24/2014 Influenza Vaccine Whole 9/1/2012 Pneumococcal Conjugate (PCV-13) 12/23/2014 Pneumococcal Polysaccharide (PPSV-23) 4/23/2018, 4/23/2018 TD Vaccine 12/7/2009 TDAP Vaccine 12/22/2011 10:15 AM  
 Zoster 12/22/2010 Not reviewed this visit Vitals BP Pulse Resp Height(growth percentile) Weight(growth percentile) SpO2  
 112/68 72 18 5' 9\" (1.753 m) 174 lb (78.9 kg) 98% BMI Smoking Status 25.7 kg/m2 Never Smoker Vitals History BMI and BSA Data Body Mass Index Body Surface Area 25.7 kg/m 2 1.96 m 2 Preferred Pharmacy Pharmacy Name Phone [id number] [phone] [facility] [address] [phone] Your Updated Medication List  
  
   
This list is accurate as of 9/6/18 10:23 AM.  Always use your most recent med list.  
  
  
  
  
 aspirin delayed-release 81 mg tablet Take 81 mg by mouth daily. clopidogrel 75 mg Tab Commonly known as:  PLAVIX TAKE 1 TABLET BY MOUTH EVERY DAY  
  
 lisinopril 5 mg tablet Commonly known as:  Ramon Bridges Take 1 Tab by mouth daily. simvastatin 40 mg tablet Commonly known as:  ZOCOR  
TAKE 1 TABLET BY MOUTH NIGHTLY  
  
 tadalafil 2.5 mg tablet Commonly known as:  CIALIS Take 1 Tab by mouth as needed for Other. VITAMIN D3 1,000 unit tablet Generic drug:  cholecalciferol Take 1,000 Units by mouth daily. XALATAN 0.005 % ophthalmic solution Generic drug:  latanoprost  
Administer 1 Drop to both eyes nightly. Dr. Tj Christopher Patient Instructions A Healthy Lifestyle: Care Instructions Your Care Instructions A healthy lifestyle can help you feel good, stay at a healthy weight, and have plenty of energy for both work and play. A healthy lifestyle is something you can share with your whole family. A healthy lifestyle also can lower your risk for serious health problems, such as high blood pressure, heart disease, and diabetes. You can follow a few steps listed below to improve your health and the health of your family. Follow-up care is a key part of your treatment and safety.  Be sure to make and go to all appointments, and call your doctor if you are having problems. It's also a good idea to know your test results and keep a list of the medicines you take. How can you care for yourself at home? · Do not eat too much sugar, fat, or fast foods. You can still have dessert and treats now and then. The goal is moderation. · Start small to improve your eating habits. Pay attention to portion sizes, drink less juice and soda pop, and eat more fruits and vegetables. ¨ Eat a healthy amount of food. A 3-ounce serving of meat, for example, is about the size of a deck of cards. Fill the rest of your plate with vegetables and whole grains. ¨ Limit the amount of soda and sports drinks you have every day. Drink more water when you are thirsty. ¨ Eat at least 5 servings of fruits and vegetables every day. It may seem like a lot, but it is not hard to reach this goal. A serving or helping is 1 piece of fruit, 1 cup of vegetables, or 2 cups of leafy, raw vegetables. Have an apple or some carrot sticks as an afternoon snack instead of a candy bar. Try to have fruits and/or vegetables at every meal. 
· Make exercise part of your daily routine. You may want to start with simple activities, such as walking, bicycling, or slow swimming. Try to be active 30 to 60 minutes every day. You do not need to do all 30 to 60 minutes all at once. For example, you can exercise 3 times a day for 10 or 20 minutes. Moderate exercise is safe for most people, but it is always a good idea to talk to your doctor before starting an exercise program. 
· Keep moving. Ceci Bun the lawn, work in the garden, or Kymab. Take the stairs instead of the elevator at work. · If you smoke, quit. People who smoke have an increased risk for heart attack, stroke, cancer, and other lung illnesses. Quitting is hard, but there are ways to boost your chance of quitting tobacco for good. ¨ Use nicotine gum, patches, or lozenges. ¨ Ask your doctor about stop-smoking programs and medicines. ¨ Keep trying. In addition to reducing your risk of diseases in the future, you will notice some benefits soon after you stop using tobacco. If you have shortness of breath or asthma symptoms, they will likely get better within a few weeks after you quit. · Limit how much alcohol you drink. Moderate amounts of alcohol (up to 2 drinks a day for men, 1 drink a day for women) are okay. But drinking too much can lead to liver problems, high blood pressure, and other health problems. Family health If you have a family, there are many things you can do together to improve your health. · Eat meals together as a family as often as possible. · Eat healthy foods. This includes fruits, vegetables, lean meats and dairy, and whole grains. · Include your family in your fitness plan. Most people think of activities such as jogging or tennis as the way to fitness, but there are many ways you and your family can be more active. Anything that makes you breathe hard and gets your heart pumping is exercise. Here are some tips: 
¨ Walk to do errands or to take your child to school or the bus. ¨ Go for a family bike ride after dinner instead of watching TV. Where can you learn more? Go to http://marieEdvertjose.info/. Enter W531 in the search box to learn more about \"A Healthy Lifestyle: Care Instructions. \" Current as of: December 7, 2017 Content Version: 11.7 © 0336-5021 [employer]. Care instructions adapted under license by [employer] (which disclaims liability or warranty for this information). If you have questions about a medical condition or this instruction, always ask your healthcare professional. [place] [address] any warranty or liability for your use of this information. Introducing [facility]! Paulie Colon introduces TrabajoPanel patient portal. Now you can access parts of your medical record, email your doctor's office, and request medication refills online. 1. In your internet browser, go to [email or web address] 2. Click on the First Time User? Click Here link in the Sign In box. You will see the New Member Sign Up page. 3. Enter your Stylr Access Code exactly as it appears below. You will not need to use this code after youve completed the sign-up process. If you do not sign up before the expiration date, you must request a new code. · Stylr Access Code: [email or web address] Expires: 10/28/2018  8:27 PM 
 
4. Enter the last four digits of your Social Security Number (xxxx) and Date of Birth (mm/dd/yyyy) as indicated and click Submit. You will be taken to the next sign-up page. 5. Create a Sandman D&Rt ID. This will be your Stylr login ID and cannot be changed, so think of one that is secure and easy to remember. 6. Create a Stylr password. You can change your password at any time. 7. Enter your Password Reset Question and Answer. This can be used at a later time if you forget your password. 8. Enter your e-mail address. You will receive e-mail notification when new information is available in [address] 9. Click Sign Up. You can now view and download portions of your medical record. 10. Click the Download Summary menu link to download a portable copy of your medical information. If you have questions, please visit the Frequently Asked Questions section of the Stylr website. Remember, Stylr is NOT to be used for urgent needs. For medical emergencies, dial 911. Now available from your iPhone and Android! Please provide this summary of care documentation to your next provider. Your primary care clinician is listed as Celesta Simple If you have any questions after today's visit, please call [phone].

## 2018-09-11 ENCOUNTER — PATIENT OUTREACH (OUTPATIENT)
Dept: INTERNAL MEDICINE CLINIC | Age: 73
End: 2018-09-11

## 2018-09-12 ENCOUNTER — TELEPHONE (OUTPATIENT)
Dept: INTERNAL MEDICINE CLINIC | Age: 73
End: 2018-09-12

## 2018-09-12 NOTE — LETTER
9/14/2018 4:07 PM 
 
Mr. Ryan Nj [address] [phone] To whom it may concern; 
 
Ryan Nj, 1945 is a patient under my care here at [facility]. He has suffered from a stroke and would benefit from the use of a golf cart while playing golf. If there are questions please feel free to call me at the above number. Sincerely, Stephany Roca III, DO

## 2018-09-13 NOTE — TELEPHONE ENCOUNTER
Pt returned the call to \"Ailyn\" unsure what call was in reference to.        Best [phone]       Message received & copied from [facility]

## 2018-09-14 ENCOUNTER — PATIENT OUTREACH (OUTPATIENT)
Dept: INTERNAL MEDICINE CLINIC | Age: 73
End: 2018-09-14

## 2018-09-14 NOTE — PROGRESS NOTES
NN outreach to patient today in order to perform VBD EARLE follow-up for diagnosis of CVA; two patient identifiers verified. Goals Addressed Chronic Disease  COMPLETED: Impaired Physical Mobility related to Stroke 8/2/2018 
- patient reports Referral to Neuro Outpatient Therapy ordered at hospital discharge; reports initial appointment scheduled for 8/6/18 [facility] [address] location 
- ambulating independently. Denies falls post-discharge. Denies concerns regarding gait instability; denies concerns regarding ambulating steps at home. Denies DME needs at this time. - IP SLP documentation reviewed-no recommendation for SLP at this time 
- reviewed signs/symptoms of stroke with patient - patient will begin Outpatient Therapy on 8/6/18 as recommended by Neurology;. Patient to contact NN/PCP office as needed for questions/concerns. 9/14/2018 
- reports discharged from outpatient therapy last week 
- reports MD clearance to resume driving 
- states, \"I'm progressing well\" - will be going on a golf trip 9/19/18 and is requesting medical letter stating that he requires use of a golf cart due to difficulty ambulating long distances; requesting letter to be mailed once completed; staff message sent to LPN  and PCP 
- denies additional questions/concerns, needs at this time 
- Goal Completed Future Appointments: 
Future Appointments Date Time Provider Temitope Moreau 11/8/2018 9:30 AM Dariel Velarde III, DO [facility] ANIKA YEUNG  
3/7/2019 10:40 AM Lois Jonest, DO [facility] Last Appointment My Department: 
8/16/2018

## 2018-09-14 NOTE — TELEPHONE ENCOUNTER
#[phone] pt states he is returning Ailyn's call and doesn't know what it pertains to, but he is upset she isn't returning his calls.

## 2018-09-14 NOTE — TELEPHONE ENCOUNTER
Patient will be going on a golf trip 9/19/18 and is requesting letter from PCP stating that he requires use of a golf cart due to difficulty ambulating long distances; requesting letter to be mailed once completed. Routing patient request to LPN  and PCP.

## 2018-09-26 ENCOUNTER — PATIENT OUTREACH (OUTPATIENT)
Dept: INTERNAL MEDICINE CLINIC | Age: 73
End: 2018-09-26

## 2018-11-08 ENCOUNTER — OFFICE VISIT (OUTPATIENT)
Dept: INTERNAL MEDICINE CLINIC | Age: 73
End: 2018-11-08

## 2018-11-08 VITALS
SYSTOLIC BLOOD PRESSURE: 126 MMHG | OXYGEN SATURATION: 100 % | HEIGHT: 69 IN | DIASTOLIC BLOOD PRESSURE: 67 MMHG | RESPIRATION RATE: 16 BRPM | WEIGHT: 174 LBS | TEMPERATURE: 97.4 F | BODY MASS INDEX: 25.77 KG/M2 | HEART RATE: 61 BPM

## 2018-11-08 DIAGNOSIS — I63.9 CEREBROVASCULAR ACCIDENT (CVA), UNSPECIFIED MECHANISM (HCC): ICD-10-CM

## 2018-11-08 DIAGNOSIS — E78.5 DYSLIPIDEMIA: ICD-10-CM

## 2018-11-08 DIAGNOSIS — G47.39 OTHER SLEEP APNEA: ICD-10-CM

## 2018-11-08 DIAGNOSIS — I10 ESSENTIAL HYPERTENSION: Primary | ICD-10-CM

## 2018-11-08 DIAGNOSIS — M17.11 PRIMARY OSTEOARTHRITIS OF RIGHT KNEE: ICD-10-CM

## 2018-11-08 NOTE — PROGRESS NOTES
An Cruz is a 72 y.o. male who presents for evaluation of routine follow up. Last seen by me aug 16, 2018, when he was follow up for a cva. Doing well, has finished therapy. Still with some mild left sided weakness, typically worse at end of the day and when tired. ROS: 
Constitutional: negative for fevers, chills, anorexia and weight loss Eyes:   negative for visual disturbance and irritation ENT:   negative for tinnitus,sore throat,nasal congestion,ear pain,hoarseness Respiratory:  negative for cough, hemoptysis, dyspnea,wheezing CV:   negative for chest pain, palpitations, lower extremity edema GI:   negative for nausea, vomiting, diarrhea, abdominal pain,melena Genitourinary: negative for frequency, dysuria and hematuria Musculoskel: negative for myalgias, arthralgias, back pain, muscle weakness, joint pain Neurological:  negative for headaches, dizziness, focal weakness, numbness Psychiatric:     Negative for depression or anxiety Past Medical History:  
Diagnosis Date  Degenerative arthritis of knee 12/7/2009  Dermatitis,nonspecific 12/7/2009  Dyslipidemia 12/7/2009  Glaucoma 12/7/2009  Stroke ([facility] [address] Past Surgical History:  
Procedure Laterality Date  HX SKIN BIOPSY  04/15  
 on forehead, pre-melanoma Family History Problem Relation Age of Onset  Cancer Mother  Heart Disease Father Social History Socioeconomic History  Marital status:  Spouse name: Not on file  Number of children: Not on file  Years of education: Not on file  Highest education level: Not on file Social Needs  Financial resource strain: Not on file  Food insecurity - worry: Not on file  Food insecurity - inability: Not on file  Transportation needs - medical: Not on file  Transportation needs - non-medical: Not on file Occupational History  Not on file Tobacco Use  Smoking status: Never Smoker  Smokeless tobacco: Never Used Substance and Sexual Activity  Alcohol use: Yes Comment: not to excess  Drug use: No  
 Sexual activity: Yes  
  Partners: Female Other Topics Concern  Not on file Social History Narrative  Not on file Visit Vitals /67 (BP 1 Location: Left arm, BP Patient Position: Sitting) Pulse 61 Temp 97.4 °F (36.3 °C) (Oral) Resp 16 Ht 5' 9\" (1.753 m) Wt 174 lb (78.9 kg) SpO2 100% BMI 25.70 kg/m² Physical Examination:  
General - Well appearing male HEENT - PERRL, TM no erythema/opacification, normal nasal turbinates, no oropharyngeal erythema or exudate, MMM Neck - supple, no bruits, no thyroidomegaly, no lymphadenopathy Pulm - clear to auscultation bilaterally Cardio - RRR, normal S1 S2, no murmur Abd - soft, nontender, no masses, no HSM Extrem - no edema, +2 distal pulses Neuro-  No focal deficits, CN intact Assessment/Plan: 1.  Cva, with left sided weakness--on plavix and asa now (had only been on asa prior to event). Continue with exercises at home 2. htn--well controlled with lisinopril 3.  hyperlipids--on zocor now 4.  rodney--has appt with sleep team, but not until jan 5. Right knee oa--hopes to have TKA done at [facility] in early 2019 (waiting at least 6 months sp cva) 6. Routine adult health maintenance--had flu shot early oct, has rx for shingrix but has been unable to get yet due to availability. rtc 6 months Leo Jenkins III, DO

## 2018-11-08 NOTE — PATIENT INSTRUCTIONS
Partial Knee Replacement: Before Your Surgery What is partial knee replacement? Partial knee replacement is used when one side of the knee is damaged. It replaces only the damaged part of the knee. Your doctor will make a cut in your knee. This cut is called an incision. It will leave a scar that usually fades with time. You may be able to go home the same day. If you have partials on both knees at once, you may need to stay in the hospital for a day or more. Most people go back to normal activities or work in 6 to 12 weeks. This depends on your health. It also depends on how well your knee does in your rehab program. This may take longer if you have both knees done at the same time. Follow-up care is a key part of your treatment and safety. Be sure to make and go to all appointments, and call your doctor if you are having problems. It's also a good idea to know your test results and keep a list of the medicines you take. What happens before surgery? 
 Surgery can be stressful. This information will help you understand what you can expect. And it will help you safely prepare for surgery. 
 Preparing for surgery 
  · Understand exactly what surgery is planned, along with the risks, benefits, and other options. · Tell your doctors ALL the medicines, vitamins, supplements, and herbal remedies you take. Some of these can increase the risk of bleeding or interact with anesthesia.  
  · If you take blood thinners, such as warfarin (Coumadin), clopidogrel (Plavix), or aspirin, be sure to talk to your doctor. He or she will tell you if you should stop taking these medicines before your surgery. Make sure that you understand exactly what your doctor wants you to do.  
  · Your doctor will tell you which medicines to take or stop before your surgery. You may need to stop taking certain medicines a week or more before surgery. So talk to your doctor as soon as you can.   · If you have an advance directive, let your doctor know. It may include a living will and a durable power of  for health care. Bring a copy to the hospital. If you don't have one, you may want to prepare one. It lets your doctor and loved ones know your health care wishes. Doctors advise that everyone prepare these papers before any type of surgery or procedure.  
  · You may need to shower or bathe with a special soap the night before and the morning of your surgery. The soap contains chlorhexidine. It reduces the amount of bacteria on your skin that could cause an infection after surgery. What happens on the day of surgery? · Follow the instructions exactly about when to stop eating and drinking. If you don't, your surgery may be canceled. If your doctor told you to take your medicines on the day of surgery, take them with only a sip of water.  
  · Take a bath or shower before you come in for your surgery. Do not apply lotions, perfumes, deodorants, or nail polish.  
  · Do not shave the surgical site yourself.  
  · Take off all jewelry and piercings. And take out contact lenses, if you wear them.  
 At the hospital or surgery center · Bring a picture ID.  
  · The area for surgery is often marked to make sure there are no errors.  
  · You will be kept comfortable and safe by your anesthesia provider. The anesthesia may make you sleep. Or it may just numb the area being worked on.  
  · You may also get a shot of medicine into your spine. This will make your legs numb. You will not feel pain during the surgery.  
  · You also will get antibiotics through an IV tube before surgery. This lowers the risk of an infection of the incision.  
  · The surgery will take about 2 to 3 hours. Going home · Be sure you have someone to drive you home.  Anesthesia and pain medicine make it unsafe for you to drive.  
  · You will be given more specific instructions about recovering from your surgery. They will cover things like diet, wound care, follow-up care, driving, and getting back to your normal routine. When should you call your doctor? · You have questions or concerns.  
  · You don't understand how to prepare for your surgery.  
  · You become ill before the surgery (such as fever, flu, or a cold).  
  · You need to reschedule or have changed your mind about having the surgery. Where can you learn more? Go to http://marie-jose.info/. Enter [phone] in the search box to learn more about \"Partial Knee Replacement: Before Your Surgery. \" Current as of: November 29, 2017 Content Version: 11.8 © 9943-0649 Healthwise, Incorporated. Care instructions adapted under license by [employer] (which disclaims liability or warranty for this information). If you have questions about a medical condition or this instruction, always ask your healthcare professional. [address] any warranty or liability for your use of this information.

## 2018-11-08 NOTE — PROGRESS NOTES
Reviewed record in preparation for visit and have obtained necessary documentation. Identified pt with two pt identifiers(name and ). Chief Complaint Patient presents with  Hypertension 3 month follow up  Cholesterol Problem Health Maintenance Due Topic Date Due  Shingrix Vaccine Age 50> (1 of 2) 10/15/1997  Influenza Age 5 to Adult  2018 Mr. Paula Vo has a reminder for a \"due or due soon\" health maintenance. I have asked that he discuss health maintenance topic(s) due with His  primary care provider. Coordination of Care Questionnaire: 
:  
 
1) Have you been to an emergency room, urgent care clinic since your last visit? no  
Hospitalized since your last visit? no          
 
2) Have you seen or consulted any other health care providers outside of [address] [place] [address] since your last visit? yes  (Include any pap smears or colon screenings in this section.) 3) Do you have an Advance Directive on file? no 
 
4) Are you interested in receiving information on Advance Directives? YES Patient is accompanied by self I have received verbal consent from Tigist Almeida to discuss any/all medical information while they are present in the room.

## 2018-11-27 RX ORDER — CLOPIDOGREL BISULFATE 75 MG/1
TABLET ORAL
Qty: 30 TAB | Refills: 1 | Status: SHIPPED | OUTPATIENT
Start: 2018-11-27 | End: 2019-01-25 | Stop reason: SDUPTHER

## 2019-01-10 ENCOUNTER — OFFICE VISIT (OUTPATIENT)
Dept: SLEEP MEDICINE | Age: 74
End: 2019-01-10

## 2019-01-10 VITALS
BODY MASS INDEX: 25.74 KG/M2 | HEART RATE: 56 BPM | OXYGEN SATURATION: 99 % | DIASTOLIC BLOOD PRESSURE: 74 MMHG | SYSTOLIC BLOOD PRESSURE: 147 MMHG | WEIGHT: 173.8 LBS | HEIGHT: 69 IN

## 2019-01-10 DIAGNOSIS — Z86.73 H/O TIA (TRANSIENT ISCHEMIC ATTACK) AND STROKE: ICD-10-CM

## 2019-01-10 DIAGNOSIS — G47.33 OSA (OBSTRUCTIVE SLEEP APNEA): Primary | ICD-10-CM

## 2019-01-10 NOTE — PATIENT INSTRUCTIONS
[address] [facility] [place]. [place], [address]  Tel.  [phone]  Fax. 100 [facility] 60  [place], [address] [facility]  Tel.  [phone]  Fax. [phone] [address]  Tel.  [phone]  Fax. [phone]     Sleep Apnea: After Your Visit  Your Care Instructions  Sleep apnea occurs when you frequently stop breathing for 10 seconds or longer during sleep. It can be mild to severe, based on the number of times per hour that you stop breathing or have slowed breathing. Blocked or narrowed airways in your nose, mouth, or throat can cause sleep apnea. Your airway can become blocked when your throat muscles and tongue relax during sleep. Sleep apnea is common, occurring in 1 out of 20 individuals. Individuals having any of the following characteristics should be evaluated and treated right away due to high risk and detrimental consequences from untreated sleep apnea:  1. Obesity  2. Congestive Heart failure  3. Atrial Fibrillation  4. Uncontrolled Hypertension  5. Type II Diabetes  6. Night-time Arrhythmias  7. Stroke  8. Pulmonary Hypertension  9. High-risk Driving Populations (pilots, truck drivers, etc.)  10. Patients Considering Weight-loss Surgery    How do you know you have sleep apnea? You probably have sleep apnea if you answer 'yes' to 3 or more of the following questions:  S - Have you been told that you Snore? T - Are you often Tired during the day? O - Has anyone Observed you stop breathing while sleeping? P- Do you have (or are being treated for) high blood Pressure? B - Are you obese (Body Mass Index > 35)? A - Is your Age 48years old or older? N - Is your Neck size greater than 16 inches? G - Are you male Gender? A sleep physician can prescribe a breathing device that prevents tissues in the throat from blocking your airway.  Or your doctor may recommend using a dental device (oral breathing device) to help keep your airway open. In some cases, surgery may be needed to remove enlarged tissues in the throat. Follow-up care is a key part of your treatment and safety. Be sure to make and go to all appointments, and call your doctor if you are having problems. It's also a good idea to know your test results and keep a list of the medicines you take. How can you care for yourself at home? · Lose weight, if needed. It may reduce the number of times you stop breathing or have slowed breathing. · Go to bed at the same time every night. · Sleep on your side. It may stop mild apnea. If you tend to roll onto your back, sew a pocket in the back of your pajama top. Put a tennis ball into the pocket, and stitch the pocket shut. This will help keep you from sleeping on your back. · Avoid alcohol and medicines such as sleeping pills and sedatives before bed. · Do not smoke. Smoking can make sleep apnea worse. If you need help quitting, talk to your doctor about stop-smoking programs and medicines. These can increase your chances of quitting for good. · Prop up the head of your bed 4 to 6 inches by putting bricks under the legs of the bed. · Treat breathing problems, such as a stuffy nose, caused by a cold or allergies. · Use a continuous positive airway pressure (CPAP) breathing machine if lifestyle changes do not help your apnea and your doctor recommends it. The machine keeps your airway from closing when you sleep. · If CPAP does not help you, ask your doctor whether you should try other breathing machines. A bilevel positive airway pressure machine has two types of air pressureâone for breathing in and one for breathing out. Another device raises or lowers air pressure as needed while you breathe. · If your nose feels dry or bleeds when using one of these machines, talk with your doctor about increasing moisture in the air. A humidifier may help.   · If your nose is runny or stuffy from using a breathing machine, talk with your doctor about using decongestants or a corticosteroid nasal spray. When should you call for help? Watch closely for changes in your health, and be sure to contact your doctor if:  · You still have sleep apnea even though you have made lifestyle changes. · You are thinking of trying a device such as CPAP. · You are having problems using a CPAP or similar machine. Where can you learn more? Go to [employer]. Enter X310 in the search box to learn more about \"Sleep Apnea: After Your Visit. \"   © 6846-0194 Healthwise, Incorporated. Care instructions adapted under license by [employer] (which disclaims liability or warranty for this information). This care instruction is for use with your licensed healthcare professional. If you have questions about a medical condition or this instruction, always ask your healthcare professional. Otilio Deems any warranty or liability for your use of this information. PROPER SLEEP HYGIENE    What to avoid  · Do not have drinks with caffeine, such as coffee or black tea, for 8 hours before bed. · Do not smoke or use other types of tobacco near bedtime. Nicotine is a stimulant and can keep you awake. · Avoid drinking alcohol late in the evening, because it can cause you to wake in the middle of the night. · Do not eat a big meal close to bedtime. If you are hungry, eat a light snack. · Do not drink a lot of water close to bedtime, because the need to urinate may wake you up during the night. · Do not read or watch TV in bed. Use the bed only for sleeping and sexual activity. What to try  · Go to bed at the same time every night, and wake up at the same time every morning. Do not take naps during the day. · Keep your bedroom quiet, dark, and cool. · Get regular exercise, but not within 3 to 4 hours of your bedtime. .  · Sleep on a comfortable pillow and mattress.   · If watching the clock makes you anxious, turn it facing away from you so you cannot see the time. · If you worry when you lie down, start a worry book. Well before bedtime, write down your worries, and then set the book and your concerns aside. · Try meditation or other relaxation techniques before you go to bed. · If you cannot fall asleep, get up and go to another room until you feel sleepy. Do something relaxing. Repeat your bedtime routine before you go to bed again. · Make your house quiet and calm about an hour before bedtime. Turn down the lights, turn off the TV, log off the computer, and turn down the volume on music. This can help you relax after a busy day. Drowsy Driving  The [address] [place] [address] Road Traffic Safety Administration cites drowsiness as a causing factor in more than 151,810 police reported crashes annually, resulting in 76,000 injuries and 1,500 deaths. Other surveys suggest 55% of people polled have driven while drowsy in the past year, 23% had fallen asleep but not crashed, 3% crashed, and 2% had and accident due to drowsy driving. Who is at risk? Young Drivers: One study of drowsy driving accidents states that 55% of the drivers were under 25 years. Of those, 75% were male. Shift Workers and Travelers: People who work overnight or travel across time zones frequently are at higher risk of experiencing Circadian Rhythm Disorders. They are trying to work and function when their body is programed to sleep. Sleep Deprived: Lack of sleep has a serious impact on your ability to pay attention or focus on a task. Consistently getting less than the average of 8 hours your body needs creates partial or cumulative sleep deprivation. Untreated Sleep Disorders: Sleep Apnea, Narcolepsy, R.L.S., and other sleep disorders (untreated) prevent a person from getting enough restful sleep. This leads to excessive daytime sleepiness and increases the risk for drowsy driving accidents by up to 7 times.   Medications / Alcohol: Even over the counter medications can cause drowsiness. Medications that impair a drivers attention should have a warning label. Alcohol naturally makes you sleepy and on its own can cause accidents. Combined with excessive drowsiness its effects are amplified. Signs of Drowsy Driving:   * You don't remember driving the last few miles   * You may drift out of your sridevi   * You are unable to focus and your thoughts wander   * You may yawn more often than normal   * You have difficulty keeping your eyes open / nodding off   * Missing traffic signs, speeding, or tailgating  Prevention-   Good sleep hygiene, lifestyle and behavioral choices have the most impact on drowsy driving. There is no substitute for sleep and the average person requires 8 hours nightly. If you find yourself driving drowsy, stop and sleep. Consider the sleep hygiene tips provided during your visit as well. Medication Refill Policy: Refills for all medications require 1 week advance notice. Please have your pharmacy fax a refill request. We are unable to fax, or call in \"controled substance\" medications and you will need to pick these prescriptions up from our office. TempoIQ Activation    Thank you for requesting access to TempoIQ. Please follow the instructions below to securely access and download your online medical record. TempoIQ allows you to send messages to your doctor, view your test results, renew your prescriptions, schedule appointments, and more. How Do I Sign Up? 1. In your internet browser, go to [email or web address]. 2. Click on the First Time User? Click Here link in the Sign In box. You will see the New Member Sign Up page. 3. Enter your TempoIQ Access Code exactly as it appears below. You will not need to use this code after youve completed the sign-up process. If you do not sign up before the expiration date, you must request a new code.     TempoIQ Access Code: [email or web address]  Expires: 2/6/2019  9:34 AM (This is the date your iPinYou access code will )    4. Enter the last four digits of your Social Security Number (xxxx) and Date of Birth (mm/dd/yyyy) as indicated and click Submit. You will be taken to the next sign-up page. 5. Create a imedot ID. This will be your iPinYou login ID and cannot be changed, so think of one that is secure and easy to remember. 6. Create a iPinYou password. You can change your password at any time. 7. Enter your Password Reset Question and Answer. This can be used at a later time if you forget your password. 8. Enter your e-mail address. You will receive e-mail notification when new information is available in [address] 9. Click Sign Up. You can now view and download portions of your medical record. 10. Click the Download Summary menu link to download a portable copy of your medical information. Additional Information    If you have questions, please call [phone]. Remember, iPinYou is NOT to be used for urgent needs. For medical emergencies, dial 911.

## 2019-01-10 NOTE — PROGRESS NOTES
217 [facility]., [place]. [place], [address]  Tel.  [phone]  Fax. 100 [facility] 60  [place], [address] [facility]  Tel.  [phone]  Fax. [phone] [address]   Tel.  [phone]  Fax. [phone]         Subjective:      Hood Mejia is an 72 y.o. male referred for evaluation for a sleep disorder. He complains of snoring associated with snorting, periods of not breathing, excessive daytime sleepiness. Symptoms began several years ago, gradually worsening since that time. He usually can fall asleep in 5 minutes. Family or house members note snoring, snorting, periods of not breathing. He denies completely or partially paralyzed while falling asleep or waking up. Hood Mejia does wake up frequently at night. He is not bothered by waking up too early and left unable to get back to sleep. He actually sleeps about 5 hours at night and wakes up about 3 times during the night. He does not work shifts:  . Ever[employer] Frame indicates he does not get too little sleep at night. His bedtime is 0000. He awakens at 0700. He does take naps. He takes 2 naps a week lasting 15 to 30, Minute(s). He has the following observed behaviors: Loud snoring, Light snoring, Pauses in breathing;  . Other remarks: Waking with a gasp or snort    [place] Sleepiness Score: 19 which reflect severe daytime drowsiness. Allergies   Allergen Reactions    Pravachol [Pravastatin] Rash    Sulfa (Sulfonamide Antibiotics) Other (comments)     \"fever; bleeding\"         Current Outpatient Medications:     clopidogrel (PLAVIX) 75 mg tab, TAKE 1 TABLET BY MOUTH EVERY DAY, Disp: 30 Tab, Rfl: 1    lisinopril (PRINIVIL, ZESTRIL) 5 mg tablet, Take 1 Tab by mouth daily. , Disp: 30 Tab, Rfl: 11    simvastatin (ZOCOR) 40 mg tablet, TAKE 1 TABLET BY MOUTH NIGHTLY, Disp: 90 Tab, Rfl: 3    tadalafil (CIALIS) 2.5 mg tablet, Take 1 Tab by mouth as needed for Other., Disp: 12 Tab, Rfl: 3   aspirin delayed-release 81 mg tablet, Take 81 mg by mouth daily. , Disp: , Rfl:     cholecalciferol (VITAMIN D3) 1,000 unit tablet, Take 1,000 Units by mouth daily. , Disp: , Rfl:     latanoprost (XALATAN) 0.005 % ophthalmic solution, Administer 1 Drop to both eyes nightly. Dr. Filiberto Thompson , Disp: , Rfl:      He  has a past medical history of Degenerative arthritis of knee, Dermatitis,nonspecific, Dyslipidemia, Glaucoma, and Stroke ([facility] [address] He  has a past surgical history that includes hx skin biopsy (04/15). He family history includes Cancer in his mother; Heart Disease in his father. He  reports that  has never smoked. he has never used smokeless tobacco. He reports that he drinks alcohol. He reports that he does not use drugs. Review of Systems:  Constitutional:  No significant weight loss or weight gain  Eyes:  No blurred vision  CVS:  No significant chest pain  Pulm:  No significant shortness of breath  GI:  No significant nausea or vomiting  :  significant nocturia  Musculoskeletal:  No significant joint pain at night  Skin:  No significant rashes  Neuro:  No significant dizziness   Psych:  No active mood issues    Sleep Review of Systems: notable for no difficulty falling asleep; frequent awakenings at night;  regular dreaming noted; no nightmares ; no early morning headaches; no memory problems; no concentration issues; no history of any automobile or occupational accidents due to daytime drowsiness.       Objective:     Visit Vitals  /74   Pulse (!) 56   Ht 5' 9\" (1.753 m)   Wt 173 lb 12.8 oz (78.8 kg)   SpO2 99%   BMI 25.67 kg/m²         General:   Not in acute distress   Eyes:  Anicteric sclerae, no obvious strabismus   Nose:  No obvious nasal septum deviation    Oropharynx:   Class 4 oropharyngeal outlet, thick tongue base, uvula could not be seen due to low-lying soft palate, narrow tonsilo-pharyngeal pilars   Tonsils:   tonsils are not seen due to low-lying soft palate   Neck:   Neck circ. in \"inches\": 16.5; midline trachea   Chest/Lungs:  Equal lung expansion, clear on auscultation    CVS:  Normal rate, regular rhythm; no JVD   Skin:  Warm to touch; no obvious rashes   Neuro:  No focal deficits ; no obvious tremor    Psych:  Normal affect,  normal countenance;          Assessment:       ICD-10-CM ICD-9-CM    1. MAXINE (obstructive sleep apnea) G47.33 327.23 SLEEP STUDY UNATTENDED, 4 CHANNEL   2. H/O TIA (transient ischemic attack) and stroke Z86.73 V12.54    3. BMI 25.0-25.9,adult Z68.25 V85.21          Plan:     * The patient currently has a Moderate Risk for having sleep apnea. STOP-BANG score 5.  * Sleep testing was ordered for initial evaluation. * He was provided information on sleep apnea including coresponding risk factors and the importance of proper treatment. * Treatment options if indicated were reviewed today. Patient agrees to a trial of PAP therapy if indicated. * Counseling was provided regarding proper sleep hygiene (including effect of light on sleep), sleep environment safety and safe driving. * Patient agrees to telephone [phone]  follow-up by myself or lead sleep technologist shortly after sleep study to review results and plan final management.     (patient has given permission for a message to be left regarding test results and further management if patient cannot be cannot be reached directly). Thank you for allowing us to participate in your patient's medical care. We'll keep you updated on these investigations. Avila Gann MD, FAASM  Electronically signed.  01/10/19

## 2019-01-15 ENCOUNTER — OFFICE VISIT (OUTPATIENT)
Dept: SLEEP MEDICINE | Age: 74
End: 2019-01-15

## 2019-01-15 DIAGNOSIS — G47.33 OSA (OBSTRUCTIVE SLEEP APNEA): Primary | ICD-10-CM

## 2019-01-17 ENCOUNTER — HOSPITAL ENCOUNTER (OUTPATIENT)
Dept: SLEEP MEDICINE | Age: 74
Discharge: HOME | End: 2019-01-17
Payer: MEDICARE

## 2019-01-17 ENCOUNTER — DOCUMENTATION ONLY (OUTPATIENT)
Dept: SLEEP MEDICINE | Age: 74
End: 2019-01-17

## 2019-01-17 PROCEDURE — 95806 SLEEP STUDY UNATT&RESP EFFT: CPT | Performed by: INTERNAL MEDICINE

## 2019-01-22 ENCOUNTER — TELEPHONE (OUTPATIENT)
Dept: SLEEP MEDICINE | Age: 74
End: 2019-01-22

## 2019-01-22 DIAGNOSIS — G47.33 OSA (OBSTRUCTIVE SLEEP APNEA): Primary | ICD-10-CM

## 2019-01-22 NOTE — TELEPHONE ENCOUNTER
Kinsey Menon is to be contacted by lead sleep technologist regarding results of Sleep Testing which was indicative of an average AHI of 8.4 per hour with an SpO2 irina of 91% and SpO2 of < 88% being 0 minutes. An APAP prescription has been written and patient will be contacted by office staff regarding follow-up  in 2-3 months after initiation of therapy. Encounter Diagnosis   Name Primary?  MAXINE (obstructive sleep apnea) Yes       Orders Placed This Encounter    AMB SUPPLY ORDER     Diagnosis: Obstructive Sleep Apnea ICD-10 Code (G47.33)    Positive Airway Pressure Therapy: Duration of need: 99 months. ResMed APAP Device: Minimum Pressure: 4 cmH2O, Maximum Pressure: 20 cmH2O. Ramp Time: 30 Minutes. EPR: 2. CPAP mask -  Patient preference, headgear, tubing, and filter;  heated humidifier; wireless modem. Remote monitoring enrollment. Ana Wick MD, FAASM; NPI: [id number]  Electronically signed. 01/22/19

## 2019-01-22 NOTE — TELEPHONE ENCOUNTER
HSAT Returned - [facility]    Date of Study: 1/17/19    Study log in media

## 2019-01-25 ENCOUNTER — DOCUMENTATION ONLY (OUTPATIENT)
Dept: SLEEP MEDICINE | Age: 74
End: 2019-01-25

## 2019-01-29 ENCOUNTER — TELEPHONE (OUTPATIENT)
Dept: SLEEP MEDICINE | Age: 74
End: 2019-01-29

## 2019-01-31 RX ORDER — CLOPIDOGREL BISULFATE 75 MG/1
TABLET ORAL
Qty: 30 TAB | Refills: 1 | Status: SHIPPED | OUTPATIENT
Start: 2019-01-31 | End: 2019-04-04 | Stop reason: SDUPTHER

## 2019-01-31 NOTE — TELEPHONE ENCOUNTER
Requested Prescriptions     Pending Prescriptions Disp Refills    clopidogrel (PLAVIX) 75 mg tab [Pharmacy Med Name: CLOPIDOGREL 75 MG TABLET] 30 Tab 1     Sig: TAKE 1 TABLET BY MOUTH EVERY DAY     Pt is out of meds, Please call back     Pt is at the pharmacy now.

## 2019-02-14 ENCOUNTER — OFFICE VISIT (OUTPATIENT)
Dept: NEUROLOGY | Age: 74
End: 2019-02-14

## 2019-02-14 VITALS
BODY MASS INDEX: 26.07 KG/M2 | OXYGEN SATURATION: 99 % | SYSTOLIC BLOOD PRESSURE: 122 MMHG | DIASTOLIC BLOOD PRESSURE: 74 MMHG | HEIGHT: 69 IN | WEIGHT: 176 LBS | RESPIRATION RATE: 18 BRPM | HEART RATE: 54 BPM

## 2019-02-14 DIAGNOSIS — E78.5 DYSLIPIDEMIA: ICD-10-CM

## 2019-02-14 DIAGNOSIS — I10 ESSENTIAL HYPERTENSION: ICD-10-CM

## 2019-02-14 DIAGNOSIS — I63.9 CEREBROVASCULAR ACCIDENT (CVA), UNSPECIFIED MECHANISM (HCC): Primary | ICD-10-CM

## 2019-02-14 DIAGNOSIS — I67.9 INTRACRANIAL VASCULAR STENOSIS: ICD-10-CM

## 2019-02-14 NOTE — PROGRESS NOTES
Neurology Clinic Follow up Note Patient ID: 
Kinsey Menon [id number] 
72 y.o. 
1945 Mr. Radha Lawrence is here for follow up today of Chief Complaint Patient presents with  Neurologic Problem Last Appointment With Me: 
8/2018 Hospital F/U Stroke Interval History:  
Pt returns for f/u of R thalamic stroke, moderate to severe stenosis of the R PCA/P2 segment. He admits to some difficulty writing with the left hand. Denies significant ataxia/incoordination. Balance is good. Compliant with ASA/Plavix and statin therapy for stroke prevention. He denies recurrence of stroke s/s since discharge. Pt reports he is getting his L shoulder replaced 2/27/19. He is here for medical clearance. PMHx/ PSHx/ FHx/ SHx:  Reviewed and unchanged previous visit. Past Medical History:  
Diagnosis Date  Degenerative arthritis of knee 12/7/2009  Dermatitis,nonspecific 12/7/2009  Dyslipidemia 12/7/2009  Glaucoma 12/7/2009  Stroke ([facility] [address] ROS: 
Comprehensive review of systems negative except for as noted above. Objective:  
 
 
Meds: 
Current Outpatient Medications Medication Sig Dispense Refill  clopidogrel (PLAVIX) 75 mg tab TAKE 1 TABLET BY MOUTH EVERY DAY 30 Tab 1  
 lisinopril (PRINIVIL, ZESTRIL) 5 mg tablet Take 1 Tab by mouth daily. 30 Tab 11  
 simvastatin (ZOCOR) 40 mg tablet TAKE 1 TABLET BY MOUTH NIGHTLY 90 Tab 3  
 tadalafil (CIALIS) 2.5 mg tablet Take 1 Tab by mouth as needed for Other. 12 Tab 3  cholecalciferol (VITAMIN D3) 1,000 unit tablet Take 1,000 Units by mouth daily.  aspirin delayed-release 81 mg tablet Take 81 mg by mouth daily.  latanoprost (XALATAN) 0.005 % ophthalmic solution Administer 1 Drop to both eyes nightly. Dr. Anthony Otto Exam: 
Visit Vitals /74 Pulse (!) 54 Resp 18 Ht 5' 9\" (1.753 m) Wt 79.8 kg (176 lb) SpO2 99% BMI 25.99 kg/m² Neurological Exam: Mental Status: Alert and oriented to person place and time Speech: Fluent no aphasia or dysarthria Cranial Nerves:   Intact visual fields.  Facial sensation is normal. Facial movement is symmetric.  Palate is midline.  Normal sternocleidomastoid strength. Tongue is midline. Hearing is intact bilaterally. Eyes: PERRL, EOM's full, no nystagmus, no ptosis. Motor:  LUE proximal>distal 4+/5, otherwise 5/5 Reflexes:   Deep tendon reflexes 2+/4 and symmetrical.  Plantar response is downgoing b/l. Sensory:   Symmetrically intact  with no perceived deficits modalities involving small or large fibers. Gait:  Normal based, steady Tremor:   No tremor noted. Cerebellar:  No ataxia  
  
 
 
LABS Results for orders placed or performed during the hospital encounter of 07/30/18 CBC WITH AUTOMATED DIFF Result Value Ref Range WBC 5.7 4.1 - 11.1 K/uL  
 RBC 4.60 4.10 - 5.70 M/uL  
 HGB 13.9 12.1 - 17.0 g/dL HCT 43.1 36.6 - 50.3 % MCV 93.7 80.0 - 99.0 FL  
 MCH 30.2 26.0 - 34.0 PG  
 MCHC 32.3 30.0 - 36.5 g/dL  
 RDW 13.8 11.5 - 14.5 % PLATELET 146 732 - 292 K/uL MPV 10.5 8.9 - 12.9 FL  
 NRBC 0.0 0  WBC ABSOLUTE NRBC 0.00 0.00 - 0.01 K/uL NEUTROPHILS 68 32 - 75 % LYMPHOCYTES 24 12 - 49 % MONOCYTES 7 5 - 13 % EOSINOPHILS 1 0 - 7 % BASOPHILS 0 0 - 1 % IMMATURE GRANULOCYTES 0 0.0 - 0.5 % ABS. NEUTROPHILS 3.9 1.8 - 8.0 K/UL  
 ABS. LYMPHOCYTES 1.3 0.8 - 3.5 K/UL  
 ABS. MONOCYTES 0.4 0.0 - 1.0 K/UL  
 ABS. EOSINOPHILS 0.1 0.0 - 0.4 K/UL  
 ABS. BASOPHILS 0.0 0.0 - 0.1 K/UL  
 ABS. IMM. GRANS. 0.0 0.00 - 0.04 K/UL  
 DF AUTOMATED METABOLIC PANEL, COMPREHENSIVE Result Value Ref Range Sodium 142 136 - 145 mmol/L Potassium 3.6 3.5 - 5.1 mmol/L Chloride 106 97 - 108 mmol/L  
 CO2 30 21 - 32 mmol/L Anion gap 6 5 - 15 mmol/L Glucose 91 65 - 100 mg/dL BUN 21 (H) 6 - 20 MG/DL  Creatinine 1.09 0.70 - 1.30 MG/DL  
 BUN/Creatinine ratio 19 12 - 20    
 GFR est AA >60 >60 ml/min/1.73m2 GFR est non-AA >60 >60 ml/min/1.73m2 Calcium 9.2 8.5 - 10.1 MG/DL Bilirubin, total 0.5 0.2 - 1.0 MG/DL  
 ALT (SGPT) 28 12 - 78 U/L  
 AST (SGOT) 21 15 - 37 U/L Alk. phosphatase 63 45 - 117 U/L Protein, total 7.5 6.4 - 8.2 g/dL Albumin 3.7 3.5 - 5.0 g/dL Globulin 3.8 2.0 - 4.0 g/dL A-G Ratio 1.0 (L) 1.1 - 2.2 LIPID PANEL Result Value Ref Range LIPID PROFILE Cholesterol, total 160 <200 MG/DL Triglyceride 69 <150 MG/DL  
 HDL Cholesterol 74 MG/DL  
 LDL, calculated 72.2 0 - 100 MG/DL VLDL, calculated 13.8 MG/DL  
 CHOL/HDL Ratio 2.2 0 - 5.0 HEMOGLOBIN A1C WITH EAG Result Value Ref Range Hemoglobin A1c 5.6 4.2 - 6.3 % Est. average glucose 114 mg/dL CBC WITH AUTOMATED DIFF Result Value Ref Range WBC 5.1 4.1 - 11.1 K/uL  
 RBC 4.08 (L) 4.10 - 5.70 M/uL  
 HGB 12.2 12.1 - 17.0 g/dL HCT 37.7 36.6 - 50.3 % MCV 92.4 80.0 - 99.0 FL  
 MCH 29.9 26.0 - 34.0 PG  
 MCHC 32.4 30.0 - 36.5 g/dL  
 RDW 13.5 11.5 - 14.5 % PLATELET 675 788 - 327 K/uL MPV 10.6 8.9 - 12.9 FL  
 NRBC 0.0 0  WBC ABSOLUTE NRBC 0.00 0.00 - 0.01 K/uL NEUTROPHILS 64 32 - 75 % LYMPHOCYTES 27 12 - 49 % MONOCYTES 7 5 - 13 % EOSINOPHILS 2 0 - 7 % BASOPHILS 0 0 - 1 % IMMATURE GRANULOCYTES 0 0.0 - 0.5 % ABS. NEUTROPHILS 3.3 1.8 - 8.0 K/UL  
 ABS. LYMPHOCYTES 1.4 0.8 - 3.5 K/UL  
 ABS. MONOCYTES 0.3 0.0 - 1.0 K/UL  
 ABS. EOSINOPHILS 0.1 0.0 - 0.4 K/UL  
 ABS. BASOPHILS 0.0 0.0 - 0.1 K/UL  
 ABS. IMM. GRANS. 0.0 0.00 - 0.04 K/UL  
 DF AUTOMATED METABOLIC PANEL, BASIC Result Value Ref Range Sodium 142 136 - 145 mmol/L Potassium 3.9 3.5 - 5.1 mmol/L Chloride 110 (H) 97 - 108 mmol/L  
 CO2 26 21 - 32 mmol/L Anion gap 6 5 - 15 mmol/L Glucose 101 (H) 65 - 100 mg/dL BUN 19 6 - 20 MG/DL Creatinine 0.93 0.70 - 1.30 MG/DL  
 BUN/Creatinine ratio 20 12 - 20 GFR est AA >60 >60 ml/min/1.73m2 GFR est non-AA >60 >60 ml/min/1.73m2 Calcium 8.0 (L) 8.5 - 10.1 MG/DL  
EKG, 12 LEAD, INITIAL Result Value Ref Range Ventricular Rate 58 BPM  
 Atrial Rate 58 BPM  
 P-R Interval 152 ms QRS Duration 112 ms  
 Q-T Interval 426 ms  
 QTC Calculation (Bezet) 418 ms Calculated R Axis -57 degrees Calculated T Axis -5 degrees Diagnosis Sinus bradycardia Leftward axis Voltage criteria for left ventricular hypertrophy No previous ECGs available Confirmed by Obi Sanches M.D., Saleem Arzate ([id number]) on 7/31/2018 7:37:11 AM 
  
 
 
IMAGING: 
MRI Results (most recent): 
Results from Hospital Encounter encounter on 07/30/18 MRA NECK W CONT Narrative INDICATION:  ATAXIA/ SLURRED SPEECH/ FALL/ AMS/ BLURRED VISION/ STROKE  
 
COMPARISON:  7/30/18 TECHNIQUE:  MR imaging of the brain was performed with sagittal T1, axial T1, 
T2, FLAIR, GRE, DWI/ADC; pre and post contrast multiplanar T1 utilizing 8 mL 
gadolinium. 3-D time-of-flight MRA of the brain was performed. Multiplanar 
reconstructions were obtained. Contrast enhanced coronal acquisition MRA of the 
neck was performed. Multiplanar reconstructions were obtained. FINDINGS:   
 
Ventricles:  Midline, no hydrocephalus. Brain Parenchyma/Brainstem:  Normal for age. Small to moderate-sized area of 
acute infarction in the genuine of the right internal capsule/ventral thalamus. Intracranial Hemorrhage:  None. Basal Cisterns:  Normal.  
Flow Voids:  Normal. 
Post Contrast:  No abnormal parenchymal or meningeal enhancement. Additional Comments:  N/A. MRA NECK Aortic Arch:  Visualized portions unremarkable. Carotid Arteries:  No significant stenosis by NASCET criteria. Vertebral Arteries:  Patent with no significant stenosis Additional Comments:  N/A. MRA HEAD Posterior Circulation:  Vertebral and basilar arteries are patent. There is a 
moderate to severe stenosis in the right P2 posterior cerebral artery segment without flow limitation. Left posterior cerebral artery is patent. Anterior Circulation:  No flow limiting stenosis or occlusion. Additional Comments:  No evidence of aneurysm or vascular malformation. Impression IMPRESSION: 
1. Small to moderate-sized area of acute infarction genu right internal 
capsule/ventral thalamus. 2.  No flow limiting stenosis or arterial occlusion. Moderate to severe 
stenosis right posterior cerebral artery. Assessment:  
 
Encounter Diagnoses ICD-10-CM ICD-9-CM 1. Cerebrovascular accident (CVA), unspecified mechanism ([facility] [address] I63.9 434.91  
2. Intracranial vascular stenosis I67.9 437.9 3. Dyslipidemia E78.5 272.4 4. Essential hypertension I836.9  
81year old LHM h/o HPL, glaucoma here for f/u of dizziness, dysarthria, gait instability 7/28/18.  MRI brain independently reviewed showing evidence of a moderate acute R thalamic infarct likely related to large artery atherosclerotic disease. MRA H/N with moderate to severe stenosis of the R PCA/P2 segment. TTE without shunt. He has done well post discharge from a stroke perspective without recurrence of focal neurologic deficits on dual antiplatelet therapy. He plans to have upcoming L shoulder replacement and is here to discuss stroke risk and mitigation maurisio-operatively. We discussed that his stroke risk remains high due to stroke risk factors including HTN, HPL, large artery atherosclerosis and previous stroke. Advise limiting time off of antiplatelet therapy maurisio-operatively. Plan:  
Cont. ASA 81mg + Plavix 75mg for stroke prevention Cont. Statin therapy, goal LDL<70 SBP goal <140 Follow-up Disposition: 
Return in about 1 year (around 2/14/2020).  
 
 
Signed: 
Mary Man DO 
2/14/2019 
9:27 AM

## 2019-02-14 NOTE — PATIENT INSTRUCTIONS
A Healthy Lifestyle: Care Instructions Your Care Instructions A healthy lifestyle can help you feel good, stay at a healthy weight, and have plenty of energy for both work and play. A healthy lifestyle is something you can share with your whole family. A healthy lifestyle also can lower your risk for serious health problems, such as high blood pressure, heart disease, and diabetes. You can follow a few steps listed below to improve your health and the health of your family. Follow-up care is a key part of your treatment and safety. Be sure to make and go to all appointments, and call your doctor if you are having problems. It's also a good idea to know your test results and keep a list of the medicines you take. How can you care for yourself at home? · Do not eat too much sugar, fat, or fast foods. You can still have dessert and treats now and then. The goal is moderation. · Start small to improve your eating habits. Pay attention to portion sizes, drink less juice and soda pop, and eat more fruits and vegetables. ? Eat a healthy amount of food. A 3-ounce serving of meat, for example, is about the size of a deck of cards. Fill the rest of your plate with vegetables and whole grains. ? Limit the amount of soda and sports drinks you have every day. Drink more water when you are thirsty. ? Eat at least 5 servings of fruits and vegetables every day. It may seem like a lot, but it is not hard to reach this goal. A serving or helping is 1 piece of fruit, 1 cup of vegetables, or 2 cups of leafy, raw vegetables. Have an apple or some carrot sticks as an afternoon snack instead of a candy bar. Try to have fruits and/or vegetables at every meal. 
· Make exercise part of your daily routine. You may want to start with simple activities, such as walking, bicycling, or slow swimming. Try to be active 30 to 60 minutes every day.  You do not need to do all 30 to 60 minutes all at once. For example, you can exercise 3 times a day for 10 or 20 minutes. Moderate exercise is safe for most people, but it is always a good idea to talk to your doctor before starting an exercise program. 
· Keep moving. [employer] the lawn, work in the garden, or Escape the City. Take the stairs instead of the elevator at work. · If you smoke, quit. People who smoke have an increased risk for heart attack, stroke, cancer, and other lung illnesses. Quitting is hard, but there are ways to boost your chance of quitting tobacco for good. ? Use nicotine gum, patches, or lozenges. ? Ask your doctor about stop-smoking programs and medicines. ? Keep trying. In addition to reducing your risk of diseases in the future, you will notice some benefits soon after you stop using tobacco. If you have shortness of breath or asthma symptoms, they will likely get better within a few weeks after you quit. · Limit how much alcohol you drink. Moderate amounts of alcohol (up to 2 drinks a day for men, 1 drink a day for women) are okay. But drinking too much can lead to liver problems, high blood pressure, and other health problems. Family health If you have a family, there are many things you can do together to improve your health. · Eat meals together as a family as often as possible. · Eat healthy foods. This includes fruits, vegetables, lean meats and dairy, and whole grains. · Include your family in your fitness plan. Most people think of activities such as jogging or tennis as the way to fitness, but there are many ways you and your family can be more active. Anything that makes you breathe hard and gets your heart pumping is exercise. Here are some tips: 
? Walk to do errands or to take your child to school or the bus. 
? Go for a family bike ride after dinner instead of watching TV. Where can you learn more? Go to http://marie-jose.info/. Enter I016 in the search box to learn more about \"A Healthy Lifestyle: Care Instructions. \" Current as of: September 11, 2018 Content Version: 11.9 © 5957-9719 [employer]. Care instructions adapted under license by [employer] (which disclaims liability or warranty for this information). If you have questions about a medical condition or this instruction, always ask your healthcare professional. [place] [address] any warranty or liability for your use of this information. PRESCRIPTION REFILL POLICY [facility] Statement to Patients April 1, 2014 In an effort to ensure the large volume of patient prescription refills is processed in the most efficient and expeditious manner, we are asking our patients to assist us by calling your Pharmacy for all prescription refills, this will include also your  Mail Order Pharmacy. The pharmacy will contact our office electronically to continue the refill process. Please do not wait until the last minute to call your pharmacy. We need at least 48 hours (2days) to fill prescriptions. We also encourage you to call your pharmacy before going to  your prescription to make sure it is ready. With regard to controlled substance prescription refill requests (narcotic refills) that need to be picked up at our office, we ask your cooperation by providing us with at least 72 hours (3days) notice that you will need a refill. We will not refill narcotic prescription refill requests after 4:00pm on any weekday, Monday through Thursday, or after 2:00pm on Fridays, or on the weekends. We encourage everyone to explore another way of getting your prescription refill request processed using [employer], our patient web portal through our electronic medical record system.  [employer] is an efficient and effective way to communicate your medication request directly to the office and downloadable as an eric on your smart phone . [employer] also features a review functionality that allows you to view your medication list as well as leave messages for your physician. Are you ready to get connected? If so please review the attatched instructions or speak to any of our staff to get you set up right away! Thank you so much for your cooperation. Should you have any questions please contact our Practice Administrator. The Physicians and Staff,  [facility]

## 2019-02-20 ENCOUNTER — TELEPHONE (OUTPATIENT)
Dept: NEUROLOGY | Age: 74
End: 2019-02-20

## 2019-02-20 NOTE — TELEPHONE ENCOUNTER
Per Dr Anthony Mccarthy: I will defer to his surgeon. Shivani Vega they are taking him off of the Plavix 5 days prior to procedure, they may mitigate stroke risk by using Lovenox at therapeutic dosing in the interim.  ANTONIO     Discussed with patient who asked us to call surgeons office and relay the message. Spoke with Imelda Meeks at Dr Debbie Dumont office and gave above information. She verbalized understanding.

## 2019-02-20 NOTE — TELEPHONE ENCOUNTER
Dr Chase Sequeira office stated that they won't order lovenox that they leave it up to whomever is managing anticoagulation. If we feel that lovenox is needed, we have to order it. Forwarded message to provider.

## 2019-02-20 NOTE — TELEPHONE ENCOUNTER
----- Message from Billy Muñiz [id number] sent at 2/20/2019 12:20 PM EST -----  Regarding: Dr Terrence Ordaz refill  The pt was seen on 2/14 and told he should take \"lovenox\"; his surgeon stated it needed to be prescribed by Dr Edna Marroquin, so he is requesting a callback regarding this.         Best contact number is [phone]

## 2019-02-21 RX ORDER — ENOXAPARIN SODIUM 100 MG/ML
80 INJECTION SUBCUTANEOUS DAILY
Qty: 5 SYRINGE | Refills: 0 | Status: SHIPPED | OUTPATIENT
Start: 2019-02-21 | End: 2019-02-26

## 2019-03-06 ENCOUNTER — TELEPHONE (OUTPATIENT)
Dept: INTERNAL MEDICINE CLINIC | Age: 74
End: 2019-03-06

## 2019-03-06 NOTE — TELEPHONE ENCOUNTER
Returned page Tuesday march 5 at 21:00. Spoke with pts wife. Pt [facility] surgery a [facility] ago [facility], and has had fevers almost daily since. Was 101.0 at that time. No dysuria or other uti complaints. No cough or sob. Advised him to take some tylenol. If fevers persist, should contact his orthopedic sx who did his surgery.

## 2019-04-08 RX ORDER — CLOPIDOGREL BISULFATE 75 MG/1
TABLET ORAL
Qty: 90 TAB | Refills: 1 | Status: SHIPPED | OUTPATIENT
Start: 2019-04-08 | End: 2019-09-30 | Stop reason: SDUPTHER

## 2019-04-12 ENCOUNTER — TELEPHONE (OUTPATIENT)
Dept: INTERNAL MEDICINE CLINIC | Age: 74
End: 2019-04-12

## 2019-04-12 NOTE — TELEPHONE ENCOUNTER
Called and spoke with Ivanna Oswald. Plavix is being prescribed by Dr. Holly Burns. Dr. Quirino Shah has also not seen pt since 11/2018. Ivanna Oswald verbalized understanding of information discussed w/ no further questions at this time.

## 2019-04-12 NOTE — TELEPHONE ENCOUNTER
Amy//Dr. Simba Schmitt off/[facility] practice, needs a call back today Asap to discuss patient's medication, Plavix, that has upcoming surgery this Wed, 4/17/19. Laura Cotton states Dr. Rosalinda Win needs to know if patient comes off of Plavix & needs to be taking Lovenox & if so what dosage & if Dr. Jennifer Nava will prescribe. Please call to advise asap.  Thank you

## 2019-04-16 ENCOUNTER — TELEPHONE (OUTPATIENT)
Dept: NEUROLOGY | Age: 74
End: 2019-04-16

## 2019-04-16 NOTE — TELEPHONE ENCOUNTER
Pan White NP calling on behalf of Dr. Casie Sharif -- Patient is having surgery tomorrow 04/17/19, at [place] [id number] and Dr. Tomasa Ormond (surgeon) is requesting patient's last office visit notes. Patient was last seen here with Dr. Jazmin Archuleta 02/14/19 -- please fax over the office note from that date.     Fax to Pan White NP -- fax # [phone]    Best call back # [phone]

## 2019-05-27 RX ORDER — SIMVASTATIN 40 MG/1
TABLET, FILM COATED ORAL
Qty: 90 TAB | Refills: 3 | Status: SHIPPED | OUTPATIENT
Start: 2019-05-27 | End: 2020-05-26

## 2019-08-18 RX ORDER — LISINOPRIL 5 MG/1
TABLET ORAL
Qty: 30 TAB | Refills: 11 | Status: SHIPPED | OUTPATIENT
Start: 2019-08-18 | End: 2020-08-03

## 2019-09-08 RX ORDER — TADALAFIL 2.5 MG/1
TABLET ORAL
Qty: 15 TAB | Refills: 3 | Status: SHIPPED | OUTPATIENT
Start: 2019-09-08 | End: 2020-09-21

## 2019-10-03 ENCOUNTER — TELEPHONE (OUTPATIENT)
Dept: NEUROLOGY | Age: 74
End: 2019-10-03

## 2019-10-03 NOTE — TELEPHONE ENCOUNTER
----- Message from Ady Archuleta sent at 10/3/2019 12:16 PM EDT -----  Regarding: Dr. Amara Kong first and last name:      Reason for call: requesting a call back in regards to some questions he have      Callback required yes/no and why: yes      Best contact number(s):  [phone]      Details to clarify the request:      Ady Archuleta

## 2019-10-04 NOTE — TELEPHONE ENCOUNTER
Spoke with patient, he states he is having a colonoscopy in December. GI specialist suggested stopping Plavix and going on Lovenox in place of that medication. He would like a follow up call in a few weeks when George Sofia returns.

## 2019-10-11 RX ORDER — CLOPIDOGREL BISULFATE 75 MG/1
TABLET ORAL
Qty: 90 TAB | Refills: 1 | Status: SHIPPED | OUTPATIENT
Start: 2019-10-11 | End: 2020-03-30

## 2019-10-11 NOTE — TELEPHONE ENCOUNTER
Requested Prescriptions     Pending Prescriptions Disp Refills    clopidogrel (PLAVIX) 75 mg tab [Pharmacy Med Name: CLOPIDOGREL 75 MG TABLET] 90 Tab 1     Sig: TAKE 1 TABLET BY MOUTH EVERY DAY

## 2019-11-15 ENCOUNTER — TELEPHONE (OUTPATIENT)
Dept: NEUROLOGY | Age: 74
End: 2019-11-15

## 2019-11-15 NOTE — TELEPHONE ENCOUNTER
Spoke with patient, he states he is having a colonoscopy on December 4th. He needs to know how long to be off his plavix.

## 2019-11-15 NOTE — TELEPHONE ENCOUNTER
Spoke with patient, informed him per -Time off of Plavix is up to his GI specialist performing procedure.  Typically this is 3-5 days, the shorter the duration off the better. Stroke risk remains high due to stroke risk factors including HTN, HPL, large artery atherosclerosis and previous stroke. Ceci Foley limiting time off of antiplatelet therapy maurisio-operatively. He wanted to know if a letter could be provided to [facility], also if Lovenox would be appropriate while off of Plavix.

## 2019-11-18 ENCOUNTER — DOCUMENTATION ONLY (OUTPATIENT)
Dept: NEUROLOGY | Age: 74
End: 2019-11-18

## 2019-11-20 ENCOUNTER — TELEPHONE (OUTPATIENT)
Dept: NEUROLOGY | Age: 74
End: 2019-11-20

## 2019-11-20 NOTE — TELEPHONE ENCOUNTER
Marie from [facility] calling to ask a question about the Plavix, said they received a letter but it was very vague, still not sure if they need to bridge with lovenox (?). Please call back.

## 2019-11-26 NOTE — TELEPHONE ENCOUNTER
Spoke with Heather Huerta, informed them  wanted me to fax over paper work from when patient had ortho procedure in Feb 2019 which explains her recommendations/risks. Requested notes be faxed to [phone]. Notes faxed.

## 2019-11-29 ENCOUNTER — TELEPHONE (OUTPATIENT)
Dept: NEUROLOGY | Age: 74
End: 2019-11-29

## 2019-11-29 NOTE — TELEPHONE ENCOUNTER
----- Message from Jarod Carolina sent at 11/27/2019  3:10 PM EST -----  Regarding: Dr. Merced Borges Message/Vendor Calls    Caller's first and last name:      Reason for call: Verification to stop taking Rx \"Plavix\" for an upcoming colonoscopy 12/4/19.        Callback required yes/no and why: yes      Best contact number(s):  [phone]    Details to clarify the request:      Jarod Carolina

## 2019-11-29 NOTE — TELEPHONE ENCOUNTER
----- Message from Jarod Carolina sent at 11/29/2019  9:47 AM EST -----  Regarding: Dr. Merced Borges Message/Vendor Calls    Caller's first and last name:      Reason for call: Pt has colonoscopy scheduled for 12/4/19 and has to stop taking Rx \"Plavix\" and has questions regarding \"Lovenox\"      Callback required yes/no and why: yes      Best contact number(s): [phone]      Details to clarify the request: second attempt to contact provider      Jarod Carolina

## 2019-11-30 ENCOUNTER — DOCUMENTATION ONLY (OUTPATIENT)
Dept: NEUROLOGY | Age: 74
End: 2019-11-30

## 2019-11-30 NOTE — PROGRESS NOTES
Patient called to check regarding antiplatelet therapy. He has been advised by GI to stop Plavix for 4 days prior to colonoscopy. Advised that he may stop and if possible, take/ baby aspirin during this time. Was also asking about Lovenox bridging but discussed that anticoagulation not indicated for intracranial atherosclerotic disease and does not replace antiplatelet therapy.

## 2019-12-02 NOTE — TELEPHONE ENCOUNTER
Spoke with patient, he states he has been off Plavix on 12/1 with the recommendation of Michelle Jimenez whom he spoke with on 11/29. And then to take 4 pills of Plavix right after the procedure. He wanted  to be aware.

## 2019-12-02 NOTE — TELEPHONE ENCOUNTER
Message from Candido 59, \"Colonoscopy on 12/4, need to know about the meds I am taking.  pls call\"

## 2019-12-02 NOTE — TELEPHONE ENCOUNTER
----- Message from Billy Muñiz 290Parker sent at 11/29/2019  3:50 PM EST -----  Regarding: dr liz/ telephone  Patient return call    Caller's first and last name and relationship (if not the patient): pt      Best contact number(s): [phone]      Whose call is being returned: nurse      Details to clarify the request:      Billy [email or web address]

## 2019-12-03 ENCOUNTER — TELEPHONE (OUTPATIENT)
Dept: NEUROLOGY | Age: 74
End: 2019-12-03

## 2019-12-04 ENCOUNTER — HOSPITAL ENCOUNTER (OUTPATIENT)
Age: 74
Setting detail: OUTPATIENT SURGERY
Discharge: HOME | End: 2019-12-04
Attending: INTERNAL MEDICINE | Admitting: INTERNAL MEDICINE
Payer: MEDICARE

## 2019-12-04 ENCOUNTER — ANESTHESIA EVENT (OUTPATIENT)
Dept: ENDOSCOPY | Age: 74
End: 2019-12-04
Payer: MEDICARE

## 2019-12-04 ENCOUNTER — ANESTHESIA (OUTPATIENT)
Dept: ENDOSCOPY | Age: 74
End: 2019-12-04
Payer: MEDICARE

## 2019-12-04 VITALS
HEIGHT: 69 IN | WEIGHT: 176 LBS | BODY MASS INDEX: 26.07 KG/M2 | SYSTOLIC BLOOD PRESSURE: 129 MMHG | OXYGEN SATURATION: 99 % | TEMPERATURE: 98.5 F | RESPIRATION RATE: 14 BRPM | HEART RATE: 64 BPM | DIASTOLIC BLOOD PRESSURE: 82 MMHG

## 2019-12-04 PROCEDURE — 74011000250 HC RX REV CODE- 250: Performed by: NURSE ANESTHETIST, CERTIFIED REGISTERED

## 2019-12-04 PROCEDURE — 76060000032 HC ANESTHESIA 0.5 TO 1 HR: Performed by: INTERNAL MEDICINE

## 2019-12-04 PROCEDURE — 74011250636 HC RX REV CODE- 250/636: Performed by: NURSE ANESTHETIST, CERTIFIED REGISTERED

## 2019-12-04 PROCEDURE — 76040000007: Performed by: INTERNAL MEDICINE

## 2019-12-04 RX ORDER — EPINEPHRINE 0.1 MG/ML
1 INJECTION INTRACARDIAC; INTRAVENOUS
Status: DISCONTINUED | OUTPATIENT
Start: 2019-12-04 | End: 2019-12-04 | Stop reason: HOSPADM

## 2019-12-04 RX ORDER — DEXTROMETHORPHAN/PSEUDOEPHED 2.5-7.5/.8
1.2 DROPS ORAL
Status: DISCONTINUED | OUTPATIENT
Start: 2019-12-04 | End: 2019-12-04 | Stop reason: HOSPADM

## 2019-12-04 RX ORDER — NALOXONE HYDROCHLORIDE 0.4 MG/ML
0.4 INJECTION, SOLUTION INTRAMUSCULAR; INTRAVENOUS; SUBCUTANEOUS
Status: DISCONTINUED | OUTPATIENT
Start: 2019-12-04 | End: 2019-12-04 | Stop reason: HOSPADM

## 2019-12-04 RX ORDER — SODIUM CHLORIDE 0.9 % (FLUSH) 0.9 %
5-40 SYRINGE (ML) INJECTION AS NEEDED
Status: DISCONTINUED | OUTPATIENT
Start: 2019-12-04 | End: 2019-12-04 | Stop reason: HOSPADM

## 2019-12-04 RX ORDER — FLUMAZENIL 0.1 MG/ML
0.2 INJECTION INTRAVENOUS
Status: DISCONTINUED | OUTPATIENT
Start: 2019-12-04 | End: 2019-12-04 | Stop reason: HOSPADM

## 2019-12-04 RX ORDER — SODIUM CHLORIDE 9 MG/ML
50 INJECTION, SOLUTION INTRAVENOUS CONTINUOUS
Status: DISCONTINUED | OUTPATIENT
Start: 2019-12-04 | End: 2019-12-04 | Stop reason: HOSPADM

## 2019-12-04 RX ORDER — PROPOFOL 10 MG/ML
INJECTION, EMULSION INTRAVENOUS AS NEEDED
Status: DISCONTINUED | OUTPATIENT
Start: 2019-12-04 | End: 2019-12-04 | Stop reason: HOSPADM

## 2019-12-04 RX ORDER — SODIUM CHLORIDE 0.9 % (FLUSH) 0.9 %
5-40 SYRINGE (ML) INJECTION EVERY 8 HOURS
Status: DISCONTINUED | OUTPATIENT
Start: 2019-12-04 | End: 2019-12-04 | Stop reason: HOSPADM

## 2019-12-04 RX ORDER — SODIUM CHLORIDE 9 MG/ML
INJECTION, SOLUTION INTRAVENOUS
Status: DISCONTINUED | OUTPATIENT
Start: 2019-12-04 | End: 2019-12-04 | Stop reason: HOSPADM

## 2019-12-04 RX ORDER — LIDOCAINE HYDROCHLORIDE 20 MG/ML
INJECTION, SOLUTION EPIDURAL; INFILTRATION; INTRACAUDAL; PERINEURAL AS NEEDED
Status: DISCONTINUED | OUTPATIENT
Start: 2019-12-04 | End: 2019-12-04 | Stop reason: HOSPADM

## 2019-12-04 RX ORDER — ATROPINE SULFATE 0.1 MG/ML
0.5 INJECTION INTRAVENOUS
Status: DISCONTINUED | OUTPATIENT
Start: 2019-12-04 | End: 2019-12-04 | Stop reason: HOSPADM

## 2019-12-04 RX ADMIN — LIDOCAINE HYDROCHLORIDE 40 MG: 20 INJECTION, SOLUTION EPIDURAL; INFILTRATION; INTRACAUDAL; PERINEURAL at 09:10

## 2019-12-04 RX ADMIN — PROPOFOL 50 MG: 10 INJECTION, EMULSION INTRAVENOUS at 09:16

## 2019-12-04 RX ADMIN — SODIUM CHLORIDE: 900 INJECTION, SOLUTION INTRAVENOUS at 08:50

## 2019-12-04 RX ADMIN — PROPOFOL 100 MG: 10 INJECTION, EMULSION INTRAVENOUS at 09:10

## 2019-12-04 RX ADMIN — PROPOFOL 50 MG: 10 INJECTION, EMULSION INTRAVENOUS at 09:12

## 2019-12-04 RX ADMIN — PROPOFOL 50 MG: 10 INJECTION, EMULSION INTRAVENOUS at 09:23

## 2019-12-04 NOTE — ROUTINE PROCESS
Gely Izquierdo 1945 
[id number] Situation: 
Verbal report received from: [facility] Procedure: Procedure(s): 
COLONOSCOPY Background: 
 
Preoperative diagnosis: FAMILY HISTORY OF COLON CANCER Postoperative diagnosis: 1.diverticulosis :  Dr. Eric Esposito Assistant(s): Endoscopy Technician-1: Joanie Rodriguez Endoscopy RN-1: Myra Saliva Specimens: * No specimens in log * H. Pylori  no Assessment: 
Intra-procedure medications Anesthesia gave intra-procedure sedation and medications, see anesthesia flow sheet yes Intravenous fluids: NS@ Victorine Kobus Vital signs stable Abdominal assessment: round and soft Recommendation: 
Discharge patient per MD order. Family or Friend Permission to share finding with family or friend yes

## 2019-12-04 NOTE — ANESTHESIA POSTPROCEDURE EVALUATION
Post-Anesthesia Evaluation and Assessment    Patient: Sandra Contreras MRN: [id number]  SSN: [id number]    YOB: 1945  Age: 69 y.o. Sex: male      I have evaluated the patient and they are stable and ready for discharge from the PACU. Cardiovascular Function/Vital Signs  Visit Vitals  /82   Pulse 64   Temp 36.9 °C (98.5 °F)   Resp 14   Ht 5' 9\" (1.753 m)   Wt 79.8 kg (176 lb)   SpO2 99%   BMI 25.99 kg/m²       Patient is status post MAC anesthesia for Procedure(s):  COLONOSCOPY. Nausea/Vomiting: None    Postoperative hydration reviewed and adequate. Pain:  Pain Scale 1: Numeric (0 - 10) (12/04/19 1004)  Pain Intensity 1: 0 (12/04/19 1004)   Managed    Neurological Status: At baseline    Mental Status, Level of Consciousness: Alert and  oriented to person, place, and time    Pulmonary Status:   O2 Device: Room air (12/04/19 1004)   Adequate oxygenation and airway patent    Complications related to anesthesia: None    Post-anesthesia assessment completed. No concerns    Signed By: Prudence Cardona MD     December 4, 2019              Procedure(s):  COLONOSCOPY. MAC    <BSHSIANPOST>    Vitals Value Taken Time   BP 0/0 12/4/2019 10:11 AM   Temp     Pulse 0 12/4/2019 10:11 AM   Resp 0 12/4/2019 10:14 AM   SpO2 99 % 12/4/2019 10:04 AM   Vitals shown include unvalidated device data.

## 2019-12-04 NOTE — H&P
[address]  [address] [facility], [address] [place] [address]                 Georgette Sorto is a  69 y.o.  male who presents with a family history of colon cancer for screening .         Past Medical History:   Diagnosis Date    Degenerative arthritis of knee 12/7/2009    Dermatitis,nonspecific 12/7/2009    Dyslipidemia 12/7/2009    Glaucoma 12/7/2009    Hypertension     Stroke [facility])      Past Surgical History:   Procedure Laterality Date    HX COLONOSCOPY      HX ORTHOPAEDIC Left 04/2019    total shoulder replacement    HX SKIN BIOPSY  04/15    on forehead, pre-melanoma     Allergies   Allergen Reactions    Pravachol [Pravastatin] Rash    Sulfa (Sulfonamide Antibiotics) Other (comments)     \"fever; bleeding\"     Current Facility-Administered Medications   Medication Dose Route Frequency Provider Last Rate Last Dose    0.9% sodium chloride infusion  50 mL/hr IntraVENous CONTINUOUS Brian Jamison MD        sodium chloride (NS) flush 5-40 mL  5-40 mL IntraVENous Q8H Brian Jamison MD        sodium chloride (NS) flush 5-40 mL  5-40 mL IntraVENous PRN Brian Jamison MD        naloxone [facility]) injection 0.4 mg  0.4 mg IntraVENous Multiple Brian Jamison MD        flumazenil (ROMAZICON) 0.1 mg/mL injection 0.2 mg  0.2 mg IntraVENous Multiple Brian Jamison MD        simethicone (MYLICON) 55DS/7.1BB oral drops 80 mg  1.2 mL Oral Stew Jamison MD        atropine injection 0.5 mg  0.5 mg IntraVENous ONCE PRN Brian Jamison MD        EPINEPHrine (ADRENALIN) 0.1 mg/mL syringe 1 mg  1 mg Endoscopically ONCE PRN Brian Jamison MD         Facility-Administered Medications Ordered in Other Encounters   Medication Dose Route Frequency Provider Last Rate Last Dose    0.9% sodium chloride infusion   IntraVENous CONTINUOUS Leonie Gardner CRNA           Visit Vitals  /78   Pulse 70   Temp 98.5 °F (36.9 °C)   Resp 18   Ht 5' 9\" (1.753 m)   Wt 79.8 kg (176 lb) SpO2 99%   BMI 25.99 kg/m²           PHYSICAL EXAM:  General: WD, WN. Alert, cooperative, no acute distress    HEENT: NC, Atraumatic. PERRLA, EOMI. Anicteric sclerae. Mallampati score 2  Lungs:  CTA Bilaterally. No Wheezing/Rhonchi/Rales. Heart:  Regular  rhythm,  No murmur (), No Rubs, No Gallops  Abdomen: Soft, Non distended, Non tender.  +Bowel sounds, no HSM  Extremities: No c/c/e  Neurologic:  CN 2-12 gi, Alert and oriented X 3. No acute neurological distress   Psych:   Good insight. Not anxious nor agitated. Plan:   Endoscopic procedure with MAC.     Sharon Conner MD  12/4/2019  9:07 AM

## 2019-12-04 NOTE — ANESTHESIA PREPROCEDURE EVALUATION
Relevant Problems   No relevant active problems       Anesthetic History   No history of anesthetic complications            Review of Systems / Medical History  Patient summary reviewed, nursing notes reviewed and pertinent labs reviewed    Pulmonary  Within defined limits                 Neuro/Psych       CVA       Cardiovascular    Hypertension: well controlled              Exercise tolerance: >4 METS     GI/Hepatic/Renal  Within defined limits              Endo/Other  Within defined limits           Other Findings              Physical Exam    Airway  Mallampati: II  TM Distance: > 6 cm  Neck ROM: normal range of motion   Mouth opening: Normal     Cardiovascular  Regular rate and rhythm,  S1 and S2 normal,  no murmur, click, rub, or gallop             Dental  No notable dental hx       Pulmonary  Breath sounds clear to auscultation               Abdominal  GI exam deferred       Other Findings            Anesthetic Plan    ASA: 3  Anesthesia type: MAC          Induction: Intravenous  Anesthetic plan and risks discussed with: Patient

## 2019-12-04 NOTE — PERIOP NOTES

## 2019-12-04 NOTE — DISCHARGE INSTRUCTIONS
[address]  [address] [facility], [address] [place] [address]          Queenie Izaguirre  [id number]  1945    COLON DISCHARGE INSTRUCTIONS    DISCOMFORT:  Redness at IV site- apply warm compress to area; if redness or soreness persist- contact your physician  There may be a slight amount of blood passed from the rectum  Gaseous discomfort- walking, belching will help relieve any discomfort  You may not operate a vehicle for 12 hours  You may not engage in an occupation involving machinery or appliances for rest of today  You may not drink alcoholic beverages for at least 12 hours  Avoid making any critical decisions for at least 24 hour  DIET:   Regular diet. - however -  remember your colon is empty and a heavy meal will produce gas. Avoid these foods:  vegetables, fried / greasy foods, carbonated drinks for today         ACTIVITY:  You may resume your normal daily activities it is recommended that you spend the remainder of the day resting -  avoid any strenuous activity. CALL M.D. ANY SIGN OF:   Increasing pain, nausea, vomiting  Abdominal distension (swelling)  New increased bleeding (oral or rectal)  Fever (chills)  Pain in chest area  Bloody discharge from nose or mouth  Shortness of breath     Follow-up Instructions:   Call Dr. Anil Lantigua for any questions or problems.    Telephone # [phone]  Should have a repeat colonoscopy in 5 years    Impression:  1.diverticulosis

## 2020-01-02 ENCOUNTER — TELEPHONE (OUTPATIENT)
Dept: INTERNAL MEDICINE CLINIC | Age: 75
End: 2020-01-02

## 2020-01-02 NOTE — TELEPHONE ENCOUNTER
Called, spoke to pt. Two identifiers confirmed. Appointment scheduled for 1/22 @ 11 with Dr. Yo Dubose.   Pt verbalized understanding of information discussed w/ no further questions at this time.

## 2020-01-02 NOTE — TELEPHONE ENCOUNTER
Patient last seen November 2018 states he needs a call back to get a Surgical Clearance appt/CPE for his upcoming Cataract surgery on 1/27/20. Please call to discuss as no availability.  Thank you

## 2020-01-22 ENCOUNTER — OFFICE VISIT (OUTPATIENT)
Dept: INTERNAL MEDICINE CLINIC | Age: 75
End: 2020-01-22

## 2020-01-22 ENCOUNTER — HOSPITAL ENCOUNTER (OUTPATIENT)
Dept: LAB | Age: 75
Discharge: HOME | End: 2020-01-22
Payer: MEDICARE

## 2020-01-22 VITALS
TEMPERATURE: 97.3 F | SYSTOLIC BLOOD PRESSURE: 147 MMHG | RESPIRATION RATE: 16 BRPM | OXYGEN SATURATION: 100 % | DIASTOLIC BLOOD PRESSURE: 72 MMHG | WEIGHT: 178 LBS | HEART RATE: 54 BPM | BODY MASS INDEX: 26.36 KG/M2 | HEIGHT: 69 IN

## 2020-01-22 DIAGNOSIS — I10 ESSENTIAL HYPERTENSION: ICD-10-CM

## 2020-01-22 DIAGNOSIS — Z01.818 PREOP EXAMINATION: ICD-10-CM

## 2020-01-22 DIAGNOSIS — K21.9 GASTROESOPHAGEAL REFLUX DISEASE, ESOPHAGITIS PRESENCE NOT SPECIFIED: ICD-10-CM

## 2020-01-22 DIAGNOSIS — H25.9 AGE-RELATED CATARACT OF BOTH EYES, UNSPECIFIED AGE-RELATED CATARACT TYPE: ICD-10-CM

## 2020-01-22 DIAGNOSIS — Z00.00 MEDICARE ANNUAL WELLNESS VISIT, SUBSEQUENT: Primary | ICD-10-CM

## 2020-01-22 DIAGNOSIS — E78.2 MIXED HYPERLIPIDEMIA: ICD-10-CM

## 2020-01-22 DIAGNOSIS — I63.9 CEREBROVASCULAR ACCIDENT (CVA), UNSPECIFIED MECHANISM (HCC): ICD-10-CM

## 2020-01-22 DIAGNOSIS — Z99.89 OSA ON CPAP: ICD-10-CM

## 2020-01-22 DIAGNOSIS — G47.33 OSA ON CPAP: ICD-10-CM

## 2020-01-22 PROCEDURE — 83036 HEMOGLOBIN GLYCOSYLATED A1C: CPT

## 2020-01-22 PROCEDURE — 84443 ASSAY THYROID STIM HORMONE: CPT

## 2020-01-22 PROCEDURE — 36415 COLL VENOUS BLD VENIPUNCTURE: CPT

## 2020-01-22 PROCEDURE — 84153 ASSAY OF PSA TOTAL: CPT

## 2020-01-22 PROCEDURE — 80061 LIPID PANEL: CPT

## 2020-01-22 PROCEDURE — 80053 COMPREHEN METABOLIC PANEL: CPT

## 2020-01-22 PROCEDURE — 85025 COMPLETE CBC W/AUTO DIFF WBC: CPT

## 2020-01-22 RX ORDER — FAMOTIDINE 10 MG/1
10 TABLET ORAL
Qty: 60 TAB | Refills: 3 | Status: SHIPPED | OUTPATIENT
Start: 2020-01-22

## 2020-01-22 NOTE — PROGRESS NOTES
Rigo Renteria is a 69 y.o. male who presents for evaluation of awv, and preop exam for upcoming bilateral cataract removal.  Last seen by me nov 8, 2018. Has overall done well, though fell in [place], and needed to have left shoulder replaced. It then became dislocated, and he had sx again. Has done well since then. Stopped using his cpap though, as he was unable to sleep after having his shoulder sx. He would like to resume his cpap, but his device was taken back. Has been having some increased gerd and cough lately. His wife is also concerned that he does not hear as well.       ROS:  Constitutional: negative for fevers, chills, anorexia and weight loss  Eyes:   negative for visual disturbance and irritation  ENT:   negative for tinnitus,sore throat,nasal congestion,ear pain,hoarseness  Respiratory:  negative for cough, hemoptysis, dyspnea,wheezing  CV:   negative for chest pain, palpitations, lower extremity edema  GI:   negative for nausea, vomiting, diarrhea, abdominal pain,melena  Genitourinary: negative for frequency, dysuria and hematuria  Musculoskel: negative for myalgias, arthralgias, back pain, muscle weakness, joint pain  Neurological:  negative for headaches, dizziness, focal weakness, numbness  Psychiatric:     Negative for depression or anxiety      Past Medical History:   Diagnosis Date    Degenerative arthritis of knee 12/7/2009    Dermatitis,nonspecific 12/7/2009    Dyslipidemia 12/7/2009    Glaucoma 12/7/2009    Hypertension     Stroke [facility])        Past Surgical History:   Procedure Laterality Date    COLONOSCOPY Left 12/4/2019    COLONOSCOPY performed by Natalee Wright MD at [address] HX COLONOSCOPY      HX ORTHOPAEDIC Left 04/2019    total shoulder replacement    HX SKIN BIOPSY  04/15    on forehead, pre-melanoma       Family History   Problem Relation Age of Onset    Cancer Mother     Heart Disease Father        Social History     Socioeconomic History    Marital status:      Spouse name: Not on file    Number of children: Not on file    Years of education: Not on file    Highest education level: Not on file   Occupational History    Not on file   Social Needs    Financial resource strain: Not on file    Food insecurity:     Worry: Not on file     Inability: Not on file    Transportation needs:     Medical: Not on file     Non-medical: Not on file   Tobacco Use    Smoking status: Never Smoker    Smokeless tobacco: Never Used   Substance and Sexual Activity    Alcohol use: Yes     Comment: not to excess    Drug use: No    Sexual activity: Yes     Partners: Female   Lifestyle    Physical activity:     Days per week: Not on file     Minutes per session: Not on file    Stress: Not on file   Relationships    Social connections:     Talks on phone: Not on file     Gets together: Not on file     Attends Yarsanism service: Not on file     Active member of club or organization: Not on file     Attends meetings of clubs or organizations: Not on file     Relationship status: Not on file    Intimate partner violence:     Fear of current or ex partner: Not on file     Emotionally abused: Not on file     Physically abused: Not on file     Forced sexual activity: Not on file   Other Topics Concern    Not on file   Social History Narrative    Not on file            Visit Vitals  /72 (BP 1 Location: Right arm, BP Patient Position: Sitting)   Pulse (!) 54   Temp 97.3 °F (36.3 °C) (Oral)   Resp 16   Ht 5' 9\" (1.753 m)   Wt 178 lb (80.7 kg)   SpO2 100%   BMI 26.29 kg/m²       Physical Examination:   General - Well appearing male  HEENT - PERRL, TM no erythema/opacification, normal nasal turbinates, no oropharyngeal erythema or exudate, MMM  Neck - supple, no bruits, no thyroidomegaly, no lymphadenopathy  Pulm - clear to auscultation bilaterally  Cardio - RRR, normal S1 S2, no murmur  Abd - soft, nontender, no masses, no HSM  Extrem - no edema, +2 distal pulses  Neuro-  No focal deficits, CN intact     Assessment/Plan:    1.  preop exam for bilateral cataracts--no further workup needed. If they need him to stop his plavix for 7 days prior to sx, that is ok. 2.  cva with mild left sided weakness (2018)--still working with therapy. On asa and plavix, but can hold plavix for 7 days if needed for above sx. 3.  rodney--gave him referral again to see dr Alee Welsh, as he needs his device again  4.  htn--bit elevated today, on lisinopril  5. Occasional gerd--suggested pepcid. Check cbc  6.  hyperlipids--on zocor, check flp, cmp  7. Excessive ear wax, bilaterally--to be flushed out. 8.  Right knee osteoarthritis--has plans to have sx for that in the future    rtc one year, sooner if labs abn. Margarita Minaya III, DO            This is the Subsequent Medicare Annual Wellness Exam, performed 12 months or more after the Initial AWV or the last Subsequent AWV    I have reviewed the patient's medical history in detail and updated the computerized patient record.      History     Patient Active Problem List   Diagnosis Code    Dyslipidemia E78.5    Degenerative arthritis of knee M17.10    Glaucoma H40.9    Dermatitis,nonspecific L30.9    Conjunctivitis of left eye H10.9    Other male erectile dysfunction N52.8    Low vitamin D level R79.89    Stroke ([facility] [address] I63.9    Essential hypertension I10    Other sleep apnea G47.39     Past Medical History:   Diagnosis Date    Degenerative arthritis of knee 12/7/2009    Dermatitis,nonspecific 12/7/2009    Dyslipidemia 12/7/2009    Glaucoma 12/7/2009    Hypertension     Stroke [facility])       Past Surgical History:   Procedure Laterality Date    COLONOSCOPY Left 12/4/2019    COLONOSCOPY [facility] Sonny Rubio [facility] at [facility] ENDOSCOPY    HX COLONOSCOPY      HX ORTHOPAEDIC Left 04/2019    total shoulder replacement    HX SKIN BIOPSY  04/15    on forehead, pre-melanoma     Current Outpatient Medications   Medication Sig Dispense Refill    clopidogrel (PLAVIX) 75 mg tab TAKE 1 TABLET BY MOUTH EVERY DAY 90 Tab 1    tadalafil (CIALIS) 2.5 mg tablet TAKE 1 TABLET BY MOUTH AS NEEDED 15 Tab 3    lisinopril (PRINIVIL, ZESTRIL) 5 mg tablet TAKE 1 TABLET BY MOUTH EVERY DAY 30 Tab 11    simvastatin (ZOCOR) 40 mg tablet TAKE 1 TABLET BY MOUTH EVERY DAY IN THE EVENING 90 Tab 3    aspirin delayed-release 81 mg tablet Take 81 mg by mouth daily.  latanoprost (XALATAN) 0.005 % ophthalmic solution Administer 1 Drop to both eyes nightly. Dr. Natalee Austin [Pravastatin] Rash    Sulfa (Sulfonamide Antibiotics) Other (comments)     \"fever; bleeding\"       Family History   Problem Relation Age of Onset    Cancer Mother     Heart Disease Father      Social History     Tobacco Use    Smoking status: Never Smoker    Smokeless tobacco: Never Used   Substance Use Topics    Alcohol use: Yes     Comment: not to excess       Depression Risk Factor Screening:     3 most recent PHQ Screens 1/22/2020   Little interest or pleasure in doing things Not at all   Feeling down, depressed, irritable, or hopeless Not at all   Total Score PHQ 2 0       Alcohol Risk Factor Screening (MALE > 65): Do you average more 1 drink per night or more than 7 drinks a week: No    In the past three months have you have had more than 4 drinks containing alcohol on one occasion: No      Functional Ability and Level of Safety:   Hearing: Hearing is good. Activities of Daily Living: The home contains: no safety equipment. Patient does total self care    Ambulation: with mild difficulty. Needs to have knee replaced. Fall Risk:  Fall Risk Assessment, last 12 mths 1/22/2020   Able to walk? Yes   Fall in past 12 months? No   Fall with injury? -   Number of falls in past 12 months -   Fall Risk Score -       Abuse Screen:  Patient is not abused. Lives with wife of 55 years.     Cognitive Screening   Has your family/caregiver stated any concerns about your memory: no  Cognitive Screening: Normal - MMSE (Mini Mental Status Exam)    Patient Care Team   Patient Care Team:  Flavio Ochoa DO as PCP - General (Internal Medicine)  Flavio Ochoa DO as PCP - [facility]    Assessment/Plan   Education and counseling provided:  Are appropriate based on today's review and evaluation  End-of-Life planning (with patient's consent)  Pneumococcal Vaccine  Influenza Vaccine  Prostate cancer screening tests (PSA, covered annually)  Screening for glaucoma    Diagnoses and all orders for this visit:    1.  Medicare annual wellness visit, subsequent        Health Maintenance Due   Topic Date Due    MEDICARE YEARLY EXAM  04/24/2019    Influenza Age 5 to Adult  08/01/2019    Shingrix Vaccine Age 50> (2 of 2) 10/14/2019

## 2020-01-22 NOTE — PATIENT INSTRUCTIONS
Office Policies Phone calls/patient messages: Please allow up to 24 hours for someone in the office to contact you about your call or message. Be mindful your provider may be out of the office or your message may require further review. We encourage you to use [employer] for your messages as this is a faster, more efficient way to communicate with our office Medication Refills: 
         
Prescription medications require 48-72 business hours to process. We encourage you to use [employer] for your refills. For controlled medications: Please allow 72 business hours to process. Certain medications may require you to  a written prescription at our office. NO narcotic/controlled medications will be prescribed after 4pm Monday through Friday or on weekends Form/Paperwork Completion: 
         
Please note a $25 fee may incur for all paperwork for completed by our providers. We ask that you allow 7-10 business days. Pre-payment is due prior to picking up/faxing the completed form. You may also download your forms to [employer] to have your doctor print off. Medicare Wellness Visit, Male The best way to live healthy is to have a lifestyle where you eat a well-balanced diet, exercise regularly, limit alcohol use, and quit all forms of tobacco/nicotine, if applicable. Regular preventive services are another way to keep healthy. Preventive services (vaccines, screening tests, monitoring & exams) can help personalize your care plan, which helps you manage your own care. Screening tests can find health problems at the earliest stages, when they are easiest to treat. [facility] follows the current, evidence-based guidelines published by the [facility] [place] (USPSTF) when recommending preventive services for our patients.  Because we follow these guidelines, sometimes recommendations change over time as research supports it. (For example, a prostate screening blood test is no longer routinely recommended for men with no symptoms). Of course, you and your doctor may decide to screen more often for some diseases, based on your risk and co-morbidities (chronic disease you are already diagnosed with). Preventive services for you include: - Medicare offers their members a free annual wellness visit, which is time for you and your primary care provider to discuss and plan for your preventive service needs. Take advantage of this benefit every year! 
-All adults over age 72 should receive the recommended pneumonia vaccines. Current USPSTF guidelines recommend a series of two vaccines for the best pneumonia protection.  
-All adults should have a flu vaccine yearly and tetanus vaccine every 10 years. 
-All adults age 48 and older should receive the shingles vaccines (series of two vaccines). -All adults age 38-68 who are overweight should have a diabetes screening test once every three years.  
-Other screening tests & preventive services for persons with diabetes include: an eye exam to screen for diabetic retinopathy, a kidney function test, a foot exam, and stricter control over your cholesterol.  
-Cardiovascular screening for adults with routine risk involves an electrocardiogram (ECG) at intervals determined by the provider.  
-Colorectal cancer screening should be done for adults age 54-65 with no increased risk factors for colorectal cancer. There are a number of acceptable methods of screening for this type of cancer. Each test has its own benefits and drawbacks. Discuss with your provider what is most appropriate for you during your annual wellness visit.  The different tests include: colonoscopy (considered the best screening method), a fecal occult blood test, a fecal DNA test, and sigmoidoscopy. 
-All adults born between [facility] should be screened once for Hepatitis C. 
 -An Abdominal Aortic Aneurysm (AAA) Screening is recommended for men age 73-68 who has ever smoked in their lifetime. Here is a list of your current Health Maintenance items (your personalized list of preventive services) with a due date: 
Health Maintenance Due Topic Date Due  
 Annual Well Visit  04/24/2019  Flu Vaccine  08/01/2019  Shingles Vaccine (2 of 2) 10/14/2019 Cataracts: Care Instructions Your Care Instructions A cataract is a clouding of the lens of the eye. The lens focuses light on the retina at the back of the eye. Cataracts block some of the light and make it harder for you to see clearly. Cataracts often develop when you get older. Most cataracts grow slowly. At first, you may just need stronger glasses to help you see better. Later, if the cataracts grow and begin to seriously impair your vision, you can have surgery to remove them. Follow-up care is a key part of your treatment and safety. Be sure to make and go to all appointments, and call your doctor if you are having problems. It's also a good idea to know your test results and keep a list of the medicines you take. How can you care for yourself at home? · Move room lights and use window shades to avoid glare. · Use more lighting or higher-watt bulbs. · Use a magnifying glass for reading. Look for large-print books and other reading material to make reading more enjoyable. · Have your eyes checked regularly, and update your glasses when needed. · Wear sunglasses to block out harmful sunlight. Buy sunglasses that screen out ultraviolet A and B (UVA and UVB) rays. · Do not smoke. Smoking can make cataracts worse. If you need help quitting, talk to your doctor about stop-smoking programs and medicines. These can increase your chances of quitting for good. When should you call for help? Watch closely for changes in your health, and be sure to contact your doctor if: 
  · Your vision is getting worse.   · You have increasing trouble doing everyday tasks, like driving or reading the newspaper, because of your eyesight. Where can you learn more? Go to http://marie-jose.info/. Enter P916 in the search box to learn more about \"Cataracts: Care Instructions. \" Current as of: May 5, 2019 Content Version: 12.2 © 9783-1390 [employer]. Care instructions adapted under license by [employer] (which disclaims liability or warranty for this information). If you have questions about a medical condition or this instruction, always ask your healthcare professional. [address] any warranty or liability for your use of this information. Can use pepcid when needed for reflux.

## 2020-01-22 NOTE — PROGRESS NOTES
Reviewed record in preparation for visit and have obtained necessary documentation. Identified pt with two pt identifiers(name and ). Chief Complaint   Patient presents with   [address] Maintenance Due   Topic Date Due    MEDICARE YEARLY EXAM  2019    Influenza Age 5 to Adult  2019    Shingrix Vaccine Age 50> (2 of 2) 10/14/2019       Mr. Negra Marroquin has a reminder for a \"due or due soon\" health maintenance. I have asked that he discuss health maintenance topic(s) due with His  primary care provider. Coordination of Care Questionnaire:  :     1) Have you been to an emergency room, urgent care clinic since your last visit? no   Hospitalized since your last visit? no             2) Have you seen or consulted any other health care providers outside of [address] [place] [address] since your last visit? no  (Include any pap smears or colon screenings in this section.)    3) Do you have an Advance Directive on file? no    4) Are you interested in receiving information on Advance Directives? NO    Patient is accompanied by self I have received verbal consent from Otilio Boles to discuss any/all medical information while they are present in the room.

## 2020-01-23 ENCOUNTER — TELEPHONE (OUTPATIENT)
Dept: INTERNAL MEDICINE CLINIC | Age: 75
End: 2020-01-23

## 2020-01-23 LAB
ALBUMIN SERPL-MCNC: 4.8 G/DL (ref 3.7–4.7)
ALBUMIN/GLOB SERPL: 2.3 {RATIO} (ref 1.2–2.2)
ALP SERPL-CCNC: 82 IU/L (ref 39–117)
ALT SERPL-CCNC: 12 IU/L (ref 0–44)
AST SERPL-CCNC: 19 IU/L (ref 0–40)
BASOPHILS # BLD AUTO: 0 X10E3/UL (ref 0–0.2)
BASOPHILS NFR BLD AUTO: 0 %
BILIRUB SERPL-MCNC: 0.5 MG/DL (ref 0–1.2)
BUN SERPL-MCNC: 15 MG/DL (ref 8–27)
BUN/CREAT SERPL: 16 (ref 10–24)
CALCIUM SERPL-MCNC: 9.4 MG/DL (ref 8.6–10.2)
CHLORIDE SERPL-SCNC: 104 MMOL/L (ref 96–106)
CHOLEST SERPL-MCNC: 200 MG/DL (ref 100–199)
CO2 SERPL-SCNC: 24 MMOL/L (ref 20–29)
CREAT SERPL-MCNC: 0.93 MG/DL (ref 0.76–1.27)
EOSINOPHIL # BLD AUTO: 0.1 X10E3/UL (ref 0–0.4)
EOSINOPHIL NFR BLD AUTO: 2 %
ERYTHROCYTE [DISTWIDTH] IN BLOOD BY AUTOMATED COUNT: 13.8 % (ref 11.6–15.4)
EST. AVERAGE GLUCOSE BLD GHB EST-MCNC: 111 MG/DL
GLOBULIN SER CALC-MCNC: 2.1 G/DL (ref 1.5–4.5)
GLUCOSE SERPL-MCNC: 81 MG/DL (ref 65–99)
HBA1C MFR BLD: 5.5 % (ref 4.8–5.6)
HCT VFR BLD AUTO: 43.4 % (ref 37.5–51)
HDLC SERPL-MCNC: 79 MG/DL
HGB BLD-MCNC: 14.5 G/DL (ref 13–17.7)
IMM GRANULOCYTES # BLD AUTO: 0 X10E3/UL (ref 0–0.1)
IMM GRANULOCYTES NFR BLD AUTO: 0 %
LDLC SERPL CALC-MCNC: 109 MG/DL (ref 0–99)
LYMPHOCYTES # BLD AUTO: 0.9 X10E3/UL (ref 0.7–3.1)
LYMPHOCYTES NFR BLD AUTO: 19 %
MCH RBC QN AUTO: 29.4 PG (ref 26.6–33)
MCHC RBC AUTO-ENTMCNC: 33.4 G/DL (ref 31.5–35.7)
MCV RBC AUTO: 88 FL (ref 79–97)
MONOCYTES # BLD AUTO: 0.3 X10E3/UL (ref 0.1–0.9)
MONOCYTES NFR BLD AUTO: 7 %
NEUTROPHILS # BLD AUTO: 3.3 X10E3/UL (ref 1.4–7)
NEUTROPHILS NFR BLD AUTO: 72 %
PLATELET # BLD AUTO: 244 X10E3/UL (ref 150–450)
POTASSIUM SERPL-SCNC: 4.3 MMOL/L (ref 3.5–5.2)
PROT SERPL-MCNC: 6.9 G/DL (ref 6–8.5)
PSA SERPL-MCNC: 1.1 NG/ML (ref 0–4)
RBC # BLD AUTO: 4.94 X10E6/UL (ref 4.14–5.8)
SODIUM SERPL-SCNC: 144 MMOL/L (ref 134–144)
TRIGL SERPL-MCNC: 60 MG/DL (ref 0–149)
TSH SERPL DL<=0.005 MIU/L-ACNC: 1.47 UIU/ML (ref 0.45–4.5)
VLDLC SERPL CALC-MCNC: 12 MG/DL (ref 5–40)
WBC # BLD AUTO: 4.6 X10E3/UL (ref 3.4–10.8)

## 2020-01-23 NOTE — PROGRESS NOTES
Overall labs look good, however cholesterol levels are elevated now. Make sure he is taking zocor every day.

## 2020-01-23 NOTE — TELEPHONE ENCOUNTER
#[phone] pt states he is returning your call? Please call him. He does have an appt on 1-22-20 but it doesn't show a reminder call?

## 2020-01-24 NOTE — TELEPHONE ENCOUNTER
----- Message from Darryl Hennessy sent at 1/24/2020 10:26 AM EST -----  Regarding: Dr. Eli Rodríguez telephone  Patient return call    Caller's first and last name and relationship (if not the patient):      Best contact number(s):    [phone]  Whose call is being returned:    Rickie Zuleta   Details to clarify the request:      Darryl Hennessy

## 2020-03-30 RX ORDER — CLOPIDOGREL BISULFATE 75 MG/1
TABLET ORAL
Qty: 90 TAB | Refills: 1 | Status: SHIPPED | OUTPATIENT
Start: 2020-03-30 | End: 2020-10-09 | Stop reason: SDUPTHER

## 2020-05-26 RX ORDER — SIMVASTATIN 40 MG/1
TABLET, FILM COATED ORAL
Qty: 90 TAB | Refills: 3 | Status: SHIPPED | OUTPATIENT
Start: 2020-05-26 | End: 2021-05-26

## 2020-08-03 RX ORDER — LISINOPRIL 5 MG/1
TABLET ORAL
Qty: 30 TAB | Refills: 11 | Status: SHIPPED | OUTPATIENT
Start: 2020-08-03 | End: 2021-04-14 | Stop reason: SDUPTHER

## 2020-09-21 RX ORDER — TADALAFIL 2.5 MG/1
TABLET ORAL
Qty: 15 TAB | Refills: 3 | Status: SHIPPED | OUTPATIENT
Start: 2020-09-21

## 2020-10-09 ENCOUNTER — TELEPHONE (OUTPATIENT)
Dept: NEUROLOGY | Facility: CLINIC | Age: 75
End: 2020-10-09

## 2020-10-09 RX ORDER — CLOPIDOGREL BISULFATE 75 MG/1
TABLET ORAL
Qty: 30 TAB | Refills: 0 | Status: SHIPPED | OUTPATIENT
Start: 2020-10-09 | End: 2020-11-02 | Stop reason: SDUPTHER

## 2020-10-09 NOTE — TELEPHONE ENCOUNTER
Spoke with patient, informed him he was due for a follow up with . Scheduled for 11/2. He would like a refill on his Plavix, currently out.

## 2020-10-09 NOTE — TELEPHONE ENCOUNTER
----- Message from [place] sent at 10/9/2020  8:53 AM EDT -----  Regarding: Dr. Hoa Sanchez / refill  Caller (if not patient): self   Relationship of caller (if not patient): self   Best contact number(s): [phone]  Name of medication and dosage if known: \"clopidogrol 75mg\"   Is patient out of this medication (yes/no):  yes   Pharmacy name: [facility] listed in chart? (yes/no): yes   Pharmacy phone number: n/a   Date of last visit: 3/14/2019   Details to clarify the request:

## 2020-11-02 ENCOUNTER — OFFICE VISIT (OUTPATIENT)
Dept: NEUROLOGY | Age: 75
End: 2020-11-02
Payer: MEDICARE

## 2020-11-02 VITALS
OXYGEN SATURATION: 98 % | DIASTOLIC BLOOD PRESSURE: 74 MMHG | SYSTOLIC BLOOD PRESSURE: 136 MMHG | HEART RATE: 59 BPM | RESPIRATION RATE: 18 BRPM

## 2020-11-02 DIAGNOSIS — I67.9 INTRACRANIAL VASCULAR STENOSIS: ICD-10-CM

## 2020-11-02 DIAGNOSIS — I63.9 CEREBROVASCULAR ACCIDENT (CVA), UNSPECIFIED MECHANISM (HCC): Primary | ICD-10-CM

## 2020-11-02 DIAGNOSIS — E78.5 DYSLIPIDEMIA: ICD-10-CM

## 2020-11-02 DIAGNOSIS — I10 ESSENTIAL HYPERTENSION: ICD-10-CM

## 2020-11-02 PROCEDURE — G8754 DIAS BP LESS 90: HCPCS | Performed by: PSYCHIATRY & NEUROLOGY

## 2020-11-02 PROCEDURE — G8432 DEP SCR NOT DOC, RNG: HCPCS | Performed by: PSYCHIATRY & NEUROLOGY

## 2020-11-02 PROCEDURE — G8427 DOCREV CUR MEDS BY ELIG CLIN: HCPCS | Performed by: PSYCHIATRY & NEUROLOGY

## 2020-11-02 PROCEDURE — G8752 SYS BP LESS 140: HCPCS | Performed by: PSYCHIATRY & NEUROLOGY

## 2020-11-02 PROCEDURE — G8536 NO DOC ELDER MAL SCRN: HCPCS | Performed by: PSYCHIATRY & NEUROLOGY

## 2020-11-02 PROCEDURE — G8419 CALC BMI OUT NRM PARAM NOF/U: HCPCS | Performed by: PSYCHIATRY & NEUROLOGY

## 2020-11-02 PROCEDURE — 1101F PT FALLS ASSESS-DOCD LE1/YR: CPT | Performed by: PSYCHIATRY & NEUROLOGY

## 2020-11-02 PROCEDURE — 99214 OFFICE O/P EST MOD 30 MIN: CPT | Performed by: PSYCHIATRY & NEUROLOGY

## 2020-11-02 PROCEDURE — 3017F COLORECTAL CA SCREEN DOC REV: CPT | Performed by: PSYCHIATRY & NEUROLOGY

## 2020-11-02 RX ORDER — CLOPIDOGREL BISULFATE 75 MG/1
TABLET ORAL
Qty: 90 TAB | Refills: 3 | Status: SHIPPED | OUTPATIENT
Start: 2020-11-02 | End: 2021-10-14 | Stop reason: SDUPTHER

## 2020-11-02 RX ORDER — TIMOLOL MALEATE 5 MG/ML
1 SOLUTION/ DROPS OPHTHALMIC DAILY
COMMUNITY

## 2020-11-02 NOTE — PROGRESS NOTES
Neurology Clinic Follow up Note    Patient ID:  Giovanna Hernandez  [id number]  70 y.o.  1945      Mr. Dayanara Barnett is here for follow up today of  Chief Complaint   Patient presents with    Cerebrovascular Accident          Last Appointment With Me:  2/14/19 Hospital F/U Stroke      Interval History:   Pt returns for f/u of R thalamic stroke, moderate to severe stenosis of the R PCA/P2 segment. He admits to some difficulty writing with the left hand. Denies significant ataxia/incoordination. Balance is good. No reported falls. Compliant with ASA/Plavix and statin therapy for stroke prevention. He denies recurrence of stroke s/s since discharge. S/P L shoulder surgery 4/2019 without associated complications while off of antiplatelet therapy. PMHx/ PSHx/ FHx/ SHx:  Reviewed and unchanged previous visit. Past Medical History:   Diagnosis Date    Degenerative arthritis of knee 12/7/2009    Dermatitis,nonspecific 12/7/2009    Dyslipidemia 12/7/2009    Glaucoma 12/7/2009    Hypertension     Stroke ([facility] [address]        ROS:  Comprehensive review of systems negative except for as noted above. Objective:       Meds:  Current Outpatient Medications   Medication Sig Dispense Refill    timolol (TIMOPTIC) 0.5 % ophthalmic solution 1 Drop daily.  clopidogreL (PLAVIX) 75 mg tab TAKE 1 TABLET BY MOUTH EVERY DAY 30 Tab 0    tadalafiL (CIALIS) 2.5 mg tablet TAKE 1 TABLET BY MOUTH EVERY DAY AS NEEDED 15 Tab 3    lisinopriL (PRINIVIL, ZESTRIL) 5 mg tablet TAKE 1 TABLET BY MOUTH EVERY DAY 30 Tab 11    simvastatin (ZOCOR) 40 mg tablet TAKE 1 TABLET BY MOUTH EVERY DAY IN THE EVENING 90 Tab 3    famotidine (PEPCID) 10 mg tablet Take 1 Tab by mouth every twelve (12) hours as needed (gerd). 60 Tab 3    aspirin delayed-release 81 mg tablet Take 81 mg by mouth daily.          Exam:  Visit Vitals  /74   Pulse (!) 59   Resp 18   SpO2 98%     Neurological Exam:  Mental Status: Alert and oriented to person place and time   Speech: Fluent no aphasia or dysarthria   Cranial Nerves:   Intact visual fields.  Facial sensation is normal. Facial movement is symmetric.  Palate is midline.  Normal sternocleidomastoid strength. Tongue is midline. Hearing is intact bilaterally. Eyes: PERRL, EOM's full, no nystagmus, no ptosis. Motor:  5/5 throughout   Reflexes:   Deferred   Sensory:   Symmetrically intact  with no perceived deficits modalities involving small or large fibers. Gait:  Normal based, steady   Tremor:   No tremor noted. Cerebellar:  No ataxia          LABS  Results for orders placed or performed in visit on 01/22/20   CBC WITH AUTOMATED DIFF   Result Value Ref Range    WBC 4.6 3.4 - 10.8 x10E3/uL    RBC 4.94 4.14 - 5.80 x10E6/uL    HGB 14.5 13.0 - 17.7 g/dL    HCT 43.4 37.5 - 51.0 %    MCV 88 79 - 97 fL    MCH 29.4 26.6 - 33.0 pg    MCHC 33.4 31.5 - 35.7 g/dL    RDW 13.8 11.6 - 15.4 %    PLATELET 191 339 - 049 x10E3/uL    NEUTROPHILS 72 Not Estab. %    Lymphocytes 19 Not Estab. %    MONOCYTES 7 Not Estab. %    EOSINOPHILS 2 Not Estab. %    BASOPHILS 0 Not Estab. %    ABS. NEUTROPHILS 3.3 1.4 - 7.0 x10E3/uL    Abs Lymphocytes 0.9 0.7 - 3.1 x10E3/uL    ABS. MONOCYTES 0.3 0.1 - 0.9 x10E3/uL    ABS. EOSINOPHILS 0.1 0.0 - 0.4 x10E3/uL    ABS. BASOPHILS 0.0 0.0 - 0.2 x10E3/uL    IMMATURE GRANULOCYTES 0 Not Estab. %    ABS. IMM.  GRANS. 0.0 0.0 - 0.1 Q39K6/AS   METABOLIC PANEL, COMPREHENSIVE   Result Value Ref Range    Glucose 81 65 - 99 mg/dL    BUN 15 8 - 27 mg/dL    Creatinine 0.93 0.76 - 1.27 mg/dL    GFR est non-AA 82 >59 mL/min/1.73    GFR est AA 94 >59 mL/min/1.73    BUN/Creatinine ratio 16 10 - 24    Sodium 144 134 - 144 mmol/L    Potassium 4.3 3.5 - 5.2 mmol/L    Chloride 104 96 - 106 mmol/L    CO2 24 20 - 29 mmol/L    Calcium 9.4 8.6 - 10.2 mg/dL    Protein, total 6.9 6.0 - 8.5 g/dL    Albumin 4.8 (H) 3.7 - 4.7 g/dL    GLOBULIN, TOTAL 2.1 1.5 - 4.5 g/dL    A-G Ratio 2.3 (H) 1.2 - 2.2    Bilirubin, total 0.5 0.0 - 1.2 mg/dL    Alk. phosphatase 82 39 - 117 IU/L    AST (SGOT) 19 0 - 40 IU/L    ALT (SGPT) 12 0 - 44 IU/L   LIPID PANEL   Result Value Ref Range    Cholesterol, total 200 (H) 100 - 199 mg/dL    Triglyceride 60 0 - 149 mg/dL    HDL Cholesterol 79 >39 mg/dL    VLDL, calculated 12 5 - 40 mg/dL    LDL, calculated 109 (H) 0 - 99 mg/dL   THYROID CASCADE PROFILE   Result Value Ref Range    TSH 1.470 0.450 - 4.500 uIU/mL   HEMOGLOBIN A1C WITH EAG   Result Value Ref Range    Hemoglobin A1c 5.5 4.8 - 5.6 %    Estimated average glucose 111 mg/dL   PSA, DIAGNOSTIC (PROSTATE SPECIFIC AG)   Result Value Ref Range    Prostate Specific Ag 1.1 0.0 - 4.0 ng/mL       IMAGING:  MRI Results (most recent):  Results from Hospital Encounter encounter on 07/30/18   MRA NECK W CONT    Narrative INDICATION:  ATAXIA/ SLURRED SPEECH/ FALL/ AMS/ BLURRED VISION/ STROKE     COMPARISON:  7/30/18    TECHNIQUE:  MR imaging of the brain was performed with sagittal T1, axial T1,  T2, FLAIR, GRE, DWI/ADC; pre and post contrast multiplanar T1 utilizing 8 mL  gadolinium. 3-D time-of-flight MRA of the brain was performed. Multiplanar  reconstructions were obtained. Contrast enhanced coronal acquisition MRA of the  neck was performed. Multiplanar reconstructions were obtained. FINDINGS:      Ventricles:  Midline, no hydrocephalus. Brain Parenchyma/Brainstem:  Normal for age. Small to moderate-sized area of  acute infarction in the genuine of the right internal capsule/ventral thalamus. Intracranial Hemorrhage:  None. Basal Cisterns:  Normal.   Flow Voids:  Normal.  Post Contrast:  No abnormal parenchymal or meningeal enhancement. Additional Comments:  N/A. MRA NECK    Aortic Arch:  Visualized portions unremarkable. Carotid Arteries:  No significant stenosis by NASCET criteria. Vertebral Arteries:  Patent with no significant stenosis  Additional Comments:  N/A.     MRA HEAD    Posterior Circulation:  Vertebral and basilar arteries are patent. There is a  moderate to severe stenosis in the right P2 posterior cerebral artery segment  without flow limitation. Left posterior cerebral artery is patent. Anterior Circulation:  No flow limiting stenosis or occlusion. Additional Comments:  No evidence of aneurysm or vascular malformation. Impression IMPRESSION:  1. Small to moderate-sized area of acute infarction genu right internal  capsule/ventral thalamus. 2.  No flow limiting stenosis or arterial occlusion. Moderate to severe  stenosis right posterior cerebral artery. Assessment:     Encounter Diagnoses     ICD-10-CM ICD-9-CM   1. Cerebrovascular accident (CVA), unspecified mechanism ([facility] [address]  I63.9 434.91   2. Intracranial vascular stenosis  I67.9 437.9   3. Dyslipidemia  E78.5 272.4   4. Essential hypertension  I836.9   70year old LHM h/o HPL, glaucoma here for f/u of dizziness, dysarthria, gait instability 7/28/18.  MRI brain independently reviewed showing evidence of a moderate acute R thalamic infarct likely related to large artery atherosclerotic disease. MRA H/N with moderate to severe stenosis of the R PCA/P2 segment. TTE without shunt. He has done well post discharge from a stroke perspective without recurrence of focal neurologic deficits on dual antiplatelet therapy. Plan:   Cont. ASA 81mg + Plavix 75mg for stroke prevention  Cont. Statin therapy, goal LDL<70  SBP goal <140    Follow-up and Dispositions    · Return in about 1 year (around 11/2/2021).            Signed:  Waqas Drake, DO  11/2/2020

## 2021-01-14 ENCOUNTER — TELEPHONE (OUTPATIENT)
Dept: INTERNAL MEDICINE CLINIC | Age: 76
End: 2021-01-14

## 2021-01-14 NOTE — TELEPHONE ENCOUNTER
----- Message from [place] sent at 1/14/2021  3:46 PM EST -----  Regarding: Dr. Corral Tiera: [phone]  Appointment not available    Caller's first and last name and relationship to patient (if not the patient): Pt. Best contact number: [phone]      Preferred date and time: February, 2021. Scheduled appointment date and time: 4/14/21 at 9:00. Reason for appointment: AWV, last one was in January of 2020. Details to clarify the request: Please call if sooner appt becomes available.       Message from [facility]

## 2021-03-10 ENCOUNTER — TELEPHONE (OUTPATIENT)
Dept: INTERNAL MEDICINE CLINIC | Age: 76
End: 2021-03-10

## 2021-03-10 NOTE — TELEPHONE ENCOUNTER
#[phone]  Pt states he needs a new c pap machine. This is what is needed and needs to be sent to company called [employer]    FAX #[phone]     Pt would like a copy of the script that was sent to them so he can follow up with them. Please mail this copy pt ask.      Any questions, please call pt

## 2021-03-10 NOTE — TELEPHONE ENCOUNTER
Identified patient 2 identifiers verified. Patient was given the number to Dr. Ludivina Martinez the provider that originally prescribed the C_PAP.

## 2021-03-22 ENCOUNTER — VIRTUAL VISIT (OUTPATIENT)
Dept: SLEEP MEDICINE | Age: 76
End: 2021-03-22
Payer: MEDICARE

## 2021-03-22 DIAGNOSIS — G47.33 OSA (OBSTRUCTIVE SLEEP APNEA): Primary | ICD-10-CM

## 2021-03-22 DIAGNOSIS — Z86.73 H/O: STROKE: ICD-10-CM

## 2021-03-22 DIAGNOSIS — I10 ESSENTIAL HYPERTENSION: ICD-10-CM

## 2021-03-22 DIAGNOSIS — Z11.52 ENCOUNTER FOR SCREENING FOR COVID-19: ICD-10-CM

## 2021-03-22 PROCEDURE — G8756 NO BP MEASURE DOC: HCPCS | Performed by: INTERNAL MEDICINE

## 2021-03-22 PROCEDURE — 99214 OFFICE O/P EST MOD 30 MIN: CPT | Performed by: INTERNAL MEDICINE

## 2021-03-22 PROCEDURE — G8421 BMI NOT CALCULATED: HCPCS | Performed by: INTERNAL MEDICINE

## 2021-03-22 PROCEDURE — 1101F PT FALLS ASSESS-DOCD LE1/YR: CPT | Performed by: INTERNAL MEDICINE

## 2021-03-22 PROCEDURE — G8536 NO DOC ELDER MAL SCRN: HCPCS | Performed by: INTERNAL MEDICINE

## 2021-03-22 PROCEDURE — G8432 DEP SCR NOT DOC, RNG: HCPCS | Performed by: INTERNAL MEDICINE

## 2021-03-22 PROCEDURE — 3017F COLORECTAL CA SCREEN DOC REV: CPT | Performed by: INTERNAL MEDICINE

## 2021-03-22 PROCEDURE — G8427 DOCREV CUR MEDS BY ELIG CLIN: HCPCS | Performed by: INTERNAL MEDICINE

## 2021-03-22 NOTE — PROGRESS NOTES
[address] [facility] [place]. [place], [address]  Tel.  [phone]  Fax. [address] [facility] [address]  [place], [address] [facility]  Tel.  [phone]  Fax. [phone] [address]  Tel.  [phone]  Fax. [phone]       Moreno Sun is a 70y.o. year old male seen for evaluation of a sleep disorder. ASSESSMENT/PLAN:      ICD-10-CM ICD-9-CM    1. MAXINE (obstructive sleep apnea)  G47.33 327.23 SLEEP LAB (PAP TITRATION)   2. Essential hypertension  I10 401.9    3. H/O: stroke  Z86.73 V12.54    4. BMI 25.0-25.9,adult  Z68.25 V85.21    5. Encounter for screening for COVID-19  Z11.52  NOVEL CORONAVIRUS (COVID-19)      NOVEL CORONAVIRUS (COVID-19)       Patient has a history and examination consistent with the diagnosis of sleep apnea. Since his weight has not changed we will proceed with a PAP titration. * Discussed issues related to use of a travel device on a regular basis. Patient would like to have a Respironics device prescribed after testing is completed. * Sleep testing was ordered for evaluation. Orders Placed This Encounter    NOVEL CORONAVIRUS (COVID-19)     Standing Status:   Future     Number of Occurrences:   1     Standing Expiration Date:   6/22/2021     Scheduling Instructions:      1) Due to current limited availability of the COVID-19 PCR test, tests will be prioritized and may not be completed.              2) Order only if the test result will change clinical management or necessary for a return to mission-critical employment decision.              3) Print and instruct patient to adhere to [facility] home isolation program. (Link Above)              4) Set up or refer patient for a monitoring program.              5) Have patient sign up for and leverage [employer] (if not previously done).      Order Specific Question:   Status     Answer:   Asymptomatic/Surveillance(e.g. pre-op/pre-procedure/pre-delivery/transfer)     Order Specific Question:   Reason for Test     Answer:   Upcoming elective surgery/procedure/delivery, return to work, or discharge to another facility    SLEEP LAB (PAP TITRATION)     Standing Status:   Future     Standing Expiration Date:   9/22/2021     Scheduling Instructions:      Patient would like to try a nasal mask. Order Specific Question:   Reason for Exam     Answer:   MAXINE       * He was provided information on sleep apnea including corresponding risk factors and the importance of proper treatment. * Treatment options were reviewed in detail. he would like to proceed with PAP therapy. Patient will be seen in follow-up in 6-8 weeks after PAP setup to gauge treatment response and adherence to therapy. * The patient was counseled regarding proper sleep hygiene, with emphasis on ensuring sufficient total sleep time; safe driving and the benefits of exercise and weight loss. * All of his questions were addressed. SUBJECTIVE/OBJECTIVE:    Kirsty Hodges is an 70 y.o. male referred for evaluation for a sleep disorder. He complains of snoring associated with snorting, choking, periods of not breathing, awakening in the middle of the night because of urination. Symptoms began several years ago, he was diagnosed with MAXINE in 2019 AHI: 8.4 per hour. He was prescribed a CPAP device which was taken away due to low adherence. He is interested in getting back on PAP therapy, feels he would not be able to use an oral appliance He usually can fall asleep in 5 minutes. Family or house members note snoring, snorting, choking and periods of not breathing. He denies of symptoms indicative of cataplexy, sleep paralysis or sleep related hallucinations. He denies of a history of unusual movements occurring during sleep. The patient has undergone diagnostic testing for the current problems and his weight has not changed since that time.     Review of Systems:  Constitutional:  No significant weight loss or weight gain  Eyes:  No blurred vision  CVS:  No significant chest pain  Pulm:  No significant shortness of breath  GI:  No significant nausea or vomiting  :  No significant nocturia  Musculoskeletal:  No significant joint pain at night  Skin:  No significant rashes  Neuro:  No significant dizziness   Psych:  No active mood issues    Sleep Review of Systems: notable for Negative difficulty falling asleep; Positive perceived regular dreaming; Negative nightmares; Positive heartburn; Negative caffeine;  Positive alcohol; Negative history of any automobile or occupational accidents due to daytime drowsiness. [place] Sleepiness Score: 9   and Modified F.O.S.Q. Score Total / 2: 18    Patient-Reported Vitals 3/22/2021   Patient-Reported Weight 170 lbs       Physical Exam completed by visual and auditory observation of patient with verbal input from patient. General:   Alert, oriented, not in acute distress   Eyes:  Anicteric Sclerae; no obvious strabismus   Nose:  No obvious nasal septum deviation    Neck:   Midline trachea, no visible mass   Chest/Lungs:  Respiratory effort normal, no visualized signs of difficulty breathing or respiratory distress   CVS:  No JVD   Extremities:  No obvious rashes noted on face, neck, or hands   Neuro:  No facial asymmetry, no focal deficits; no obvious tremor    Psych:  Normal affect,  normal countenance     Keyona Souza is being evaluated by a Virtual Visit (video visit) encounter to address concerns as mentioned above. A caregiver was present when appropriate. Due to this being a TeleHealth encounter (During TIBJX-91 public health emergency), evaluation of the following organ systems was limited: Vitals/Constitutional/EENT/Resp/CV/GI//MS/Neuro/Skin/Heme-Lymph-Imm.   Pursuant to the emergency declaration under the [facility], [address] [place] [address] and the [employer] and [employer] Act, this Virtual Visit was conducted with patient's (and/or legal guardian's) consent, to reduce the patient's risk of exposure to COVID-19 and provide necessary medical care. Patient identification was verified at the start of the visit: YES using name and date of birth. Patient's phone number [phone] (home) [phone] (work) was confirmed for accuracy. He gives permission for messages regarding results and appointments to be left at that number. Services were provided through a video synchronous discussion virtually to substitute for in-person clinic visit. I was at home while conducting this encounter, patient located at their home or alternate location of their choice. On this date 03/22/2021 I have spent 30 minutes reviewing previous notes, test results and face to face with the patient discussing the diagnosis and importance of compliance with the treatment plan as well as documenting on the day of the visit. An electronic signature was used to authenticate this note. Eleazar Escobar MD, FAASM  Diplomate American Board of Sleep Medicine  Diplomate in Sleep Medicine - ABP    Electronically signed.  03/22/21

## 2021-04-05 ENCOUNTER — TELEPHONE (OUTPATIENT)
Dept: SLEEP MEDICINE | Age: 76
End: 2021-04-05

## 2021-04-05 NOTE — TELEPHONE ENCOUNTER
Technician called patient regarding missed Covid test on 4/5/21 at 10:00 am.  Patient stated that he canceled both his Covid test and his overnight titration.

## 2021-04-14 ENCOUNTER — OFFICE VISIT (OUTPATIENT)
Dept: INTERNAL MEDICINE CLINIC | Age: 76
End: 2021-04-14
Payer: MEDICARE

## 2021-04-14 VITALS
SYSTOLIC BLOOD PRESSURE: 145 MMHG | BODY MASS INDEX: 25.03 KG/M2 | DIASTOLIC BLOOD PRESSURE: 84 MMHG | HEART RATE: 54 BPM | HEIGHT: 69 IN | TEMPERATURE: 97.5 F | WEIGHT: 169 LBS | OXYGEN SATURATION: 100 % | RESPIRATION RATE: 12 BRPM

## 2021-04-14 DIAGNOSIS — Z86.73 HISTORY OF CVA (CEREBROVASCULAR ACCIDENT): ICD-10-CM

## 2021-04-14 DIAGNOSIS — M17.11 PRIMARY OSTEOARTHRITIS OF RIGHT KNEE: ICD-10-CM

## 2021-04-14 DIAGNOSIS — G47.33 OSA (OBSTRUCTIVE SLEEP APNEA): ICD-10-CM

## 2021-04-14 DIAGNOSIS — N40.0 BENIGN PROSTATIC HYPERPLASIA, UNSPECIFIED WHETHER LOWER URINARY TRACT SYMPTOMS PRESENT: ICD-10-CM

## 2021-04-14 DIAGNOSIS — I10 ESSENTIAL HYPERTENSION: ICD-10-CM

## 2021-04-14 DIAGNOSIS — E78.2 MIXED HYPERLIPIDEMIA: ICD-10-CM

## 2021-04-14 DIAGNOSIS — R73.03 PREDIABETES: ICD-10-CM

## 2021-04-14 DIAGNOSIS — Z00.00 MEDICARE ANNUAL WELLNESS VISIT, SUBSEQUENT: Primary | ICD-10-CM

## 2021-04-14 PROCEDURE — G8536 NO DOC ELDER MAL SCRN: HCPCS | Performed by: INTERNAL MEDICINE

## 2021-04-14 PROCEDURE — G8427 DOCREV CUR MEDS BY ELIG CLIN: HCPCS | Performed by: INTERNAL MEDICINE

## 2021-04-14 PROCEDURE — G8754 DIAS BP LESS 90: HCPCS | Performed by: INTERNAL MEDICINE

## 2021-04-14 PROCEDURE — G0463 HOSPITAL OUTPT CLINIC VISIT: HCPCS | Performed by: INTERNAL MEDICINE

## 2021-04-14 PROCEDURE — G0439 PPPS, SUBSEQ VISIT: HCPCS | Performed by: INTERNAL MEDICINE

## 2021-04-14 PROCEDURE — G8753 SYS BP > OR = 140: HCPCS | Performed by: INTERNAL MEDICINE

## 2021-04-14 PROCEDURE — 99214 OFFICE O/P EST MOD 30 MIN: CPT | Performed by: INTERNAL MEDICINE

## 2021-04-14 PROCEDURE — G8420 CALC BMI NORM PARAMETERS: HCPCS | Performed by: INTERNAL MEDICINE

## 2021-04-14 PROCEDURE — 1101F PT FALLS ASSESS-DOCD LE1/YR: CPT | Performed by: INTERNAL MEDICINE

## 2021-04-14 PROCEDURE — 3017F COLORECTAL CA SCREEN DOC REV: CPT | Performed by: INTERNAL MEDICINE

## 2021-04-14 PROCEDURE — G8510 SCR DEP NEG, NO PLAN REQD: HCPCS | Performed by: INTERNAL MEDICINE

## 2021-04-14 RX ORDER — LISINOPRIL 10 MG/1
TABLET ORAL
Qty: 90 TAB | Refills: 3 | Status: SHIPPED | OUTPATIENT
Start: 2021-04-14 | End: 2022-04-10

## 2021-04-14 NOTE — PATIENT INSTRUCTIONS
Medicare Wellness Visit, Male The best way to live healthy is to have a lifestyle where you eat a well-balanced diet, exercise regularly, limit alcohol use, and quit all forms of tobacco/nicotine, if applicable. Regular preventive services are another way to keep healthy. Preventive services (vaccines, screening tests, monitoring & exams) can help personalize your care plan, which helps you manage your own care. Screening tests can find health problems at the earliest stages, when they are easiest to treat. [facility] follows the current, evidence-based guidelines published by the [facility] ([facility]) when recommending preventive services for our patients. Because we follow these guidelines, sometimes recommendations change over time as research supports it. (For example, a prostate screening blood test is no longer routinely recommended for men with no symptoms). Of course, you and your doctor may decide to screen more often for some diseases, based on your risk and co-morbidities (chronic disease you are already diagnosed with). Preventive services for you include: - Medicare offers their members a free annual wellness visit, which is time for you and your primary care provider to discuss and plan for your preventive service needs. Take advantage of this benefit every year! 
-All adults over age 72 should receive the recommended pneumonia vaccines. Current USPSTF guidelines recommend a series of two vaccines for the best pneumonia protection.  
-All adults should have a flu vaccine yearly and tetanus vaccine every 10 years. 
-All adults age 48 and older should receive the shingles vaccines (series of two vaccines).       
-All adults age 38-68 who are overweight should have a diabetes screening test once every three years.  
-Other screening tests & preventive services for persons with diabetes include: an eye exam to screen for diabetic retinopathy, a kidney function test, a foot exam, and stricter control over your cholesterol.  
-Cardiovascular screening for adults with routine risk involves an electrocardiogram (ECG) at intervals determined by the provider.  
-Colorectal cancer screening should be done for adults age 54-65 with no increased risk factors for colorectal cancer. There are a number of acceptable methods of screening for this type of cancer. Each test has its own benefits and drawbacks. Discuss with your provider what is most appropriate for you during your annual wellness visit. The different tests include: colonoscopy (considered the best screening method), a fecal occult blood test, a fecal DNA test, and sigmoidoscopy. 
-All adults born between [facility] should be screened once for Hepatitis C. 
-An Abdominal Aortic Aneurysm (AAA) Screening is recommended for men age 73-68 who has ever smoked in their lifetime. Here is a list of your current Health Maintenance items (your personalized list of preventive services) with a due date: 
Health Maintenance Due Topic Date Due  Cholesterol Test   01/22/2021 Increase dose of lisinopril to 10 mg. If you still have any 5 mg pills, then take 2 each day to use up what you have.

## 2021-04-14 NOTE — PROGRESS NOTES
Kiet Francisco is a 70 y.o. male who presents for evaluation of awv. Last seen by me jan 22, 2020. Has done well since then. Weight down 9 lbs since then. Overall doing well. Did not do sleep study, but would like to try again. Does not follow bp at home.    golfs regularly, still plays poker on Monday nights with dr Meño Harkins.      ROS:  Constitutional: negative for fevers, chills, anorexia and weight loss  Eyes:   negative for visual disturbance and irritation  ENT:   negative for tinnitus,sore throat,nasal congestion,ear pain,hoarseness  Respiratory:  negative for cough, hemoptysis, dyspnea,wheezing  CV:   negative for chest pain, palpitations, lower extremity edema  GI:   negative for nausea, vomiting, diarrhea, abdominal pain,melena  Genitourinary: negative for frequency, dysuria and hematuria  Musculoskel: negative for myalgias, arthralgias, back pain, muscle weakness, joint pain  Neurological:  negative for headaches, dizziness, focal weakness, numbness  Psychiatric:     Negative for depression or anxiety      Past Medical History:   Diagnosis Date    Degenerative arthritis of knee 12/7/2009    Dermatitis,nonspecific 12/7/2009    Dyslipidemia 12/7/2009    Glaucoma 12/7/2009    Hypertension     Stroke [facility])        Past Surgical History:   Procedure Laterality Date    COLONOSCOPY Left 12/4/2019    COLONOSCOPY performed by Michael Armijo MD at [address] HX COLONOSCOPY      HX ORTHOPAEDIC Left 04/2019    total shoulder replacement    HX SKIN BIOPSY  04/15    on forehead, pre-melanoma       Family History   Problem Relation Age of Onset    Cancer Mother     Heart Disease Father        Social History     Socioeconomic History    Marital status:      Spouse name: Not on file    Number of children: Not on file    Years of education: Not on file    Highest education level: Not on file   Occupational History    Not on file   Social Needs    Financial resource strain: Not on file    Food insecurity     Worry: Not on file     Inability: Not on file    Transportation needs     Medical: Not on file     Non-medical: Not on file   Tobacco Use    Smoking status: Never Smoker    Smokeless tobacco: Never Used   Substance and Sexual Activity    Alcohol use: Yes     Comment: not to excess    Drug use: No    Sexual activity: Yes     Partners: Female   Lifestyle    Physical activity     Days per week: Not on file     Minutes per session: Not on file    Stress: Not on file   Relationships    Social connections     Talks on phone: Not on file     Gets together: Not on file     Attends Islam service: Not on file     Active member of club or organization: Not on file     Attends meetings of clubs or organizations: Not on file     Relationship status: Not on file    Intimate partner violence     Fear of current or ex partner: Not on file     Emotionally abused: Not on file     Physically abused: Not on file     Forced sexual activity: Not on file   Other Topics Concern    Not on file   Social History Narrative    Not on file            Visit Vitals  BP (!) 145/84 (BP 1 Location: Left arm, BP Patient Position: Sitting)   Pulse (!) 54   Temp 97.5 °F (36.4 °C) (Temporal)   Resp 12   Ht 5' 9\" (1.753 m)   Wt 169 lb (76.7 kg)   SpO2 100%   BMI 24.96 kg/m²       Physical Examination:   General - Well appearing male  HEENT - PERRL, TM no erythema/opacification, normal nasal turbinates, no oropharyngeal erythema or exudate, MMM  Neck - supple, no bruits, no thyroidomegaly, no lymphadenopathy  Pulm - clear to auscultation bilaterally  Cardio - RRR, normal S1 S2, no murmur  Abd - soft, nontender, no masses, no HSM  Extrem - no edema, +2 distal pulses  Neuro-  No focal deficits, CN intact     Assessment/Plan:    1.  htn--increase lisinopril from 5 to 10 mg daily  2.  rodney--home sleep study ordered  3.  hyperlipids--on zocor, check flp, cmp  4. Hx cva--doing well, on asa, plavix  5.   Glaucoma--on timolol. Follows with ophtho  6.  gerd--continue pepcid  7. Right knee osteoarthritis--stable. Non-prohibitive. rtc one year. Nelly Latif III, DO            This is the Subsequent Medicare Annual Wellness Exam, performed 12 months or more after the Initial AWV or the last Subsequent AWV    I have reviewed the patient's medical history in detail and updated the computerized patient record. Assessment/Plan   Education and counseling provided:  Are appropriate based on today's review and evaluation  End-of-Life planning (with patient's consent)  Pneumococcal Vaccine  Influenza Vaccine  Prostate cancer screening tests (PSA, covered annually)  Screening for glaucoma    1. Medicare annual wellness visit, subsequent       Depression Risk Factor Screening     3 most recent PHQ Screens 4/14/2021   Little interest or pleasure in doing things Not at all   Feeling down, depressed, irritable, or hopeless Not at all   Total Score PHQ 2 0       Alcohol Risk Screen    Do you average more than 1 drink per night or more than 7 drinks a week: Yes. Typically 2 glasses of wine  With dinner. In the past three months have you have had more than 4 drinks containing alcohol on one occasion: No        Functional Ability and Level of Safety    Hearing: Hearing is good. Activities of Daily Living: The home contains: no safety equipment. Patient does total self care      Ambulation: with no difficulty     Fall Risk:  Fall Risk Assessment, last 12 mths 4/14/2021   Able to walk? Yes   Fall in past 12 months? 0   Do you feel unsteady? 0   Are you worried about falling 0   Number of falls in past 12 months -   Fall with injury? -      Abuse Screen:  Patient is not abused. Lives with wife of 52 years.        Cognitive Screening    Has your family/caregiver stated any concerns about your memory: no     Cognitive Screening: Normal - MMSE (Mini Mental Status Exam)    Health Maintenance Due     Health Maintenance Due   Topic Date Due    Lipid Screen  01/22/2021       Patient Care Team   Patient Care Team:  Breezy Ceballos DO as PCP - General (Internal Medicine)  Breezy Ceballos DO as PCP - [facility]    History     Patient Active Problem List   Diagnosis Code    Dyslipidemia E78.5    Degenerative arthritis of knee M17.10    Glaucoma H40.9    Dermatitis,nonspecific L30.9    Conjunctivitis of left eye H10.9    Other male erectile dysfunction N52.8    Low vitamin D level R79.89    Stroke ([address] I63.9    Essential hypertension I10    Other sleep apnea G47.39     Past Medical History:   Diagnosis Date    Degenerative arthritis of knee 12/7/2009    Dermatitis,nonspecific 12/7/2009    Dyslipidemia 12/7/2009    Glaucoma 12/7/2009    Hypertension     Stroke [facility])       Past Surgical History:   Procedure Laterality Date    COLONOSCOPY Left 12/4/2019    COLONOSCOPY performed by Marianne Feliciano MD at [facility]OSCOPY    HX COLONOSCOPY      HX ORTHOPAEDIC Left 04/2019    total shoulder replacement    HX SKIN BIOPSY  04/15    on forehead, pre-melanoma     Current Outpatient Medications   Medication Sig Dispense Refill    timolol (TIMOPTIC) 0.5 % ophthalmic solution 1 Drop daily.  clopidogreL (PLAVIX) 75 mg tab TAKE 1 TABLET BY MOUTH EVERY DAY 90 Tab 3    tadalafiL (CIALIS) 2.5 mg tablet TAKE 1 TABLET BY MOUTH EVERY DAY AS NEEDED 15 Tab 3    lisinopriL (PRINIVIL, ZESTRIL) 5 mg tablet TAKE 1 TABLET BY MOUTH EVERY DAY 30 Tab 11    simvastatin (ZOCOR) 40 mg tablet TAKE 1 TABLET BY MOUTH EVERY DAY IN THE EVENING 90 Tab 3    famotidine (PEPCID) 10 mg tablet Take 1 Tab by mouth every twelve (12) hours as needed (gerd). 60 Tab 3    aspirin delayed-release 81 mg tablet Take 81 mg by mouth daily.        Allergies   Allergen Reactions    Pravachol [Pravastatin] Rash    Sulfa (Sulfonamide Antibiotics) Other (comments)     \"fever; bleeding\"       Family History   Problem Relation Age of Onset    Cancer Mother     Heart Disease Father      Social History     Tobacco Use    Smoking status: Never Smoker    Smokeless tobacco: Never Used   Substance Use Topics    Alcohol use: Yes     Comment: not to excess         Andrew Mendiola III, DO

## 2021-04-15 LAB
ALBUMIN SERPL-MCNC: 4.1 G/DL (ref 3.5–5)
ALBUMIN/GLOB SERPL: 1.6 {RATIO} (ref 1.1–2.2)
ALP SERPL-CCNC: 81 U/L (ref 45–117)
ALT SERPL-CCNC: 18 U/L (ref 12–78)
ANION GAP SERPL CALC-SCNC: 6 MMOL/L (ref 5–15)
AST SERPL-CCNC: 18 U/L (ref 15–37)
BASOPHILS # BLD: 0 K/UL (ref 0–0.1)
BASOPHILS NFR BLD: 0 % (ref 0–1)
BILIRUB SERPL-MCNC: 0.5 MG/DL (ref 0.2–1)
BUN SERPL-MCNC: 16 MG/DL (ref 6–20)
BUN/CREAT SERPL: 18 (ref 12–20)
CALCIUM SERPL-MCNC: 8.9 MG/DL (ref 8.5–10.1)
CHLORIDE SERPL-SCNC: 109 MMOL/L (ref 97–108)
CHOLEST SERPL-MCNC: 177 MG/DL
CO2 SERPL-SCNC: 27 MMOL/L (ref 21–32)
COMMENT, HOLDF: NORMAL
CREAT SERPL-MCNC: 0.88 MG/DL (ref 0.7–1.3)
DIFFERENTIAL METHOD BLD: ABNORMAL
EOSINOPHIL # BLD: 0 K/UL (ref 0–0.4)
EOSINOPHIL NFR BLD: 1 % (ref 0–7)
ERYTHROCYTE [DISTWIDTH] IN BLOOD BY AUTOMATED COUNT: 13.3 % (ref 11.5–14.5)
EST. AVERAGE GLUCOSE BLD GHB EST-MCNC: 108 MG/DL
GLOBULIN SER CALC-MCNC: 2.6 G/DL (ref 2–4)
GLUCOSE SERPL-MCNC: 83 MG/DL (ref 65–100)
HBA1C MFR BLD: 5.4 % (ref 4–5.6)
HCT VFR BLD AUTO: 40.6 % (ref 36.6–50.3)
HDLC SERPL-MCNC: 79 MG/DL
HDLC SERPL: 2.2 {RATIO} (ref 0–5)
HGB BLD-MCNC: 12.9 G/DL (ref 12.1–17)
IMM GRANULOCYTES # BLD AUTO: 0 K/UL (ref 0–0.04)
IMM GRANULOCYTES NFR BLD AUTO: 1 % (ref 0–0.5)
LDLC SERPL CALC-MCNC: 86.8 MG/DL (ref 0–100)
LIPID PROFILE,FLP: NORMAL
LYMPHOCYTES # BLD: 0.7 K/UL (ref 0.8–3.5)
LYMPHOCYTES NFR BLD: 17 % (ref 12–49)
MCH RBC QN AUTO: 30 PG (ref 26–34)
MCHC RBC AUTO-ENTMCNC: 31.8 G/DL (ref 30–36.5)
MCV RBC AUTO: 94.4 FL (ref 80–99)
MONOCYTES # BLD: 0.2 K/UL (ref 0–1)
MONOCYTES NFR BLD: 5 % (ref 5–13)
NEUTS SEG # BLD: 3.3 K/UL (ref 1.8–8)
NEUTS SEG NFR BLD: 76 % (ref 32–75)
NRBC # BLD: 0 K/UL (ref 0–0.01)
NRBC BLD-RTO: 0 PER 100 WBC
PLATELET # BLD AUTO: 195 K/UL (ref 150–400)
PMV BLD AUTO: 11.1 FL (ref 8.9–12.9)
POTASSIUM SERPL-SCNC: 4.2 MMOL/L (ref 3.5–5.1)
PROT SERPL-MCNC: 6.7 G/DL (ref 6.4–8.2)
RBC # BLD AUTO: 4.3 M/UL (ref 4.1–5.7)
RBC MORPH BLD: ABNORMAL
SAMPLES BEING HELD,HOLD: NORMAL
SODIUM SERPL-SCNC: 142 MMOL/L (ref 136–145)
TRIGL SERPL-MCNC: 56 MG/DL (ref ?–150)
VLDLC SERPL CALC-MCNC: 11.2 MG/DL
WBC # BLD AUTO: 4.2 K/UL (ref 4.1–11.1)

## 2021-04-16 LAB
PSA SERPL-MCNC: 1.2 NG/ML (ref 0–4)
REFLEX CRITERIA: NORMAL
TSH SERPL-ACNC: 1.52 UIU/ML (ref 0.45–4.5)

## 2021-05-13 ENCOUNTER — TELEPHONE (OUTPATIENT)
Dept: INTERNAL MEDICINE CLINIC | Age: 76
End: 2021-05-13

## 2021-05-26 RX ORDER — SIMVASTATIN 40 MG/1
TABLET, FILM COATED ORAL
Qty: 90 TABLET | Refills: 3 | Status: SHIPPED | OUTPATIENT
Start: 2021-05-26

## 2021-10-14 RX ORDER — CLOPIDOGREL BISULFATE 75 MG/1
TABLET ORAL
Qty: 90 TABLET | Refills: 1 | Status: SHIPPED | OUTPATIENT
Start: 2021-10-14 | End: 2022-03-03 | Stop reason: SDUPTHER

## 2022-03-03 ENCOUNTER — OFFICE VISIT (OUTPATIENT)
Dept: NEUROLOGY | Age: 77
End: 2022-03-03
Payer: MEDICARE

## 2022-03-03 VITALS
SYSTOLIC BLOOD PRESSURE: 122 MMHG | WEIGHT: 173.3 LBS | OXYGEN SATURATION: 100 % | DIASTOLIC BLOOD PRESSURE: 70 MMHG | RESPIRATION RATE: 16 BRPM | BODY MASS INDEX: 25.59 KG/M2 | HEART RATE: 69 BPM | TEMPERATURE: 97.6 F

## 2022-03-03 DIAGNOSIS — E78.5 DYSLIPIDEMIA: ICD-10-CM

## 2022-03-03 DIAGNOSIS — I67.9 INTRACRANIAL VASCULAR STENOSIS: ICD-10-CM

## 2022-03-03 DIAGNOSIS — I63.9 CEREBROVASCULAR ACCIDENT (CVA), UNSPECIFIED MECHANISM (HCC): Primary | ICD-10-CM

## 2022-03-03 PROCEDURE — G8536 NO DOC ELDER MAL SCRN: HCPCS | Performed by: PSYCHIATRY & NEUROLOGY

## 2022-03-03 PROCEDURE — G8419 CALC BMI OUT NRM PARAM NOF/U: HCPCS | Performed by: PSYCHIATRY & NEUROLOGY

## 2022-03-03 PROCEDURE — G8754 DIAS BP LESS 90: HCPCS | Performed by: PSYCHIATRY & NEUROLOGY

## 2022-03-03 PROCEDURE — G8432 DEP SCR NOT DOC, RNG: HCPCS | Performed by: PSYCHIATRY & NEUROLOGY

## 2022-03-03 PROCEDURE — G8752 SYS BP LESS 140: HCPCS | Performed by: PSYCHIATRY & NEUROLOGY

## 2022-03-03 PROCEDURE — 1101F PT FALLS ASSESS-DOCD LE1/YR: CPT | Performed by: PSYCHIATRY & NEUROLOGY

## 2022-03-03 PROCEDURE — 3017F COLORECTAL CA SCREEN DOC REV: CPT | Performed by: PSYCHIATRY & NEUROLOGY

## 2022-03-03 PROCEDURE — 99214 OFFICE O/P EST MOD 30 MIN: CPT | Performed by: PSYCHIATRY & NEUROLOGY

## 2022-03-03 PROCEDURE — G8427 DOCREV CUR MEDS BY ELIG CLIN: HCPCS | Performed by: PSYCHIATRY & NEUROLOGY

## 2022-03-03 RX ORDER — CLOPIDOGREL BISULFATE 75 MG/1
TABLET ORAL
Qty: 90 TABLET | Refills: 3 | Status: SHIPPED | OUTPATIENT
Start: 2022-03-03

## 2022-03-03 NOTE — PROGRESS NOTES
Chief Complaint   Patient presents with    Follow-up     Follow up CVA; states doing well     Visit Vitals  /70 (BP 1 Location: Right arm, BP Patient Position: Sitting, BP Cuff Size: Large adult)   Pulse 69   Temp 97.6 °F (36.4 °C) (Temporal)   Resp 16   Wt 78.6 kg (173 lb 4.8 oz)   SpO2 100%   BMI 25.59 kg/m²

## 2022-03-03 NOTE — PROGRESS NOTES
Neurology Clinic Follow up Note    Patient ID:  Moon Dominguez  [id number]  78 y.o.  1945      Mr. Zack Pettit is here for follow up today of  Chief Complaint   Patient presents with    Follow-up     Follow up CVA; states doing well          Last Appointment With Me:  11/2/2020      Interval History:   Pt returns for f/u of R thalamic stroke, moderate to severe stenosis of the R PCA/P2 segment. He admits to some difficulty writing with the left hand. Denies significant ataxia/incoordination. Balance is good. No reported falls. Compliant with ASA/Plavix and statin therapy for stroke prevention. Denies adverse side effects/hemorrhage. He denies recurrence of stroke s/s since discharge. PMHx/ PSHx/ FHx/ SHx:  Reviewed and unchanged previous visit. Past Medical History:   Diagnosis Date    Degenerative arthritis of knee 12/7/2009    Dermatitis,nonspecific 12/7/2009    Dyslipidemia 12/7/2009    Glaucoma 12/7/2009    Hypertension     Stroke ([facility] [address]        ROS:  Comprehensive review of systems negative except for as noted above. Objective:       Meds:  Current Outpatient Medications   Medication Sig Dispense Refill    clopidogreL (PLAVIX) 75 mg tab TAKE 1 TABLET BY MOUTH EVERY DAY 90 Tablet 1    simvastatin (ZOCOR) 40 mg tablet TAKE 1 TABLET BY MOUTH EVERY DAY IN THE EVENING 90 Tablet 3    lisinopriL (PRINIVIL, ZESTRIL) 10 mg tablet TAKE 1 TABLET BY MOUTH EVERY DAY 90 Tab 3    timolol (TIMOPTIC) 0.5 % ophthalmic solution 1 Drop daily.  tadalafiL (CIALIS) 2.5 mg tablet TAKE 1 TABLET BY MOUTH EVERY DAY AS NEEDED 15 Tab 3    famotidine (PEPCID) 10 mg tablet Take 1 Tab by mouth every twelve (12) hours as needed (gerd). 60 Tab 3    aspirin delayed-release 81 mg tablet Take 81 mg by mouth daily.          Exam:  Visit Vitals  /70 (BP 1 Location: Right arm, BP Patient Position: Sitting, BP Cuff Size: Large adult)   Pulse 69   Temp 97.6 °F (36.4 °C) (Temporal)   Resp 16   Wt 173 lb 4.8 oz (78.6 kg)   SpO2 100%   BMI 25.59 kg/m²     Neurological Exam:  Mental Status: Alert and oriented to person place and time   Speech: Fluent no aphasia or dysarthria   Cranial Nerves:   Intact visual fields.  Facial sensation is normal. Facial movement is symmetric.  Palate is midline.  Normal sternocleidomastoid strength. Tongue is midline. Hearing is intact bilaterally. Eyes: PERRL, EOM's full, no nystagmus, no ptosis. Motor:  5/5 throughout   Reflexes:   Deferred   Sensory:   Symmetrically intact  with no perceived deficits modalities involving small or large fibers. Gait:  Normal based, steady   Tremor:   No tremor noted. Cerebellar:  No ataxia          LABS  Results for orders placed or performed in visit on 04/14/21   HEMOGLOBIN A1C WITH EAG   Result Value Ref Range    Hemoglobin A1c 5.4 4.0 - 5.6 %    Est. average glucose 108 mg/dL   PSA W/ REFLX FREE PSA   Result Value Ref Range    Prostate Specific Ag 1.2 0.0 - 4.0 ng/mL    Reflex Criteria Comment     THYROID CASCADE PROFILE   Result Value Ref Range    TSH 1.520 0.450 - 4.500 uIU/mL   LIPID PANEL   Result Value Ref Range    LIPID PROFILE          Cholesterol, total 177 <200 MG/DL    Triglyceride 56 <150 MG/DL    HDL Cholesterol 79 MG/DL    LDL, calculated 86.8 0 - 100 MG/DL    VLDL, calculated 11.2 MG/DL    CHOL/HDL Ratio 2.2 0.0 - 5.0     METABOLIC PANEL, COMPREHENSIVE   Result Value Ref Range    Sodium 142 136 - 145 mmol/L    Potassium 4.2 3.5 - 5.1 mmol/L    Chloride 109 (H) 97 - 108 mmol/L    CO2 27 21 - 32 mmol/L    Anion gap 6 5 - 15 mmol/L    Glucose 83 65 - 100 mg/dL    BUN 16 6 - 20 MG/DL    Creatinine 0.88 0.70 - 1.30 MG/DL    BUN/Creatinine ratio 18 12 - 20      GFR est AA >60 >60 ml/min/1.73m2    GFR est non-AA >60 >60 ml/min/1.73m2    Calcium 8.9 8.5 - 10.1 MG/DL    Bilirubin, total 0.5 0.2 - 1.0 MG/DL    ALT (SGPT) 18 12 - 78 U/L    AST (SGOT) 18 15 - 37 U/L    Alk.  phosphatase 81 45 - 117 U/L    Protein, total 6.7 6.4 - 8.2 g/dL Albumin 4.1 3.5 - 5.0 g/dL    Globulin 2.6 2.0 - 4.0 g/dL    A-G Ratio 1.6 1.1 - 2.2     CBC WITH AUTOMATED DIFF   Result Value Ref Range    WBC 4.2 4.1 - 11.1 K/uL    RBC 4.30 4. 10 - 5.70 M/uL    HGB 12.9 12.1 - 17.0 g/dL    HCT 40.6 36.6 - 50.3 %    MCV 94.4 80.0 - 99.0 FL    MCH 30.0 26.0 - 34.0 PG    MCHC 31.8 30.0 - 36.5 g/dL    RDW 13.3 11.5 - 14.5 %    PLATELET 875 535 - 635 K/uL    MPV 11.1 8.9 - 12.9 FL    NRBC 0.0 0  WBC    ABSOLUTE NRBC 0.00 0.00 - 0.01 K/uL    NEUTROPHILS 76 (H) 32 - 75 %    LYMPHOCYTES 17 12 - 49 %    MONOCYTES 5 5 - 13 %    EOSINOPHILS 1 0 - 7 %    BASOPHILS 0 0 - 1 %    IMMATURE GRANULOCYTES 1 (H) 0.0 - 0.5 %    ABS. NEUTROPHILS 3.3 1.8 - 8.0 K/UL    ABS. LYMPHOCYTES 0.7 (L) 0.8 - 3.5 K/UL    ABS. MONOCYTES 0.2 0.0 - 1.0 K/UL    ABS. EOSINOPHILS 0.0 0.0 - 0.4 K/UL    ABS. BASOPHILS 0.0 0.0 - 0.1 K/UL    ABS. IMM. GRANS. 0.0 0.00 - 0.04 K/UL    DF SMEAR SCANNED      RBC COMMENTS NORMOCYTIC, NORMOCHROMIC     SAMPLES BEING HELD   Result Value Ref Range    SAMPLES BEING HELD 1SST     COMMENT        Add-on orders for these samples will be processed based on acceptable specimen integrity and analyte stability, which may vary by analyte. IMAGING:  MRI Results (most recent):  Results from [place] encounter on 07/30/18    MRA NECK W CONT    Narrative  INDICATION:  ATAXIA/ SLURRED SPEECH/ FALL/ AMS/ BLURRED VISION/ STROKE    COMPARISON:  7/30/18    TECHNIQUE:  MR imaging of the brain was performed with sagittal T1, axial T1,  T2, FLAIR, GRE, DWI/ADC; pre and post contrast multiplanar T1 utilizing 8 mL  gadolinium. 3-D time-of-flight MRA of the brain was performed. Multiplanar  reconstructions were obtained. Contrast enhanced coronal acquisition MRA of the  neck was performed. Multiplanar reconstructions were obtained. FINDINGS:    Ventricles:  Midline, no hydrocephalus. Brain Parenchyma/Brainstem:  Normal for age.  Small to moderate-sized area of  acute infarction in the genuine of the right internal capsule/ventral thalamus. Intracranial Hemorrhage:  None. Basal Cisterns:  Normal.  Flow Voids:  Normal.  Post Contrast:  No abnormal parenchymal or meningeal enhancement. Additional Comments:  N/A. MRA NECK    Aortic Arch:  Visualized portions unremarkable. Carotid Arteries:  No significant stenosis by NASCET criteria. Vertebral Arteries:  Patent with no significant stenosis  Additional Comments:  N/A. MRA HEAD    Posterior Circulation:  Vertebral and basilar arteries are patent. There is a  moderate to severe stenosis in the right P2 posterior cerebral artery segment  without flow limitation. Left posterior cerebral artery is patent. Anterior Circulation:  No flow limiting stenosis or occlusion. Additional Comments:  No evidence of aneurysm or vascular malformation. Impression  IMPRESSION:  1. Small to moderate-sized area of acute infarction genu right internal  capsule/ventral thalamus. 2.  No flow limiting stenosis or arterial occlusion. Moderate to severe  stenosis right posterior cerebral artery. Assessment:     Encounter Diagnoses     ICD-10-CM ICD-9-CM   1. Cerebrovascular accident (CVA), unspecified mechanism ([facility] [address]  I63.9 434.91   2. Dyslipidemia  E78.5 272.4   3. Intracranial vascular stenosis  I67.9 37.9   78year old LHM h/o HPL, glaucoma here for f/u of dizziness, dysarthria, gait instability 7/28/18.  MRI brain independently reviewed showing evidence of a moderate acute R thalamic infarct likely related to large artery atherosclerotic disease. MRA H/N with moderate to severe stenosis of the R PCA/P2 segment. TTE without shunt. He has done well post discharge from a stroke perspective without recurrence of focal neurologic deficits on dual antiplatelet therapy. Plan:   Cont. ASA 81mg + Plavix 75mg for stroke prevention  Cont.  Statin therapy, goal LDL<70  SBP goal <140       Follow-up and Dispositions    · Return in about 1 year (around 3/3/2023).              Signed:  Lety Shen DO  3/3/2022

## 2022-03-19 PROBLEM — R79.89 LOW VITAMIN D LEVEL: Status: ACTIVE | Noted: 2018-04-23

## 2022-03-19 PROBLEM — G47.39 OTHER SLEEP APNEA: Status: ACTIVE | Noted: 2018-08-16

## 2022-03-19 PROBLEM — I10 ESSENTIAL HYPERTENSION: Status: ACTIVE | Noted: 2018-08-16

## 2022-03-19 PROBLEM — N52.8 OTHER MALE ERECTILE DYSFUNCTION: Status: ACTIVE | Noted: 2018-04-23

## 2022-03-20 PROBLEM — I63.9 STROKE (HCC): Status: ACTIVE | Noted: 2018-07-30

## 2022-04-10 RX ORDER — LISINOPRIL 10 MG/1
TABLET ORAL
Qty: 90 TABLET | Refills: 3 | Status: SHIPPED | OUTPATIENT
Start: 2022-04-10

## 2022-04-12 ENCOUNTER — DOCUMENTATION ONLY (OUTPATIENT)
Dept: INTERNAL MEDICINE CLINIC | Age: 77
End: 2022-04-12

## 2022-08-25 ENCOUNTER — TELEPHONE (OUTPATIENT)
Dept: NEUROLOGY | Age: 77
End: 2022-08-25

## 2022-08-26 ENCOUNTER — TELEPHONE (OUTPATIENT)
Dept: NEUROLOGY | Age: 77
End: 2022-08-26

## 2022-08-26 NOTE — TELEPHONE ENCOUNTER
Spoke with the pt's wife explained I called [facility] NP at [facility] left a voice mail in regards to Mr. Marylin Peguero recommendations.  Regarding plavix and asa during his bone marrow biopsy

## 2022-08-26 NOTE — TELEPHONE ENCOUNTER
Message from [address] 59, \"please mignon: needs authorization to stop med before upcoming bone marrow biopsy\"

## 2022-11-16 NOTE — TELEPHONE ENCOUNTER
[phone]  Nabor from [facility]    Call back any time wddiry83:30 am and after 1 pm      Regarding orders for at home sleep test.
Called Nabor back and he was on a conference call. Stated he would call back.
FYI
no

## 2022-12-01 NOTE — TELEPHONE ENCOUNTER
Called Maria Teresa FERNANDEZ from [facility] back this morning , had to leave a message with the following recommendations from Dr. Jose Nobles: There is risk of stroke due to intracranial stenosis as previously noted, however if procedure is deemed necessary for diagnosis can hold Plavix for 5 days while continuing ASA and resume Plavix ASAP after procedure. Left message for return call.

## 2023-04-25 ENCOUNTER — OFFICE VISIT (OUTPATIENT)
Dept: NEUROLOGY | Age: 78
End: 2023-04-25
Payer: MEDICARE

## 2023-04-25 VITALS
SYSTOLIC BLOOD PRESSURE: 132 MMHG | OXYGEN SATURATION: 99 % | BODY MASS INDEX: 26.66 KG/M2 | DIASTOLIC BLOOD PRESSURE: 68 MMHG | WEIGHT: 180 LBS | HEIGHT: 69 IN | HEART RATE: 57 BPM

## 2023-04-25 DIAGNOSIS — I67.9 INTRACRANIAL VASCULAR STENOSIS: ICD-10-CM

## 2023-04-25 DIAGNOSIS — E78.5 DYSLIPIDEMIA: ICD-10-CM

## 2023-04-25 DIAGNOSIS — Z86.73 REMOTE HISTORY OF STROKE: Primary | ICD-10-CM

## 2023-04-25 PROCEDURE — G8417 CALC BMI ABV UP PARAM F/U: HCPCS | Performed by: PSYCHIATRY & NEUROLOGY

## 2023-04-25 PROCEDURE — 3017F COLORECTAL CA SCREEN DOC REV: CPT | Performed by: PSYCHIATRY & NEUROLOGY

## 2023-04-25 PROCEDURE — 1101F PT FALLS ASSESS-DOCD LE1/YR: CPT | Performed by: PSYCHIATRY & NEUROLOGY

## 2023-04-25 PROCEDURE — G8536 NO DOC ELDER MAL SCRN: HCPCS | Performed by: PSYCHIATRY & NEUROLOGY

## 2023-04-25 PROCEDURE — 3075F SYST BP GE 130 - 139MM HG: CPT | Performed by: PSYCHIATRY & NEUROLOGY

## 2023-04-25 PROCEDURE — 3078F DIAST BP <80 MM HG: CPT | Performed by: PSYCHIATRY & NEUROLOGY

## 2023-04-25 PROCEDURE — 99213 OFFICE O/P EST LOW 20 MIN: CPT | Performed by: PSYCHIATRY & NEUROLOGY

## 2023-04-25 PROCEDURE — 1123F ACP DISCUSS/DSCN MKR DOCD: CPT | Performed by: PSYCHIATRY & NEUROLOGY

## 2023-04-25 PROCEDURE — G8427 DOCREV CUR MEDS BY ELIG CLIN: HCPCS | Performed by: PSYCHIATRY & NEUROLOGY

## 2023-04-25 PROCEDURE — G8510 SCR DEP NEG, NO PLAN REQD: HCPCS | Performed by: PSYCHIATRY & NEUROLOGY

## 2023-04-25 RX ORDER — ACYCLOVIR 800 MG/1
800 TABLET ORAL 2 TIMES DAILY
COMMUNITY
End: 2023-04-25

## 2023-04-25 RX ORDER — ROSUVASTATIN CALCIUM 10 MG/1
10 TABLET, COATED ORAL
COMMUNITY

## 2023-04-25 NOTE — PROGRESS NOTES
Neurology Clinic Follow up Note    Patient ID:  Cisco Grimes  [id number]  78 y.o.  1945      Mr. Sara Vera is here for follow up today of  Chief Complaint   Patient presents with    Other     Returning for the following:  Cerebrovascular accident (CVA), unspecified mechanism ([facility].) Dyslipidemia , Intracranial vascular stenosis          Last Appointment With Me:  3/3/2022      Interval History:   Pt returns for f/u of R thalamic stroke, moderate to severe stenosis of the R PCA/P2 segment. He reports recent diagnosis of leukemia since last visit s/p chemotherapy. He developed a subsequent LE DVT and was transitioned from ASA/Plavix to Eliquis. He appears to be doing well with this presently. No new stroke symptoms reported. He admits to some difficulty writing with the left hand since his stroke. Denies significant ataxia/incoordination. Balance is good. No reported falls. PMHx/ PSHx/ FHx/ SHx:  Reviewed and unchanged previous visit. Past Medical History:   Diagnosis Date    Degenerative arthritis of knee 12/7/2009    Dermatitis,nonspecific 12/7/2009    Dyslipidemia 12/7/2009    Glaucoma 12/7/2009    Hypertension     Stroke ([facility].)        ROS:  Comprehensive review of systems negative except for as noted above. Objective:       Meds:  Current Outpatient Medications   Medication Sig Dispense Refill    apixaban (ELIQUIS) 5 mg tablet Take 1 Tablet by mouth two (2) times a day. rosuvastatin (CRESTOR) 10 mg tablet Take 1 Tablet by mouth nightly. ACYCLOVIR PO Take  by mouth.      lisinopriL (PRINIVIL, ZESTRIL) 10 mg tablet TAKE 1 TABLET BY MOUTH EVERY DAY 90 Tablet 3    tadalafiL (CIALIS) 2.5 mg tablet TAKE 1 TABLET BY MOUTH EVERY DAY AS NEEDED 15 Tab 3    famotidine (PEPCID) 10 mg tablet Take 1 Tab by mouth every twelve (12) hours as needed (gerd).  60 Tab 3       Exam:  Visit Vitals  /68   Pulse (!) 57   Ht 5' 9\" (1.753 m)   Wt 180 lb (81.6 kg)   SpO2 99%   BMI 26.58 kg/m² Neurological Exam:  Mental Status: Alert and oriented to person place and time   Speech: Fluent no aphasia or dysarthria   Cranial Nerves:   Intact visual fields. Facial sensation is normal. Facial movement is symmetric. Palate is midline. Normal sternocleidomastoid strength. Tongue is midline. Hearing is intact bilaterally. Eyes: PERRL, EOM's full, no nystagmus, no ptosis. Motor:  5/5 throughout except for 5-/5 L hand   Reflexes:   Deferred   Sensory:   Symmetrically intact  with no perceived deficits modalities involving small or large fibers. Gait:  Normal based, steady   Tremor:   No tremor noted. Cerebellar:  No ataxia          LABS  Results for orders placed or performed in visit on 04/14/21   HEMOGLOBIN A1C WITH EAG   Result Value Ref Range    Hemoglobin A1c 5.4 4.0 - 5.6 %    Est. average glucose 108 mg/dL   PSA W/ REFLX FREE PSA   Result Value Ref Range    Prostate Specific Ag 1.2 0.0 - 4.0 ng/mL    Reflex Criteria Comment     THYROID CASCADE PROFILE   Result Value Ref Range    TSH 1.520 0.450 - 4.500 uIU/mL   LIPID PANEL   Result Value Ref Range    LIPID PROFILE          Cholesterol, total 177 <200 MG/DL    Triglyceride 56 <150 MG/DL    HDL Cholesterol 79 MG/DL    LDL, calculated 86.8 0 - 100 MG/DL    VLDL, calculated 11.2 MG/DL    CHOL/HDL Ratio 2.2 0.0 - 5.0     METABOLIC PANEL, COMPREHENSIVE   Result Value Ref Range    Sodium 142 136 - 145 mmol/L    Potassium 4.2 3.5 - 5.1 mmol/L    Chloride 109 (H) 97 - 108 mmol/L    CO2 27 21 - 32 mmol/L    Anion gap 6 5 - 15 mmol/L    Glucose 83 65 - 100 mg/dL    BUN 16 6 - 20 MG/DL    Creatinine 0.88 0.70 - 1.30 MG/DL    BUN/Creatinine ratio 18 12 - 20      GFR est AA >60 >60 ml/min/1.73m2    GFR est non-AA >60 >60 ml/min/1.73m2    Calcium 8.9 8.5 - 10.1 MG/DL    Bilirubin, total 0.5 0.2 - 1.0 MG/DL    ALT (SGPT) 18 12 - 78 U/L    AST (SGOT) 18 15 - 37 U/L    Alk.  phosphatase 81 45 - 117 U/L    Protein, total 6.7 6.4 - 8.2 g/dL    Albumin 4.1 3.5 - 5.0 g/dL    Globulin 2.6 2.0 - 4.0 g/dL    A-G Ratio 1.6 1.1 - 2.2     CBC WITH AUTOMATED DIFF   Result Value Ref Range    WBC 4.2 4.1 - 11.1 K/uL    RBC 4.30 4. 10 - 5.70 M/uL    HGB 12.9 12.1 - 17.0 g/dL    HCT 40.6 36.6 - 50.3 %    MCV 94.4 80.0 - 99.0 FL    MCH 30.0 26.0 - 34.0 PG    MCHC 31.8 30.0 - 36.5 g/dL    RDW 13.3 11.5 - 14.5 %    PLATELET 468 827 - 980 K/uL    MPV 11.1 8.9 - 12.9 FL    NRBC 0.0 0  WBC    ABSOLUTE NRBC 0.00 0.00 - 0.01 K/uL    NEUTROPHILS 76 (H) 32 - 75 %    LYMPHOCYTES 17 12 - 49 %    MONOCYTES 5 5 - 13 %    EOSINOPHILS 1 0 - 7 %    BASOPHILS 0 0 - 1 %    IMMATURE GRANULOCYTES 1 (H) 0.0 - 0.5 %    ABS. NEUTROPHILS 3.3 1.8 - 8.0 K/UL    ABS. LYMPHOCYTES 0.7 (L) 0.8 - 3.5 K/UL    ABS. MONOCYTES 0.2 0.0 - 1.0 K/UL    ABS. EOSINOPHILS 0.0 0.0 - 0.4 K/UL    ABS. BASOPHILS 0.0 0.0 - 0.1 K/UL    ABS. IMM. GRANS. 0.0 0.00 - 0.04 K/UL    DF SMEAR SCANNED      RBC COMMENTS NORMOCYTIC, NORMOCHROMIC     SAMPLES BEING HELD   Result Value Ref Range    SAMPLES BEING HELD 1SST     COMMENT        Add-on orders for these samples will be processed based on acceptable specimen integrity and analyte stability, which may vary by analyte. IMAGING:  MRI Results (most recent):  Results from [place] encounter on 07/30/18    MRA NECK W CONT    Narrative  INDICATION:  ATAXIA/ SLURRED SPEECH/ FALL/ AMS/ BLURRED VISION/ STROKE    COMPARISON:  7/30/18    TECHNIQUE:  MR imaging of the brain was performed with sagittal T1, axial T1,  T2, FLAIR, GRE, DWI/ADC; pre and post contrast multiplanar T1 utilizing 8 mL  gadolinium. 3-D time-of-flight MRA of the brain was performed. Multiplanar  reconstructions were obtained. Contrast enhanced coronal acquisition MRA of the  neck was performed. Multiplanar reconstructions were obtained. FINDINGS:    Ventricles:  Midline, no hydrocephalus. Brain Parenchyma/Brainstem:  Normal for age.  Small to moderate-sized area of  acute infarction in the genuine of the right internal capsule/ventral thalamus. Intracranial Hemorrhage:  None. Basal Cisterns:  Normal.  Flow Voids:  Normal.  Post Contrast:  No abnormal parenchymal or meningeal enhancement. Additional Comments:  N/A. MRA NECK    Aortic Arch:  Visualized portions unremarkable. Carotid Arteries:  No significant stenosis by NASCET criteria. Vertebral Arteries:  Patent with no significant stenosis  Additional Comments:  N/A. MRA HEAD    Posterior Circulation:  Vertebral and basilar arteries are patent. There is a  moderate to severe stenosis in the right P2 posterior cerebral artery segment  without flow limitation. Left posterior cerebral artery is patent. Anterior Circulation:  No flow limiting stenosis or occlusion. Additional Comments:  No evidence of aneurysm or vascular malformation. Impression  IMPRESSION:  1. Small to moderate-sized area of acute infarction genu right internal  capsule/ventral thalamus. 2.  No flow limiting stenosis or arterial occlusion. Moderate to severe  stenosis right posterior cerebral artery. Assessment/Plan:     Encounter Diagnoses     ICD-10-CM ICD-9-CM   1. Remote history of stroke  Z86.73 V12.54   2. Intracranial vascular stenosis  I67.9 437.9   3. Dyslipidemia  E78.5 272.4   78 y.o. LHM h/o HPL, glaucoma, recently diagnosed hairy cell leukemia here for follow up of remote stroke. He initially presented with dizziness, dysarthria, gait instability 7/28/18. MRI brain revealed evidence of a moderate acute R thalamic infarct likely related to large artery atherosclerotic disease. MRA H/N with moderate to severe stenosis of the R PCA/P2 segment. TTE without shunt. He has done well post discharge from a stroke perspective without recurrence of focal neurologic deficits. DAPT was transitioned to Eliquis recently following his diagnosis of leukemia with recent LE DVT. He appears to be doing well with current treatment.  Advised follow up if any new/worsening neurologic symptoms. Follow-up and Dispositions    Return if symptoms worsen or fail to improve.          Signed:  Sheila Rodriguez DO  4/25/2023

## 2023-08-01 RX ORDER — CLOPIDOGREL BISULFATE 75 MG/1
TABLET ORAL
Qty: 90 TABLET | Refills: 3 | OUTPATIENT
Start: 2023-08-01

## 2024-02-19 NOTE — PROGRESS NOTES
Care Management Initial Consult    General Information  Assessment completed with: Other (Group Home Staff)Alcides  Type of CM/SW Visit: Initial Assessment    Primary Care Provider verified and updated as needed: No   Readmission within the last 30 days: unable to assess      Reason for Consult: other (see comments) (Next of Kin)  Advance Care Planning: Advance Care Planning Reviewed: other (see comments) (See note)     General Information Comments: See note    Communication Assessment  Patient's communication style: spoken language (English or Bilingual)             Cognitive  Cognitive/Neuro/Behavioral: .WDL except  Level of Consciousness: semicomatose  Arousal Level: unresponsive  Orientation: other (see comments) (orquidea)  Mood/Behavior:  (orquidea)  Best Language:  (orquidea)  Speech: endotracheal tube    Living Environment:   People in home: alone     Current living Arrangements: group home      Able to return to prior arrangements: other (see comments)  Living Arrangement Comments: See note    Family/Social Support:  Care provided by: self, other (see comments) (detention staff)  Provides care for: no one, unable/limited ability to care for self  Marital Status:   None          Description of Support System: Uninvolved    Support Assessment: Other (see comments) (See note)    Current Resources:   Patient receiving home care services: No     Community Resources: tagWALLET Programs, tagWALLET Worker  Equipment currently used at home:    Supplies currently used at home: None    Employment/Financial:  Employment Status: disabled     Employment/ Comments: NA  Financial Concerns: other (see comments) (unable to assess at this time)   Referral to Financial Worker: No  Finance Comments: TBD    Does the patient's insurance plan have a 3 day qualifying hospital stay waiver?  No    Lifestyle & Psychosocial Needs:  Social Determinants of Health     Food Insecurity: Not on file   Depression: Not on file   Housing Stability:  The CM attempted to meet with patient at bedside to assess the patient- patient currently off the floor for testing.  MANUEL Kelly  
 "Not on file   Tobacco Use: Not on file   Financial Resource Strain: Not on file   Alcohol Use: Not on file   Transportation Needs: Not on file   Physical Activity: Not on file   Interpersonal Safety: Not on file   Stress: Not on file   Social Connections: Not on file       Functional Status:  Prior to admission patient needed assistance:   Dependent ADLs:: Independent  Dependent IADLs:: Cleaning, Cooking, Laundry, Transportation  Assesssment of Functional Status: Not at baseline with ADL Functioning    Mental Health Status:  Mental Health Status: Other (see comment) (Unable to assess)       Chemical Dependency Status:  Chemical Dependency Status: Other (see comment) (Unable to assess)             Values/Beliefs:  Spiritual, Cultural Beliefs, Gnosticist Practices, Values that affect care: other (see comments)          Values/Beliefs Comment: Unable to assess at this time    Additional Information:  GENIE completed CMA over the phone with patient's group home staff, Alcides. GENIE unable to fully assess for all parts of CMA due to fact that patient is \"semicomatose\". Group home staff stated that patient did not share a lot of information with them and that he had no friends or family that they knew of.       GENIE updated by medical team. Patient was living at a group home ([phone]). Patient's nurse called [facility] and Alcides stated that patient did not have any friends or family. Alcides stated that patient did not currently have a  and that [facility] was working to connect patient to a new . Patient's bedside nurse called [facility] and it is closed today due to federal holiday. Patient's bedside nurse went through patient's phone looking for next of kin. Patient's bedside nurse only found \"ex-girlfriends\" listed. Patient's bedside nurse called the previous emergency contact, Dana, listed on chart and that number does not work. Dana Schaffer NUMBER IS INCORRECT (Spouse) [phone] (Mobile).   " "  Patient's bedside nurse found the contact information for Juan Alberto, a retired mental health/substance use worker - [phone]. Patient's bedside nurse stated that she called Juan Alberto and Bill said that patient was a \"loner\" and did not have any friends or family that he was aware of at this time.      SW checked Been Verified and found the name of Martha Delgadillo as potential relative. SW called the two numbers listed and both did not work.      GENIE called patient's group home and talked with Alcides. He stated that patient has no emergency contact listed but Dana and group home has tried and not been able to get in touch with them. He stated patient had no close friends. Alcides stated patient currently was in the process of being assigned a  but he had emails from Sara Powell at [facility]. GENIE called Sara at [phone] and left voicemail.      GENIE will reach out to Juan Alberto who is a retired substance use/mental health counselor. He stated that he worked with the patient and could not recall anyone the patient spoke about besides his ex-wife. Juan Alberto suggested that the SW reach out to the [facility] as patient resided in housing there for a time. SW will call [facility] to try and find out if they have next of kin information on file. GENIE Called [facility] which is [facility]'s clinic and they had no emergency contact listed. SW called [facility] and left message asking for a call back. GENIE talked with [facility] and they had no next of kin information for patient.     GENIE reached out to  and asked for their help tracking down next of kin information. GENIE specifically asked for help getting contact information for Dana Nicki.  GENIE attempted to find Dana Nicki on social media and did not have any success. GENIE obtained from  the number [phone]. SW called and left voicemail.     GENIE obtained patient's phone from bedside " "nurse. GENIE called   \"Leslie your daughter\" at [phone]. SW reached voicemail for Leslie and left voicemail asking for a call back.     \"[facility] \"  for aging and disability with [facility]. She said she would call back after she looked through her files to see if she could obtain emergency contact information.    \"Bear Bear ex girlfriend\" and \"Bear Phone Number 2\" - [phone] Her name is Shi Wilde. She stated she and the patient broke up a while ago. She stated that patient has an ex-wife by the name of Martha Delgadillo. She stated that Martha does have an active addiction. She stated that she thinks that the patient might have family in [facility] but she is not sure. She stated that Leslie might be the patient's \"street daughter\" or someone he formed a connection with in the community but not a biological child. She stated she knew of no other family or friends of patient.     GENIE called [facility]  to see if they could put me in touch with patient's next of kin. She checked and provided the following phone numbers to try [phone] and [phone].     GENIE asked private investigators to see if Patient and Martha are still . Private investigators stated they  in 2008. GENIE called Martha at Phone: [phone] and left voicemail. GENIE also called Martha at other possible numbers provided by the private investigators and none worked.     GENIE called in patient's phone the two numbers listed as \"a friend.\" The first number called ending in [phone] stated they were not friends with patient. The second number ending in [phone] was a wrong number.     GENIE will wait to see if anyone calls back who is next of kin of patient. GENIE will follow up tomorrow and see if Alcides at the group home or Bill might best serve as the person who knows the interests and values of the patient.     GENIE heard back from Susannah ([phone]) who works at [employer]" [place] and was the  for Alexi until recently. She stated that Alexi was living in an apartment that was managed by Shelley Dudley and was just evicted. She stated that she is talking to her boss to try and find out if there is any next of kin information that they have/can share. She stated that she thinks the patient has a brother. She stated she would call back soon.           OSORIO Wellington, [facility]  4A & 4E ICU   Pager: [phone]  Phone: [phone]       OSORIO Tristan

## 2025-04-28 ENCOUNTER — TRANSCRIBE ORDERS (OUTPATIENT)
Facility: HOSPITAL | Age: 80
End: 2025-04-28

## 2025-04-28 DIAGNOSIS — R13.10 DYSPHAGIA, UNSPECIFIED TYPE: Primary | ICD-10-CM

## 2025-04-28 DIAGNOSIS — K21.9 GASTROESOPHAGEAL REFLUX DISEASE, UNSPECIFIED WHETHER ESOPHAGITIS PRESENT: ICD-10-CM

## 2025-04-28 DIAGNOSIS — Z79.01 ANTICOAGULANT LONG-TERM USE: ICD-10-CM

## 2025-04-28 DIAGNOSIS — R19.5 POSITIVE COLORECTAL CANCER SCREENING USING COLOGUARD TEST: ICD-10-CM

## 2025-06-13 ENCOUNTER — ANESTHESIA (OUTPATIENT)
Facility: HOSPITAL | Age: 80
End: 2025-06-13
Payer: MEDICARE

## 2025-06-13 ENCOUNTER — ANESTHESIA EVENT (OUTPATIENT)
Facility: HOSPITAL | Age: 80
End: 2025-06-13
Payer: MEDICARE

## 2025-06-13 ENCOUNTER — HOSPITAL ENCOUNTER (OUTPATIENT)
Facility: HOSPITAL | Age: 80
Setting detail: OUTPATIENT SURGERY
Discharge: HOME | End: 2025-06-13
Attending: INTERNAL MEDICINE | Admitting: INTERNAL MEDICINE
Payer: MEDICARE

## 2025-06-13 VITALS
SYSTOLIC BLOOD PRESSURE: 104 MMHG | RESPIRATION RATE: 13 BRPM | BODY MASS INDEX: 26.07 KG/M2 | TEMPERATURE: 97.6 F | DIASTOLIC BLOOD PRESSURE: 59 MMHG | OXYGEN SATURATION: 98 % | WEIGHT: 172 LBS | HEIGHT: 68 IN | HEART RATE: 52 BPM

## 2025-06-13 PROCEDURE — 6360000002 HC RX W HCPCS: Performed by: NURSE ANESTHETIST, CERTIFIED REGISTERED

## 2025-06-13 PROCEDURE — 3700000000 HC ANESTHESIA ATTENDED CARE: Performed by: INTERNAL MEDICINE

## 2025-06-13 PROCEDURE — C1889 IMPLANT/INSERT DEVICE, NOC: HCPCS | Performed by: INTERNAL MEDICINE

## 2025-06-13 PROCEDURE — 7100000010 HC PHASE II RECOVERY - FIRST 15 MIN: Performed by: INTERNAL MEDICINE

## 2025-06-13 PROCEDURE — 3600007502: Performed by: INTERNAL MEDICINE

## 2025-06-13 PROCEDURE — 2709999900 HC NON-CHARGEABLE SUPPLY: Performed by: INTERNAL MEDICINE

## 2025-06-13 PROCEDURE — 3600007512: Performed by: INTERNAL MEDICINE

## 2025-06-13 PROCEDURE — 3700000001 HC ADD 15 MINUTES (ANESTHESIA): Performed by: INTERNAL MEDICINE

## 2025-06-13 PROCEDURE — 2720000010 HC SURG SUPPLY STERILE: Performed by: INTERNAL MEDICINE

## 2025-06-13 PROCEDURE — 88305 TISSUE EXAM BY PATHOLOGIST: CPT

## 2025-06-13 PROCEDURE — 2580000003 HC RX 258: Performed by: INTERNAL MEDICINE

## 2025-06-13 PROCEDURE — 7100000011 HC PHASE II RECOVERY - ADDTL 15 MIN: Performed by: INTERNAL MEDICINE

## 2025-06-13 DEVICE — WORKING LENGTH 235CM, WORKING CHANNEL 2.8MM
Type: IMPLANTABLE DEVICE | Site: ASCENDING COLON | Status: FUNCTIONAL
Brand: RESOLUTION 360 CLIP

## 2025-06-13 RX ORDER — SODIUM CHLORIDE 0.9 % (FLUSH) 0.9 %
5-40 SYRINGE (ML) INJECTION PRN
Status: DISCONTINUED | OUTPATIENT
Start: 2025-06-13 | End: 2025-06-13 | Stop reason: HOSPADM

## 2025-06-13 RX ORDER — SODIUM CHLORIDE 9 MG/ML
INJECTION, SOLUTION INTRAVENOUS PRN
Status: DISCONTINUED | OUTPATIENT
Start: 2025-06-13 | End: 2025-06-13 | Stop reason: HOSPADM

## 2025-06-13 RX ORDER — LIDOCAINE HYDROCHLORIDE 20 MG/ML
INJECTION, SOLUTION EPIDURAL; INFILTRATION; INTRACAUDAL; PERINEURAL
Status: DISCONTINUED | OUTPATIENT
Start: 2025-06-13 | End: 2025-06-13 | Stop reason: SDUPTHER

## 2025-06-13 RX ORDER — GLYCOPYRROLATE 0.2 MG/ML
INJECTION INTRAMUSCULAR; INTRAVENOUS
Status: DISCONTINUED | OUTPATIENT
Start: 2025-06-13 | End: 2025-06-13 | Stop reason: SDUPTHER

## 2025-06-13 RX ORDER — ROSUVASTATIN CALCIUM 10 MG/1
10 TABLET, COATED ORAL
COMMUNITY

## 2025-06-13 RX ORDER — SODIUM CHLORIDE 0.9 % (FLUSH) 0.9 %
5-40 SYRINGE (ML) INJECTION EVERY 12 HOURS SCHEDULED
Status: DISCONTINUED | OUTPATIENT
Start: 2025-06-13 | End: 2025-06-13 | Stop reason: HOSPADM

## 2025-06-13 RX ORDER — EZETIMIBE 10 MG/1
10 TABLET ORAL DAILY
COMMUNITY
Start: 2025-04-30

## 2025-06-13 RX ORDER — PANTOPRAZOLE SODIUM 40 MG/1
40 TABLET, DELAYED RELEASE ORAL
Qty: 120 TABLET | Refills: 0 | Status: SHIPPED | OUTPATIENT
Start: 2025-06-13 | End: 2025-08-12

## 2025-06-13 RX ORDER — SODIUM CHLORIDE 9 MG/ML
INJECTION, SOLUTION INTRAVENOUS CONTINUOUS
Status: DISCONTINUED | OUTPATIENT
Start: 2025-06-13 | End: 2025-06-13 | Stop reason: HOSPADM

## 2025-06-13 RX ADMIN — PROPOFOL 25 MG: 10 INJECTION, EMULSION INTRAVENOUS at 09:11

## 2025-06-13 RX ADMIN — PROPOFOL 25 MG: 10 INJECTION, EMULSION INTRAVENOUS at 09:24

## 2025-06-13 RX ADMIN — PROPOFOL 25 MG: 10 INJECTION, EMULSION INTRAVENOUS at 09:44

## 2025-06-13 RX ADMIN — GLYCOPYRROLATE 0.2 MG: 0.2 INJECTION INTRAMUSCULAR; INTRAVENOUS at 09:12

## 2025-06-13 RX ADMIN — PROPOFOL 25 MG: 10 INJECTION, EMULSION INTRAVENOUS at 09:20

## 2025-06-13 RX ADMIN — PROPOFOL 25 MG: 10 INJECTION, EMULSION INTRAVENOUS at 09:14

## 2025-06-13 RX ADMIN — PROPOFOL 25 MG: 10 INJECTION, EMULSION INTRAVENOUS at 09:41

## 2025-06-13 RX ADMIN — SODIUM CHLORIDE: 9 INJECTION, SOLUTION INTRAVENOUS at 08:49

## 2025-06-13 RX ADMIN — PROPOFOL 25 MG: 10 INJECTION, EMULSION INTRAVENOUS at 09:35

## 2025-06-13 RX ADMIN — PROPOFOL 25 MG: 10 INJECTION, EMULSION INTRAVENOUS at 09:12

## 2025-06-13 RX ADMIN — PROPOFOL 25 MG: 10 INJECTION, EMULSION INTRAVENOUS at 09:28

## 2025-06-13 RX ADMIN — PROPOFOL 25 MG: 10 INJECTION, EMULSION INTRAVENOUS at 09:33

## 2025-06-13 RX ADMIN — PROPOFOL 50 MG: 10 INJECTION, EMULSION INTRAVENOUS at 09:10

## 2025-06-13 RX ADMIN — PROPOFOL 25 MG: 10 INJECTION, EMULSION INTRAVENOUS at 09:26

## 2025-06-13 RX ADMIN — PROPOFOL 25 MG: 10 INJECTION, EMULSION INTRAVENOUS at 09:39

## 2025-06-13 RX ADMIN — PROPOFOL 25 MG: 10 INJECTION, EMULSION INTRAVENOUS at 09:31

## 2025-06-13 RX ADMIN — PROPOFOL 25 MG: 10 INJECTION, EMULSION INTRAVENOUS at 09:16

## 2025-06-13 RX ADMIN — LIDOCAINE HYDROCHLORIDE 60 MG: 20 INJECTION, SOLUTION EPIDURAL; INFILTRATION; INTRACAUDAL; PERINEURAL at 09:09

## 2025-06-13 ASSESSMENT — PAIN - FUNCTIONAL ASSESSMENT: PAIN_FUNCTIONAL_ASSESSMENT: NONE - DENIES PAIN

## 2025-06-13 NOTE — H&P
[place] [address]        History and Physical       NAME:  Keenan Vasquez   :   1945   MRN:   [id number]             History of Present Illness:  Patient is a 79 y.o. who is seen for GERD and positive Cologuard test.     PMH:  Past Medical History:   Diagnosis Date    Degenerative arthritis of knee 2009    Dermatitis 2009    Dyslipidemia 2009    Glaucoma 2009    Hypertension     Leukemia (HCC)     hairy cell Leukemia    Stroke (HCC)            PSH:  Past Surgical History:   Procedure Laterality Date    COLONOSCOPY Left 2019    COLONOSCOPY performed by Erik Wilkerson MD at [facility] ENDOSCOPY    COLONOSCOPY      MENISCECTOMY Right     ORTHOPEDIC SURGERY Left 2019    total shoulder replacement    SKIN BIOPSY  04/15    on forehead, pre-melanoma       Allergies:  Allergies   Allergen Reactions    Sulfa Antibiotics Other (See Comments)     \"fever; bleeding\"    Pravastatin Rash       Home Medications:  Prior to Admission Medications   Prescriptions Last Dose Informant Patient Reported? Taking?   Tadalafil 2.5 MG TABS Past Month  Yes Yes   Sig: TAKE 1 TABLET BY MOUTH EVERY DAY AS NEEDED   aspirin 81 MG EC tablet Past Month  Yes Yes   Sig: Take by mouth daily   clopidogrel (PLAVIX) 75 MG tablet 2025  Yes No   Sig: TAKE 1 TABLET BY MOUTH EVERY DAY   ezetimibe (ZETIA) 10 MG tablet 2025  Yes Yes   Sig: Take 1 tablet by mouth daily   famotidine (PEPCID) 10 MG tablet 2025  Yes Yes   Sig: Take by mouth   lisinopril (PRINIVIL;ZESTRIL) 10 MG tablet 2025 Morning  Yes Yes   Sig: TAKE 1 TABLET BY MOUTH EVERY DAY   rosuvastatin (CRESTOR) 10 MG tablet 2025  Yes Yes   Sig: Take 1 tablet by mouth   simvastatin (ZOCOR) 40 MG tablet   Yes No   Sig: TAKE 1 TABLET BY MOUTH EVERY DAY IN THE EVENING   timolol (TIMOPTIC) 0.5 % ophthalmic solution   Yes No   Si drop daily      Facility-Administered Medications: None

## 2025-06-13 NOTE — OP NOTE
[facility] [place] [address] [place], [place] [address]        Colonoscopy Operative Report    Keenan Vasquez  [id number]  1945      Procedure Type:   Colonoscopy with polypectomy (snare cautery)     Indications:  positive Cologuard, family history of colon cancer    Pre-operative Diagnosis: see indication above    Post-operative Diagnosis:  See findings below    :  Danial Almeida MD    Staff: Circulator: Esthela Taylor RN  Endoscopy Technician: Vandana Ricardo     Referring Provider: Graham Morejon MD      Sedation:  MAC      Procedure Details:  After informed consent was obtained with all risks and benefits of procedure explained and preoperative exam completed, the patient was taken to the endoscopy suite and placed in the left lateral decubitus position.  Upon sequential sedation as per above, a digital rectal exam was performed demonstrating internal hemorrhoids.  The Olympus pediatric videocolonoscope  was inserted in the rectum and carefully advanced to the rectosigmoid junction at which point a fixed angulated turn was encountered. The pediatric colonoscope could not traverse this region. An ultraslim pediatric colonoscope was used and was advanced to the cecum.  The cecum was identified by the ileocecal valve and appendiceal orifice.  The quality of preparation was good.  The colonoscope was slowly withdrawn with careful evaluation between folds. Retroflexion in the rectum was completed .     Findings:   Severe left-sided diverticulosis.  Single 12 mm sessile ascending colon polyp - removed with hot snare. Two Resolution 360 clips placed to close mucosal defect.  Single 10 mm sessile transverse colon polyp - removed with hot snare. Two Resolution 360 clips placed to close mucosal defect.        Specimen Removed:  1. Ascending colon polyp, 2. Transverse colon polyp    Complications: None.     EBL:  None.    Impression:     As above    Recommendations:    - Await

## 2025-06-13 NOTE — DISCHARGE INSTRUCTIONS
[facility] [address] [address]    EGD/COLON DISCHARGE INSTRUCTIONS    Keenan Vasquez  [id number]  1945    Discomfort:  Sore throat- throat lozenges or warm salt water gargle  redness at IV site- apply warm compress to area; if redness or soreness persist- contact your physician  Gaseous discomfort- walking, belching will help relieve any discomfort  You may not operate a vehicle for 12 hours  You may not engage in an occupation involving machinery or appliances for rest of today  You may not drink alcoholic beverages for at least 12 hours  Avoid making any critical decisions for at least 24 hour  DIET  You may resume your regular diet - however -  remember your colon is empty and a heavy meal will produce gas.   Avoid these foods:  vegetables, fried / greasy foods, carbonated drinks    ACTIVITY  You may resume your normal daily activities   Spend the remainder of the day resting -  avoid any strenuous activity.    CALL M.D.  ANY SIGN OF   Increasing pain, nausea, vomiting  Abdominal distension (swelling)  New increased bleeding (oral or rectal)  Fever (chills)  Pain in chest area  Bloody discharge from nose or mouth  Shortness of breath    Follow-up Instructions:   Call Dr. Danial Almeida for any questions or problems.  Telephone # [phone]    ENDOSCOPY FINDINGS:   Your endoscopy showed erosive esophagitis and a hiatal hernia. Biopsies were taken. A prescription for pantoprazole has been sent to your pharmacy. Please take this twice daily for two months. Two polyps were removed during colonoscopy. We will contact you about the pathology results. Your next colonoscopy will be due in 5 years. You may resume Plavix on Monday 6/16/25.    Signed By: Danial Almeida MD     6/13/2025  9:48 AM

## 2025-06-13 NOTE — ANESTHESIA PRE PROCEDURE
tolerance: good (>4 METS)  (+) hypertension:, dysrhythmias: PAC        Rhythm: irregular  Rate: normal  Echocardiogram reviewed               ROS comment: 2024  1. Concentric LV remodeling with normal LV systolic function. Estimated LVEF of 55-60%. Diastolic dysfunction   2. Normal RV size and systolic function   3. Normal biatrial size   4. No significant valvular abnormalites   5. Normal size aortic root   6. Normal IVC size and estimated RAP.        Neuro/Psych:   Negative Neuro/Psych ROS              GI/Hepatic/Renal:   (+) bowel prep          Endo/Other: Negative Endo/Other ROS                    Abdominal: normal exam            Vascular: negative vascular ROS.         Other Findings:         Anesthesia Plan      MAC     ASA 2       Induction: intravenous.      Anesthetic plan and risks discussed with patient.    Use of blood products discussed with patient whom consented to blood products.    Plan discussed with CRNA.    Attending anesthesiologist reviewed and agrees with Preprocedure content            Luna Ramachandran DO   6/13/2025

## 2025-06-13 NOTE — ANESTHESIA POSTPROCEDURE EVALUATION
Post-Anesthesia Evaluation and Assessment    Patient: Keenan Vasquez MRN: [id number]  SSN: [id number]    YOB: 1945  Age: 79 y.o.  Sex: male      I have evaluated the patient and they are stable and ready for discharge from the PACU.     Cardiovascular Function/Vital Signs  Visit Vitals  BP (!) 104/51   Pulse 56   Temp 98.2 °F (36.8 °C) (Skin)   Resp 19   Ht 1.727 m (5' 8\")   Wt 78 kg (172 lb)   SpO2 100%   BMI 26.15 kg/m²       Patient is status post Monitor Anesthesia Care anesthesia for Procedure(s):  ESOPHAGOGASTRODUODENOSCOPY  COLONOSCOPY DIAGNOSTIC.    Nausea/Vomiting: None    Postoperative hydration reviewed and adequate.    Pain:  Managed    Neurological Status:   At baseline    Mental Status, Level of Consciousness: Alert and  oriented to person, place, and time    Pulmonary Status:   Adequate oxygenation and airway patent    Complications related to anesthesia: None    Post-anesthesia assessment completed. No concerns    Signed By: Luna Ramachandran DO     June 13, 2025

## 2025-06-13 NOTE — PROGRESS NOTES
Initial RN admission and assessment performed and documented in Endoscopy navigator.     Patient evaluated by anesthesia in pre-procedure holding.     All procedural vital signs, airway assessment, and level of consciousness information monitored and recorded by anesthesia staff on the anesthesia record.     Report received from CRNA post procedure.  Patient transported to recovery area by RN.    Endoscopy post procedure time out was performed and specimens were verified with physician.    Endoscope was pre-cleaned at bedside immediately following procedure by Vandana.

## 2025-09-04 ENCOUNTER — HOSPITAL ENCOUNTER (OUTPATIENT)
Facility: HOSPITAL | Age: 80
Setting detail: OUTPATIENT SURGERY
Discharge: HOME | End: 2025-09-04
Attending: INTERNAL MEDICINE | Admitting: INTERNAL MEDICINE
Payer: MEDICARE

## 2025-09-04 ENCOUNTER — ANESTHESIA EVENT (OUTPATIENT)
Facility: HOSPITAL | Age: 80
End: 2025-09-04
Payer: MEDICARE

## 2025-09-04 ENCOUNTER — ANESTHESIA (OUTPATIENT)
Facility: HOSPITAL | Age: 80
End: 2025-09-04
Payer: MEDICARE

## 2025-09-04 VITALS
TEMPERATURE: 98 F | HEART RATE: 60 BPM | HEIGHT: 66 IN | WEIGHT: 179 LBS | SYSTOLIC BLOOD PRESSURE: 111 MMHG | OXYGEN SATURATION: 97 % | RESPIRATION RATE: 14 BRPM | BODY MASS INDEX: 28.77 KG/M2 | DIASTOLIC BLOOD PRESSURE: 53 MMHG

## 2025-09-04 PROCEDURE — 6360000002 HC RX W HCPCS: Performed by: NURSE ANESTHETIST, CERTIFIED REGISTERED

## 2025-09-04 PROCEDURE — 7100000010 HC PHASE II RECOVERY - FIRST 15 MIN: Performed by: INTERNAL MEDICINE

## 2025-09-04 PROCEDURE — 88305 TISSUE EXAM BY PATHOLOGIST: CPT

## 2025-09-04 PROCEDURE — 3600007502: Performed by: INTERNAL MEDICINE

## 2025-09-04 PROCEDURE — 2580000003 HC RX 258: Performed by: NURSE ANESTHETIST, CERTIFIED REGISTERED

## 2025-09-04 PROCEDURE — 7100000011 HC PHASE II RECOVERY - ADDTL 15 MIN: Performed by: INTERNAL MEDICINE

## 2025-09-04 PROCEDURE — 3700000000 HC ANESTHESIA ATTENDED CARE: Performed by: INTERNAL MEDICINE

## 2025-09-04 PROCEDURE — 2709999900 HC NON-CHARGEABLE SUPPLY: Performed by: INTERNAL MEDICINE

## 2025-09-04 RX ORDER — SODIUM CHLORIDE 0.9 % (FLUSH) 0.9 %
5-40 SYRINGE (ML) INJECTION EVERY 12 HOURS SCHEDULED
Status: DISCONTINUED | OUTPATIENT
Start: 2025-09-04 | End: 2025-09-04 | Stop reason: HOSPADM

## 2025-09-04 RX ORDER — SODIUM CHLORIDE 0.9 % (FLUSH) 0.9 %
5-40 SYRINGE (ML) INJECTION PRN
Status: DISCONTINUED | OUTPATIENT
Start: 2025-09-04 | End: 2025-09-04 | Stop reason: HOSPADM

## 2025-09-04 RX ORDER — SODIUM CHLORIDE 9 MG/ML
INJECTION, SOLUTION INTRAVENOUS
Status: DISCONTINUED | OUTPATIENT
Start: 2025-09-04 | End: 2025-09-04 | Stop reason: SDUPTHER

## 2025-09-04 RX ORDER — SODIUM CHLORIDE 9 MG/ML
INJECTION, SOLUTION INTRAVENOUS CONTINUOUS
Status: DISCONTINUED | OUTPATIENT
Start: 2025-09-04 | End: 2025-09-04 | Stop reason: HOSPADM

## 2025-09-04 RX ORDER — SODIUM CHLORIDE 9 MG/ML
INJECTION, SOLUTION INTRAVENOUS PRN
Status: DISCONTINUED | OUTPATIENT
Start: 2025-09-04 | End: 2025-09-04 | Stop reason: HOSPADM

## 2025-09-04 RX ADMIN — PROPOFOL 50 MG: 10 INJECTION, EMULSION INTRAVENOUS at 10:14

## 2025-09-04 RX ADMIN — SODIUM CHLORIDE: 9 INJECTION, SOLUTION INTRAVENOUS at 09:57

## 2025-09-04 RX ADMIN — PROPOFOL 100 MG: 10 INJECTION, EMULSION INTRAVENOUS at 10:10

## 2025-09-04 RX ADMIN — PROPOFOL 50 MG: 10 INJECTION, EMULSION INTRAVENOUS at 10:12

## 2025-09-04 RX ADMIN — LIDOCAINE HYDROCHLORIDE 100 MG: 20 INJECTION, SOLUTION EPIDURAL; INFILTRATION; INTRACAUDAL; PERINEURAL at 10:10

## 2025-09-04 ASSESSMENT — PAIN SCALES - GENERAL
PAINLEVEL_OUTOF10: 0

## (undated) DEVICE — SNARE ENDOSCP POLYP MED 2.4 MM 240 CM 27 MM 2.8 MM SHT SENS

## (undated) DEVICE — ORISE PROKNIFE 3.0 MM ELECTRODE: Brand: ORISE™ PROKNIFE

## (undated) DEVICE — TRAP SURG QUAD PARABOLA SLOT DSGN SFTY SCRN TRAPEASE

## (undated) DEVICE — SUPPLEMENT DIGESTIVE H2O SOL GI-EASE

## (undated) DEVICE — FORCEPS BX L240CM JAW DIA2.8MM L CAP W/ NDL MIC MESH TOOTH

## (undated) DEVICE — ORISE PROKNIFE 1.5 MM ELECTRODE: Brand: ORISE™ PROKNIFE

## (undated) DEVICE — FORCEPS BX L240CM JAW DIA2.4MM ORNG L CAP W/ NDL DISP RAD

## (undated) DEVICE — PAD ELECTRD NEO HYDRGEL CORDED PT RET DISP VALLEYLAB REM